# Patient Record
Sex: FEMALE | Race: WHITE | NOT HISPANIC OR LATINO | Employment: FULL TIME | URBAN - METROPOLITAN AREA
[De-identification: names, ages, dates, MRNs, and addresses within clinical notes are randomized per-mention and may not be internally consistent; named-entity substitution may affect disease eponyms.]

---

## 2017-07-08 ENCOUNTER — APPOINTMENT (OUTPATIENT)
Dept: GENERAL RADIOLOGY | Facility: HOSPITAL | Age: 62
End: 2017-07-08

## 2017-07-08 ENCOUNTER — HOSPITAL ENCOUNTER (EMERGENCY)
Facility: HOSPITAL | Age: 62
Discharge: HOME | End: 2017-07-09
Attending: EMERGENCY MEDICINE | Admitting: EMERGENCY MEDICINE

## 2017-07-08 DIAGNOSIS — S93.401A SPRAIN OF RIGHT ANKLE, UNSPECIFIED LIGAMENT, INITIAL ENCOUNTER: ICD-10-CM

## 2017-07-08 DIAGNOSIS — S82.64XA CLOSED NONDISPLACED FRACTURE OF LATERAL MALLEOLUS OF RIGHT FIBULA, INITIAL ENCOUNTER: Primary | ICD-10-CM

## 2017-07-08 PROCEDURE — 99283 EMERGENCY DEPT VISIT LOW MDM: CPT

## 2017-07-08 PROCEDURE — 73610 X-RAY EXAM OF ANKLE: CPT

## 2017-07-09 VITALS
SYSTOLIC BLOOD PRESSURE: 149 MMHG | DIASTOLIC BLOOD PRESSURE: 78 MMHG | HEIGHT: 67 IN | HEART RATE: 79 BPM | WEIGHT: 235 LBS | RESPIRATION RATE: 16 BRPM | TEMPERATURE: 97.8 F | OXYGEN SATURATION: 96 % | BODY MASS INDEX: 36.88 KG/M2

## 2017-07-09 RX ORDER — HYDROCODONE BITARTRATE AND ACETAMINOPHEN 7.5; 325 MG/1; MG/1
1 TABLET ORAL ONCE
Status: COMPLETED | OUTPATIENT
Start: 2017-07-09 | End: 2017-07-09

## 2017-07-09 RX ORDER — HYDROCODONE BITARTRATE AND ACETAMINOPHEN 5; 325 MG/1; MG/1
1 TABLET ORAL EVERY 6 HOURS PRN
Qty: 12 TABLET | Refills: 0 | Status: SHIPPED | OUTPATIENT
Start: 2017-07-09 | End: 2017-12-22

## 2017-07-09 RX ADMIN — HYDROCODONE BITARTRATE AND ACETAMINOPHEN 1 TABLET: 7.5; 325 TABLET ORAL at 02:16

## 2017-07-09 NOTE — ED PROVIDER NOTES
Pt presents to the ED c/o right ankle pain that began tonight after falling down a step. She has no other complaints at this time. On exam, swelling and tenderness to the RLE, skin is intact. Knee is non tender. XR shows a fibula fx. I agree with the plan for ortho boot, d/c, and ortho f/u.     Attestation:  I supervised care provided by the midlevel provider.    We have discussed this patient's history, physical exam, and treatment plan.    I have reviewed the note and personally saw and examined the patient and agree with the plan of care.  I agree with the midlevel provider's findings and plan. I reviewed the midlevel providers note.     Documentation assistance provided by tanisha Carrasco for Dr Acuna Information recorded by the tanisha was done at my direction and has been verified and validated by me.     Didi Carrasco  07/09/17 0143       Robbi Acuna MD  07/09/17 0146

## 2017-07-09 NOTE — DISCHARGE INSTRUCTIONS
PLEASE READ AND REVIEW ALL DISCHARGE INSTRUCTIONS.     Please follow up with your primary care physician for any further evaluation/treatment and further management of your blood pressure.     Recheck in emergency department for any worsening and/or concerning symptoms.     Take all prescribed medicine as written and continue chronic medication.    Wear boot for support.  Ice, Rest, and Elevate as often as possible.  Take pain medicine as needed.      DO NOT DRIVE WHILE ON PAIN MEDICINE.

## 2017-07-09 NOTE — ED NOTES
Pt was walking down some stairs and missed the last step and fell and hurt her right ankle/felipe     Joshua Doan RN  07/08/17 5494

## 2017-07-09 NOTE — ED PROVIDER NOTES
"EMERGENCY DEPARTMENT ENCOUNTER    CHIEF COMPLAINT  Chief Complaint: R ankle pain  History given by: Pt  History limited by: Nothing  Room Number: 07/07  PMD: Evelyn Gordon MD      HPI:  Pt is a 62 y.o. female who presents with R ankle pain onset earlier today. Pt states that she fell down two steps because she missed the last step. She denies any other injury.  Denies head trauma, neck pain, or back pain.      Pt has no pertinent medical hx.    Duration: One day  Timing: gradual  Location: generalized  Radiation: none  Quality: \"pain\"  Intensity/Severity: moderate  Progression: unchanged  Associated Symptoms: none  Aggravating Factors: none  Alleviating Factors: none  Previous Episodes: none  Treatment before arrival: Pt received no treatment PTA..    MEDICAL RECORD REVIEW    Pt has no pertinent medical hx.    PAST MEDICAL HISTORY  Active Ambulatory Problems     Diagnosis Date Noted   • No Active Ambulatory Problems     Resolved Ambulatory Problems     Diagnosis Date Noted   • No Resolved Ambulatory Problems     Past Medical History:   Diagnosis Date   • Hyperlipidemia    • Hypertension        PAST SURGICAL HISTORY  No past surgical history on file.    FAMILY HISTORY  No family history on file.    SOCIAL HISTORY  Social History     Social History   • Marital status:      Spouse name: N/A   • Number of children: N/A   • Years of education: N/A     Occupational History   • Not on file.     Social History Main Topics   • Smoking status: Never Smoker   • Smokeless tobacco: Not on file   • Alcohol use No   • Drug use: No   • Sexual activity: Defer     Other Topics Concern   • Not on file     Social History Narrative       ALLERGIES  Benicar [olmesartan]; Fiorinal [butalbital-aspirin-caffeine]; and Propoxyphene-acetaminophen    REVIEW OF SYSTEMS  Review of Systems   Constitutional: Negative for fever.   HENT: Negative for sore throat.    Eyes: Negative.    Respiratory: Negative for cough and shortness of breath.  "   Cardiovascular: Negative for chest pain.   Gastrointestinal: Negative for abdominal pain, diarrhea and vomiting.   Genitourinary: Negative for dysuria.   Musculoskeletal: Negative for neck pain.        Pt complains of R ankle pain.   Skin: Negative for rash.   Allergic/Immunologic: Negative.    Neurological: Negative for weakness, numbness and headaches.   Hematological: Negative.    Psychiatric/Behavioral: Negative.    All other systems reviewed and are negative.      PHYSICAL EXAM  ED Triage Vitals   Temp Heart Rate Resp BP SpO2   07/08/17 2318 07/08/17 2318 07/08/17 2318 -- 07/08/17 2318   97.8 °F (36.6 °C) 82 18  98 %      Temp src Heart Rate Source Patient Position BP Location FiO2 (%)   07/08/17 2318 07/08/17 2318 -- -- --   Tympanic Monitor          Physical Exam   Constitutional: She is oriented to person, place, and time and well-developed, well-nourished, and in no distress. No distress.   HENT:   Head: Normocephalic and atraumatic.   Eyes: EOM are normal.   Neck: Normal range of motion.   Pulmonary/Chest: Effort normal. No respiratory distress.   Musculoskeletal:   Pt has swelling at her medial and lateral malleolus. She has tenderness at her distal tibia and fibula. There is no proximal tibia tenderness. He has a normal foot exam.   Neurological: She is alert and oriented to person, place, and time.   Skin: Skin is warm and dry. No pallor.   Psychiatric: Mood, memory, affect and judgment normal.   Nursing note and vitals reviewed.      LAB RESULTS  No results found for this or any previous visit (from the past 24 hour(s)).    I ordered the above labs and reviewed the results    RADIOLOGY  XR Ankle 3+ View Right   Final Result   1. Oblique fracture of the distal fibula.       This report was finalized on 7/8/2017 11:50 PM by Giancarlo Gandhi MD.              I ordered the above noted radiological studies and reviewed the images on the PACS system.      PROCEDURES      COURSE & MEDICAL DECISION  "MAKING  Pertinent Imaging studies that were ordered and reviewed are noted above.  Results were reviewed/discussed with the patient and they were also made aware of online assess.  Pt also made aware that some labs, such as cultures, will not be resulted during ER visit and follow up with PMD is necessary.       PROGRESS AND CONSULTS    Progress Notes:    0150 Reviewed pt's history and workup with Dr. Acuna.  After a bedside evaluation; Dr. Acuna agrees with the plan of care    0209 The patient's history, physical exam, and image findings were discussed with the physician, who also performed a face to face history and physical exam.  I discussed all results and noted any abnormalities with patient.  Discussed absoute need to recheck abnormalities with their family physician.  I answered any of the patient's questions.  Discussed plan for discharge, as there is no emergent indication for admission.  Pt is agreeable and understands need for follow up and repeat testing.  Pt is aware that discharge does not mean that nothing is wrong but it indicates no emergency is present and they must continue care with their family physician.  Pt is discharged with instructions to follow up with primary care doctor to have their blood pressure rechecked.       MEDICATIONS GIVEN IN ER  Medications   HYDROcodone-acetaminophen (NORCO) 7.5-325 MG per tablet 1 tablet (not administered)       Pulse 82  Temp 97.8 °F (36.6 °C) (Tympanic)   Resp 18  Ht 67\" (170.2 cm)  Wt 235 lb (107 kg)  SpO2 98%  BMI 36.81 kg/m2      DIAGNOSIS  Final diagnoses:   Closed nondisplaced fracture of lateral malleolus of right fibula, initial encounter   Sprain of right ankle, unspecified ligament, initial encounter       FOLLOW UP   Evelyn Gordon MD  [address] [facility] [address] [phone]          Akin Peterson MD  [address] [facility] [address]  [facility] [id number] [phone]            RX     Medication List      New Prescriptions       "    HYDROcodone-acetaminophen 5-325 MG per tablet   Commonly known as:  NORCO   Take 1 tablet by mouth Every 6 (Six) Hours As Needed for Moderate Pain   (4-6).          Ayan report [id number] reviewed.  Risks, benefits, alternatives discussed with patient.  Pt consents to treatment and agrees to follow up with PMD tomorrow for further care and any other prescriptions.     I personally scribed for Eloise Chaidez PA-C on 7/8/2017 at 2:11 AM.  Electronically signed by Dillon Weaver on 7/8/2017 at time 2:11 AM       Dillon Weaver  07/09/17 0211       Eloise Chaidez PA-C  07/09/17 0218

## 2017-12-05 ENCOUNTER — TRANSCRIBE ORDERS (OUTPATIENT)
Dept: ADMINISTRATIVE | Facility: HOSPITAL | Age: 62
End: 2017-12-05

## 2017-12-05 ENCOUNTER — LAB (OUTPATIENT)
Dept: LAB | Facility: HOSPITAL | Age: 62
End: 2017-12-05
Attending: OPHTHALMOLOGY

## 2017-12-05 DIAGNOSIS — H02.531 UPPER EYELID RETRACTION OF RIGHT EYE: Primary | ICD-10-CM

## 2017-12-05 DIAGNOSIS — H02.531 UPPER EYELID RETRACTION OF RIGHT EYE: ICD-10-CM

## 2017-12-05 LAB
T-UPTAKE NFR SERPL: 1.17 TBI (ref 0.8–1.3)
T4 SERPL-MCNC: 8.56 MCG/DL (ref 4.5–11.7)
TSH SERPL DL<=0.05 MIU/L-ACNC: 2.24 MIU/ML (ref 0.27–4.2)

## 2017-12-05 PROCEDURE — 36415 COLL VENOUS BLD VENIPUNCTURE: CPT

## 2017-12-05 PROCEDURE — 84479 ASSAY OF THYROID (T3 OR T4): CPT

## 2017-12-05 PROCEDURE — 84443 ASSAY THYROID STIM HORMONE: CPT

## 2017-12-05 PROCEDURE — 84436 ASSAY OF TOTAL THYROXINE: CPT

## 2017-12-22 ENCOUNTER — OFFICE VISIT (OUTPATIENT)
Dept: INTERNAL MEDICINE | Facility: CLINIC | Age: 62
End: 2017-12-22

## 2017-12-22 VITALS
WEIGHT: 240 LBS | OXYGEN SATURATION: 99 % | BODY MASS INDEX: 37.67 KG/M2 | TEMPERATURE: 98.2 F | SYSTOLIC BLOOD PRESSURE: 132 MMHG | DIASTOLIC BLOOD PRESSURE: 86 MMHG | HEIGHT: 67 IN | HEART RATE: 95 BPM

## 2017-12-22 DIAGNOSIS — I10 BENIGN ESSENTIAL HYPERTENSION: ICD-10-CM

## 2017-12-22 DIAGNOSIS — E78.2 MIXED HYPERLIPIDEMIA: ICD-10-CM

## 2017-12-22 DIAGNOSIS — H02.401 PTOSIS OF RIGHT EYELID: Primary | ICD-10-CM

## 2017-12-22 DIAGNOSIS — R73.01 IMPAIRED FASTING GLUCOSE: ICD-10-CM

## 2017-12-22 PROBLEM — E66.9 ADIPOSITY: Status: ACTIVE | Noted: 2017-12-22

## 2017-12-22 PROBLEM — E78.5 HYPERLIPIDEMIA: Status: ACTIVE | Noted: 2017-12-22

## 2017-12-22 LAB
ALBUMIN SERPL-MCNC: 4 G/DL (ref 3.5–5.2)
ALBUMIN/GLOB SERPL: 1.1 G/DL
ALP SERPL-CCNC: 156 U/L (ref 39–117)
ALT SERPL-CCNC: 33 U/L (ref 1–33)
APPEARANCE UR: CLEAR
AST SERPL-CCNC: 35 U/L (ref 1–32)
BASOPHILS # BLD AUTO: 0.01 10*3/MM3 (ref 0–0.2)
BASOPHILS NFR BLD AUTO: 0.2 % (ref 0–1.5)
BILIRUB SERPL-MCNC: 0.3 MG/DL (ref 0.1–1.2)
BILIRUB UR QL STRIP: NEGATIVE
BUN SERPL-MCNC: 19 MG/DL (ref 8–23)
BUN/CREAT SERPL: 21.3 (ref 7–25)
CALCIUM SERPL-MCNC: 9.1 MG/DL (ref 8.6–10.5)
CHLORIDE SERPL-SCNC: 104 MMOL/L (ref 98–107)
CHOLEST SERPL-MCNC: 207 MG/DL (ref 0–200)
CHOLEST/HDLC SERPL: 5.31 {RATIO}
CO2 SERPL-SCNC: 28.4 MMOL/L (ref 22–29)
COLOR UR: YELLOW
CREAT SERPL-MCNC: 0.89 MG/DL (ref 0.57–1)
EOSINOPHIL # BLD AUTO: 0.15 10*3/MM3 (ref 0–0.7)
EOSINOPHIL NFR BLD AUTO: 3 % (ref 0.3–6.2)
ERYTHROCYTE [DISTWIDTH] IN BLOOD BY AUTOMATED COUNT: 14.5 % (ref 11.7–13)
GLOBULIN SER CALC-MCNC: 3.7 GM/DL
GLUCOSE SERPL-MCNC: 96 MG/DL (ref 65–99)
GLUCOSE UR QL: NEGATIVE
HBA1C MFR BLD: 5.8 % (ref 4.8–5.6)
HCT VFR BLD AUTO: 47 % (ref 35.6–45.5)
HDLC SERPL-MCNC: 39 MG/DL (ref 40–60)
HGB BLD-MCNC: 14.6 G/DL (ref 11.9–15.5)
HGB UR QL STRIP: NEGATIVE
IMM GRANULOCYTES # BLD: 0 10*3/MM3 (ref 0–0.03)
IMM GRANULOCYTES NFR BLD: 0 % (ref 0–0.5)
KETONES UR QL STRIP: NEGATIVE
LDLC SERPL CALC-MCNC: 146 MG/DL (ref 0–100)
LEUKOCYTE ESTERASE UR QL STRIP: NEGATIVE
LYMPHOCYTES # BLD AUTO: 1.41 10*3/MM3 (ref 0.9–4.8)
LYMPHOCYTES NFR BLD AUTO: 28.1 % (ref 19.6–45.3)
MCH RBC QN AUTO: 28.6 PG (ref 26.9–32)
MCHC RBC AUTO-ENTMCNC: 31.1 G/DL (ref 32.4–36.3)
MCV RBC AUTO: 92 FL (ref 80.5–98.2)
MONOCYTES # BLD AUTO: 0.94 10*3/MM3 (ref 0.2–1.2)
MONOCYTES NFR BLD AUTO: 18.7 % (ref 5–12)
NEUTROPHILS # BLD AUTO: 2.51 10*3/MM3 (ref 1.9–8.1)
NEUTROPHILS NFR BLD AUTO: 50 % (ref 42.7–76)
NITRITE UR QL STRIP: NEGATIVE
PH UR STRIP: 6 [PH] (ref 5–8)
PLATELET # BLD AUTO: 151 10*3/MM3 (ref 140–500)
POTASSIUM SERPL-SCNC: 4.7 MMOL/L (ref 3.5–5.2)
PROT SERPL-MCNC: 7.7 G/DL (ref 6–8.5)
PROT UR QL STRIP: NEGATIVE
RBC # BLD AUTO: 5.11 10*6/MM3 (ref 3.9–5.2)
SODIUM SERPL-SCNC: 144 MMOL/L (ref 136–145)
SP GR UR: 1.01 (ref 1–1.03)
TRIGL SERPL-MCNC: 112 MG/DL (ref 0–150)
UROBILINOGEN UR STRIP-MCNC: NORMAL MG/DL
VLDLC SERPL CALC-MCNC: 22.4 MG/DL (ref 5–40)
WBC # BLD AUTO: 5.02 10*3/MM3 (ref 4.5–10.7)

## 2017-12-22 PROCEDURE — 99214 OFFICE O/P EST MOD 30 MIN: CPT | Performed by: FAMILY MEDICINE

## 2017-12-22 RX ORDER — LISINOPRIL 40 MG/1
40 TABLET ORAL DAILY
Qty: 30 TABLET | Refills: 5 | Status: SHIPPED | OUTPATIENT
Start: 2017-12-22 | End: 2018-07-02 | Stop reason: SDUPTHER

## 2017-12-22 RX ORDER — FOLIC ACID 1 MG/1
800 TABLET ORAL
COMMUNITY
End: 2018-07-02 | Stop reason: SDUPTHER

## 2017-12-22 RX ORDER — IBUPROFEN 200 MG
200 TABLET ORAL EVERY 6 HOURS PRN
COMMUNITY

## 2017-12-22 RX ORDER — UBIDECARENONE 75 MG
1000 CAPSULE ORAL
COMMUNITY

## 2017-12-22 NOTE — PROGRESS NOTES
Subjective   Lacey Ramirez is a 62 y.o. female.     Chief Complaint   Patient presents with   • Hypertension   • Hyperlipidemia         History of Present Illness   Patient seen here with the history of hypertension and has had drooping right eyelid since rubbing the eyelid in August.  She has been evaluated by ophthalmology Dr. Elizabeth.  She's had negative thyroid tests.  She has not had an MRI of the brain but has had no other symptoms.  Discussed seeing neurology for another opinion.  This appears to be a local drooping of the eyelid but not completely drooping.  She's had no diplopia.    Otherwise treatment of hypertension history of hyperlipidemia is reviewed.      The following portions of the patient's history were reviewed and updated as appropriate: allergies, current medications, past social history and problem list.    Review of Systems   Constitutional: Negative.    HENT: Negative.    Eyes: Negative.    Respiratory: Negative.    Cardiovascular: Negative.    Gastrointestinal: Negative.    Endocrine: Negative.    Genitourinary: Negative.    Musculoskeletal: Negative.    Skin: Negative.    Allergic/Immunologic: Negative.    Neurological: Negative.    Hematological: Negative.    Psychiatric/Behavioral: Negative.        Objective   Vitals:    12/22/17 0930   BP: 132/86   Pulse: 95   Temp: 98.2 °F (36.8 °C)   SpO2: 99%     Physical Exam   Constitutional: She is oriented to person, place, and time. She appears well-developed and well-nourished.   HENT:   Head: Normocephalic and atraumatic.   Right Ear: Tympanic membrane and external ear normal.   Left Ear: Tympanic membrane and external ear normal.   Nose: Nose normal.   Mouth/Throat: Oropharynx is clear and moist.   Eyes: Conjunctivae and EOM are normal. Pupils are equal, round, and reactive to light.       Neck: Normal range of motion. Neck supple. No JVD present. No thyromegaly present.   Cardiovascular: Normal rate, regular rhythm, normal heart sounds and  intact distal pulses.    Pulmonary/Chest: Effort normal and breath sounds normal.   Abdominal: Soft. Bowel sounds are normal.   Musculoskeletal: Normal range of motion.   Lymphadenopathy:     She has no cervical adenopathy.   Neurological: She is alert and oriented to person, place, and time. No cranial nerve deficit. Coordination normal.   Skin: Skin is warm and dry. No rash noted.   Psychiatric: She has a normal mood and affect. Her behavior is normal. Judgment and thought content normal.   Vitals reviewed.      Assessment/Plan   Problem List Items Addressed This Visit        Cardiovascular and Mediastinum    Benign essential hypertension    Relevant Medications    lisinopril (PRINIVIL,ZESTRIL) 40 MG tablet    Other Relevant Orders    Comprehensive Metabolic Panel    CBC & Differential    Lipid Panel With / Chol / HDL Ratio    Hemoglobin A1c    Urinalysis With / Microscopic If Indicated - Urine, Clean Catch    Hyperlipidemia    Relevant Orders    Comprehensive Metabolic Panel    CBC & Differential    Lipid Panel With / Chol / HDL Ratio    Hemoglobin A1c    Urinalysis With / Microscopic If Indicated - Urine, Clean Catch       Endocrine    Impaired fasting glucose    Relevant Orders    Comprehensive Metabolic Panel    CBC & Differential    Lipid Panel With / Chol / HDL Ratio    Hemoglobin A1c    Urinalysis With / Microscopic If Indicated - Urine, Clean Catch      Other Visit Diagnoses     Ptosis of right eyelid    -  Primary    Relevant Orders    Ambulatory Referral to Neurology    Comprehensive Metabolic Panel    CBC & Differential    Lipid Panel With / Chol / HDL Ratio    Hemoglobin A1c    Urinalysis With / Microscopic If Indicated - Urine, Clean Catch      Plan: Screening labs referral to neurology.  Meds refilled recheck in 6 months sooner if needed.

## 2018-05-14 DIAGNOSIS — E78.2 MIXED HYPERLIPIDEMIA: Primary | ICD-10-CM

## 2018-05-14 DIAGNOSIS — I10 ESSENTIAL HYPERTENSION: ICD-10-CM

## 2018-05-14 DIAGNOSIS — R73.02 GLUCOSE INTOLERANCE (IMPAIRED GLUCOSE TOLERANCE): ICD-10-CM

## 2018-07-01 ENCOUNTER — RESULTS ENCOUNTER (OUTPATIENT)
Dept: INTERNAL MEDICINE | Facility: CLINIC | Age: 63
End: 2018-07-01

## 2018-07-01 DIAGNOSIS — R73.02 GLUCOSE INTOLERANCE (IMPAIRED GLUCOSE TOLERANCE): ICD-10-CM

## 2018-07-01 DIAGNOSIS — I10 ESSENTIAL HYPERTENSION: ICD-10-CM

## 2018-07-01 DIAGNOSIS — E78.2 MIXED HYPERLIPIDEMIA: ICD-10-CM

## 2018-07-02 ENCOUNTER — OFFICE VISIT (OUTPATIENT)
Dept: INTERNAL MEDICINE | Facility: CLINIC | Age: 63
End: 2018-07-02

## 2018-07-02 VITALS
DIASTOLIC BLOOD PRESSURE: 88 MMHG | SYSTOLIC BLOOD PRESSURE: 142 MMHG | HEART RATE: 89 BPM | WEIGHT: 248.8 LBS | BODY MASS INDEX: 39.05 KG/M2 | RESPIRATION RATE: 16 BRPM | TEMPERATURE: 99.1 F | HEIGHT: 67 IN | OXYGEN SATURATION: 97 %

## 2018-07-02 DIAGNOSIS — I10 BENIGN ESSENTIAL HYPERTENSION: ICD-10-CM

## 2018-07-02 DIAGNOSIS — R73.01 IMPAIRED FASTING GLUCOSE: ICD-10-CM

## 2018-07-02 DIAGNOSIS — E78.2 MIXED HYPERLIPIDEMIA: Primary | ICD-10-CM

## 2018-07-02 LAB
ALBUMIN SERPL-MCNC: 4 G/DL (ref 3.5–5.2)
ALBUMIN/GLOB SERPL: 1.2 G/DL
ALP SERPL-CCNC: 133 U/L (ref 39–117)
ALT SERPL-CCNC: 23 U/L (ref 1–33)
AST SERPL-CCNC: 19 U/L (ref 1–32)
BASOPHILS # BLD AUTO: 0.01 10*3/MM3 (ref 0–0.2)
BASOPHILS NFR BLD AUTO: 0.1 % (ref 0–1.5)
BILIRUB SERPL-MCNC: 0.4 MG/DL (ref 0.1–1.2)
BUN SERPL-MCNC: 20 MG/DL (ref 8–23)
BUN/CREAT SERPL: 22 (ref 7–25)
CALCIUM SERPL-MCNC: 9.5 MG/DL (ref 8.6–10.5)
CHLORIDE SERPL-SCNC: 105 MMOL/L (ref 98–107)
CHOLEST SERPL-MCNC: 209 MG/DL (ref 0–200)
CHOLEST/HDLC SERPL: 4.75 {RATIO}
CO2 SERPL-SCNC: 24.2 MMOL/L (ref 22–29)
CREAT SERPL-MCNC: 0.91 MG/DL (ref 0.57–1)
EOSINOPHIL # BLD AUTO: 0.2 10*3/MM3 (ref 0–0.7)
EOSINOPHIL NFR BLD AUTO: 2.9 % (ref 0.3–6.2)
ERYTHROCYTE [DISTWIDTH] IN BLOOD BY AUTOMATED COUNT: 13.9 % (ref 11.7–13)
GLOBULIN SER CALC-MCNC: 3.3 GM/DL
GLUCOSE SERPL-MCNC: 116 MG/DL (ref 65–99)
HBA1C MFR BLD: 5.9 % (ref 4.8–5.6)
HCT VFR BLD AUTO: 45.8 % (ref 35.6–45.5)
HDLC SERPL-MCNC: 44 MG/DL (ref 40–60)
HGB BLD-MCNC: 14.4 G/DL (ref 11.9–15.5)
IMM GRANULOCYTES # BLD: 0.02 10*3/MM3 (ref 0–0.03)
IMM GRANULOCYTES NFR BLD: 0.3 % (ref 0–0.5)
LDLC SERPL CALC-MCNC: 140 MG/DL (ref 0–100)
LYMPHOCYTES # BLD AUTO: 1.68 10*3/MM3 (ref 0.9–4.8)
LYMPHOCYTES NFR BLD AUTO: 24.1 % (ref 19.6–45.3)
MCH RBC QN AUTO: 28.9 PG (ref 26.9–32)
MCHC RBC AUTO-ENTMCNC: 31.4 G/DL (ref 32.4–36.3)
MCV RBC AUTO: 92 FL (ref 80.5–98.2)
MONOCYTES # BLD AUTO: 0.55 10*3/MM3 (ref 0.2–1.2)
MONOCYTES NFR BLD AUTO: 7.9 % (ref 5–12)
NEUTROPHILS # BLD AUTO: 4.51 10*3/MM3 (ref 1.9–8.1)
NEUTROPHILS NFR BLD AUTO: 64.7 % (ref 42.7–76)
PLATELET # BLD AUTO: 181 10*3/MM3 (ref 140–500)
POTASSIUM SERPL-SCNC: 4.3 MMOL/L (ref 3.5–5.2)
PROT SERPL-MCNC: 7.3 G/DL (ref 6–8.5)
RBC # BLD AUTO: 4.98 10*6/MM3 (ref 3.9–5.2)
SODIUM SERPL-SCNC: 141 MMOL/L (ref 136–145)
TRIGL SERPL-MCNC: 125 MG/DL (ref 0–150)
TSH SERPL DL<=0.005 MIU/L-ACNC: 1.47 MIU/ML (ref 0.27–4.2)
VLDLC SERPL CALC-MCNC: 25 MG/DL (ref 5–40)
WBC # BLD AUTO: 6.97 10*3/MM3 (ref 4.5–10.7)

## 2018-07-02 PROCEDURE — 99213 OFFICE O/P EST LOW 20 MIN: CPT | Performed by: FAMILY MEDICINE

## 2018-07-02 RX ORDER — LISINOPRIL 40 MG/1
40 TABLET ORAL DAILY
Qty: 30 TABLET | Refills: 5 | Status: SHIPPED | OUTPATIENT
Start: 2018-07-02 | End: 2019-02-10 | Stop reason: SDUPTHER

## 2018-07-02 RX ORDER — MULTIVIT WITH MINERALS/LUTEIN
1000 TABLET ORAL DAILY
Qty: 30 CAPSULE | Refills: 6 | Status: SHIPPED | OUTPATIENT
Start: 2018-07-02

## 2018-07-02 RX ORDER — UREA 10 %
800 LOTION (ML) TOPICAL DAILY
Qty: 30 TABLET | Refills: 6 | Status: SHIPPED | OUTPATIENT
Start: 2018-07-02

## 2018-07-02 NOTE — PROGRESS NOTES
Subjective   Lacey Ramirez is a 63 y.o. female.     Chief Complaint   Patient presents with   • Hypertension   • Hyperlipidemia   Elevated glucose      History of Present Illness   Patient symptomatically is doing well.  She is still working.  She is tolerating her medications without difficulty.    History of back near for physical.  Labs be done today and preceding next visit.  Reviewed treatment of hypertension hyperlipidemia also consideration elevated glucose.  She is encouraged to get another gynecologist get mammograms.      The following portions of the patient's history were reviewed and updated as appropriate: allergies, current medications, past social history and problem list.    Review of Systems   Constitutional: Negative.    HENT: Negative.    Eyes: Negative.    Respiratory: Negative.    Cardiovascular: Negative.    Gastrointestinal: Negative.    All other systems reviewed and are negative.      Objective   Vitals:    07/02/18 0827   BP: 142/88   Pulse: 89   Resp: 16   Temp: 99.1 °F (37.3 °C)   SpO2: 97%     Physical Exam   Constitutional: She is oriented to person, place, and time. She appears well-developed.   HENT:   Head: Normocephalic.   Right Ear: External ear normal.   Left Ear: External ear normal.   Mouth/Throat: Oropharynx is clear and moist.   Eyes: Pupils are equal, round, and reactive to light.   Neck: Normal range of motion. Neck supple.   Cardiovascular: Normal rate, regular rhythm and normal heart sounds.    Pulmonary/Chest: Effort normal and breath sounds normal.   Abdominal: Soft. Bowel sounds are normal.   Musculoskeletal: Normal range of motion.   Neurological: She is alert and oriented to person, place, and time.   Skin: Skin is warm and dry.   Psychiatric: She has a normal mood and affect.   Vitals reviewed.      Assessment/Plan   Problem List Items Addressed This Visit        Cardiovascular and Mediastinum    Benign essential hypertension    Relevant Orders    CBC &  Differential    Comprehensive Metabolic Panel    Hemoglobin A1c    Lipid Panel With / Chol / HDL Ratio    TSH    T4, Free    T3, Free    Urinalysis With Culture If Indicated - Urine, Clean Catch    CBC & Differential    Comprehensive Metabolic Panel    Hemoglobin A1c    Lipid Panel With / Chol / HDL Ratio    TSH    Urinalysis With Microscopic If Indicated (No Culture) - Urine, Clean Catch    Hyperlipidemia - Primary    Relevant Orders    CBC & Differential    Comprehensive Metabolic Panel    Hemoglobin A1c    Lipid Panel With / Chol / HDL Ratio    TSH    T4, Free    T3, Free    Urinalysis With Culture If Indicated - Urine, Clean Catch    CBC & Differential    Comprehensive Metabolic Panel    Hemoglobin A1c    Lipid Panel With / Chol / HDL Ratio    TSH    Urinalysis With Microscopic If Indicated (No Culture) - Urine, Clean Catch       Endocrine    Impaired fasting glucose    Relevant Orders    CBC & Differential    Comprehensive Metabolic Panel    Hemoglobin A1c    Lipid Panel With / Chol / HDL Ratio    TSH    T4, Free    T3, Free    Urinalysis With Culture If Indicated - Urine, Clean Catch    CBC & Differential    Comprehensive Metabolic Panel    Hemoglobin A1c    Lipid Panel With / Chol / HDL Ratio    TSH    Urinalysis With Microscopic If Indicated (No Culture) - Urine, Clean Catch      Plan: Screening labs today and in a year.  Encouraged to get gynecologist and mammograms.  Medications continued.  Labs today

## 2019-02-11 RX ORDER — LISINOPRIL 40 MG/1
TABLET ORAL
Qty: 30 TABLET | Refills: 4 | Status: SHIPPED | OUTPATIENT
Start: 2019-02-11 | End: 2019-07-28 | Stop reason: SDUPTHER

## 2019-06-01 ENCOUNTER — RESULTS ENCOUNTER (OUTPATIENT)
Dept: INTERNAL MEDICINE | Facility: CLINIC | Age: 64
End: 2019-06-01

## 2019-06-01 DIAGNOSIS — I10 BENIGN ESSENTIAL HYPERTENSION: ICD-10-CM

## 2019-06-01 DIAGNOSIS — R73.01 IMPAIRED FASTING GLUCOSE: ICD-10-CM

## 2019-06-01 DIAGNOSIS — E78.2 MIXED HYPERLIPIDEMIA: ICD-10-CM

## 2019-07-29 RX ORDER — LISINOPRIL 40 MG/1
TABLET ORAL
Qty: 30 TABLET | Refills: 2 | Status: SHIPPED | OUTPATIENT
Start: 2019-07-29

## 2019-09-05 ENCOUNTER — TELEPHONE (OUTPATIENT)
Dept: INTERNAL MEDICINE | Facility: CLINIC | Age: 64
End: 2019-09-05

## 2019-09-05 NOTE — TELEPHONE ENCOUNTER
Pt. Didn't mind waiting for Dr. Tavera and understands that he is ill but she wants us to continue to refill meds when it has been over a year and I explained that she would need to be seen by one of our NP's to continue to get refills after this 30 day refill. She did not agree and said that she was going to find another physician.

## 2025-01-01 DIAGNOSIS — K21.9 GASTROESOPHAGEAL REFLUX DISEASE, UNSPECIFIED WHETHER ESOPHAGITIS PRESENT: Chronic | ICD-10-CM

## 2025-01-01 RX ORDER — PANTOPRAZOLE SODIUM 40 MG/1
40 TABLET, DELAYED RELEASE ORAL
Qty: 30 TABLET | Refills: 0 | Status: SHIPPED | OUTPATIENT
Start: 2025-01-01

## 2025-02-23 ENCOUNTER — HOSPITAL ENCOUNTER (INPATIENT)
Facility: HOSPITAL | Age: 70
LOS: 1 days | Discharge: HOME | End: 2025-02-27
Attending: EMERGENCY MEDICINE | Admitting: INTERNAL MEDICINE
Payer: MEDICARE

## 2025-02-23 ENCOUNTER — APPOINTMENT (OUTPATIENT)
Dept: GENERAL RADIOLOGY | Facility: HOSPITAL | Age: 70
End: 2025-02-23
Payer: MEDICARE

## 2025-02-23 DIAGNOSIS — M79.89 LEG SWELLING: ICD-10-CM

## 2025-02-23 DIAGNOSIS — K74.60 CIRRHOSIS OF LIVER WITH ASCITES, UNSPECIFIED HEPATIC CIRRHOSIS TYPE: ICD-10-CM

## 2025-02-23 DIAGNOSIS — R18.8 OTHER ASCITES: ICD-10-CM

## 2025-02-23 DIAGNOSIS — R18.8 CIRRHOSIS OF LIVER WITH ASCITES, UNSPECIFIED HEPATIC CIRRHOSIS TYPE: ICD-10-CM

## 2025-02-23 DIAGNOSIS — R07.9 CHEST PAIN, UNSPECIFIED TYPE: Primary | ICD-10-CM

## 2025-02-23 LAB
ALBUMIN SERPL-MCNC: 2.2 G/DL (ref 3.5–5.2)
ALBUMIN/GLOB SERPL: 0.5 G/DL
ALP SERPL-CCNC: 269 U/L (ref 39–117)
ALT SERPL W P-5'-P-CCNC: 47 U/L (ref 1–33)
ANION GAP SERPL CALCULATED.3IONS-SCNC: 6 MMOL/L (ref 5–15)
APTT PPP: 35.9 SECONDS (ref 22.7–35.4)
AST SERPL-CCNC: 83 U/L (ref 1–32)
BASOPHILS # BLD AUTO: 0.05 10*3/MM3 (ref 0–0.2)
BASOPHILS NFR BLD AUTO: 0.3 % (ref 0–1.5)
BILIRUB SERPL-MCNC: 7 MG/DL (ref 0–1.2)
BUN SERPL-MCNC: 15 MG/DL (ref 8–23)
BUN/CREAT SERPL: 13.8 (ref 7–25)
CALCIUM SPEC-SCNC: 8 MG/DL (ref 8.6–10.5)
CHLORIDE SERPL-SCNC: 100 MMOL/L (ref 98–107)
CO2 SERPL-SCNC: 29 MMOL/L (ref 22–29)
CREAT SERPL-MCNC: 1.09 MG/DL (ref 0.57–1)
DEPRECATED RDW RBC AUTO: 51.3 FL (ref 37–54)
EGFRCR SERPLBLD CKD-EPI 2021: 55.4 ML/MIN/1.73
EOSINOPHIL # BLD AUTO: 0.04 10*3/MM3 (ref 0–0.4)
EOSINOPHIL NFR BLD AUTO: 0.3 % (ref 0.3–6.2)
ERYTHROCYTE [DISTWIDTH] IN BLOOD BY AUTOMATED COUNT: 14.5 % (ref 12.3–15.4)
GEN 5 1HR TROPONIN T REFLEX: 8 NG/L
GLOBULIN UR ELPH-MCNC: 4.4 GM/DL
GLUCOSE SERPL-MCNC: 104 MG/DL (ref 65–99)
HCT VFR BLD AUTO: 37.3 % (ref 34–46.6)
HGB BLD-MCNC: 13.2 G/DL (ref 12–15.9)
HOLD SPECIMEN: NORMAL
HOLD SPECIMEN: NORMAL
IMM GRANULOCYTES # BLD AUTO: 0.12 10*3/MM3 (ref 0–0.05)
IMM GRANULOCYTES NFR BLD AUTO: 0.8 % (ref 0–0.5)
INR PPP: 1.68 (ref 0.9–1.1)
LYMPHOCYTES # BLD AUTO: 1.31 10*3/MM3 (ref 0.7–3.1)
LYMPHOCYTES NFR BLD AUTO: 8.4 % (ref 19.6–45.3)
MCH RBC QN AUTO: 34.6 PG (ref 26.6–33)
MCHC RBC AUTO-ENTMCNC: 35.4 G/DL (ref 31.5–35.7)
MCV RBC AUTO: 97.6 FL (ref 79–97)
MONOCYTES # BLD AUTO: 1.29 10*3/MM3 (ref 0.1–0.9)
MONOCYTES NFR BLD AUTO: 8.3 % (ref 5–12)
NEUTROPHILS NFR BLD AUTO: 12.73 10*3/MM3 (ref 1.7–7)
NEUTROPHILS NFR BLD AUTO: 81.9 % (ref 42.7–76)
NRBC BLD AUTO-RTO: 0 /100 WBC (ref 0–0.2)
PLATELET # BLD AUTO: 165 10*3/MM3 (ref 140–450)
PMV BLD AUTO: 10.1 FL (ref 6–12)
POTASSIUM SERPL-SCNC: 4.1 MMOL/L (ref 3.5–5.2)
PROCALCITONIN SERPL-MCNC: 0.48 NG/ML (ref 0–0.25)
PROT SERPL-MCNC: 6.6 G/DL (ref 6–8.5)
PROTHROMBIN TIME: 19.9 SECONDS (ref 11.7–14.2)
RBC # BLD AUTO: 3.82 10*6/MM3 (ref 3.77–5.28)
SODIUM SERPL-SCNC: 135 MMOL/L (ref 136–145)
TROPONIN T NUMERIC DELTA: 0 NG/L
TROPONIN T SERPL HS-MCNC: 8 NG/L
WBC NRBC COR # BLD AUTO: 15.54 10*3/MM3 (ref 3.4–10.8)
WHOLE BLOOD HOLD COAG: NORMAL
WHOLE BLOOD HOLD SPECIMEN: NORMAL

## 2025-02-23 PROCEDURE — 93010 ELECTROCARDIOGRAM REPORT: CPT | Performed by: INTERNAL MEDICINE

## 2025-02-23 PROCEDURE — 85730 THROMBOPLASTIN TIME PARTIAL: CPT | Performed by: EMERGENCY MEDICINE

## 2025-02-23 PROCEDURE — 99285 EMERGENCY DEPT VISIT HI MDM: CPT

## 2025-02-23 PROCEDURE — 93005 ELECTROCARDIOGRAM TRACING: CPT | Performed by: EMERGENCY MEDICINE

## 2025-02-23 PROCEDURE — 85025 COMPLETE CBC W/AUTO DIFF WBC: CPT | Performed by: EMERGENCY MEDICINE

## 2025-02-23 PROCEDURE — 84484 ASSAY OF TROPONIN QUANT: CPT | Performed by: EMERGENCY MEDICINE

## 2025-02-23 PROCEDURE — 80053 COMPREHEN METABOLIC PANEL: CPT | Performed by: EMERGENCY MEDICINE

## 2025-02-23 PROCEDURE — 84484 ASSAY OF TROPONIN QUANT: CPT

## 2025-02-23 PROCEDURE — 71045 X-RAY EXAM CHEST 1 VIEW: CPT

## 2025-02-23 PROCEDURE — 93005 ELECTROCARDIOGRAM TRACING: CPT

## 2025-02-23 PROCEDURE — 0202U NFCT DS 22 TRGT SARS-COV-2: CPT | Performed by: EMERGENCY MEDICINE

## 2025-02-23 PROCEDURE — 84145 PROCALCITONIN (PCT): CPT | Performed by: EMERGENCY MEDICINE

## 2025-02-23 PROCEDURE — 85610 PROTHROMBIN TIME: CPT | Performed by: EMERGENCY MEDICINE

## 2025-02-23 PROCEDURE — 36415 COLL VENOUS BLD VENIPUNCTURE: CPT | Performed by: EMERGENCY MEDICINE

## 2025-02-23 RX ORDER — SODIUM CHLORIDE 0.9 % (FLUSH) 0.9 %
10 SYRINGE (ML) INJECTION AS NEEDED
Status: DISCONTINUED | OUTPATIENT
Start: 2025-02-23 | End: 2025-02-27 | Stop reason: HOSPADM

## 2025-02-23 RX ORDER — ASPIRIN 325 MG
325 TABLET ORAL ONCE
Status: DISCONTINUED | OUTPATIENT
Start: 2025-02-23 | End: 2025-02-27 | Stop reason: HOSPADM

## 2025-02-24 ENCOUNTER — APPOINTMENT (OUTPATIENT)
Dept: CT IMAGING | Facility: HOSPITAL | Age: 70
End: 2025-02-24
Payer: MEDICARE

## 2025-02-24 ENCOUNTER — APPOINTMENT (OUTPATIENT)
Dept: CARDIOLOGY | Facility: HOSPITAL | Age: 70
End: 2025-02-24
Payer: MEDICARE

## 2025-02-24 PROBLEM — K74.60 CIRRHOSIS: Status: ACTIVE | Noted: 2025-02-24

## 2025-02-24 PROBLEM — D64.9 ANEMIA: Status: ACTIVE | Noted: 2025-02-24

## 2025-02-24 PROBLEM — D72.829 LEUKOCYTOSIS: Status: ACTIVE | Noted: 2025-02-24

## 2025-02-24 PROBLEM — D69.6 THROMBOCYTOPENIA: Status: ACTIVE | Noted: 2025-02-24

## 2025-02-24 PROBLEM — E87.1 HYPONATREMIA: Status: ACTIVE | Noted: 2025-02-24

## 2025-02-24 PROBLEM — R93.5 ABNORMAL CT OF THE ABDOMEN: Status: ACTIVE | Noted: 2025-02-24

## 2025-02-24 PROBLEM — E87.6 HYPOKALEMIA: Status: ACTIVE | Noted: 2025-02-24

## 2025-02-24 PROBLEM — R73.03 PREDIABETES: Status: ACTIVE | Noted: 2025-02-24

## 2025-02-24 PROBLEM — R74.01 TRANSAMINITIS: Status: ACTIVE | Noted: 2025-02-24

## 2025-02-24 PROBLEM — R07.9 CHEST PAIN: Status: ACTIVE | Noted: 2025-02-24

## 2025-02-24 LAB
ALBUMIN SERPL-MCNC: 1.8 G/DL (ref 3.5–5.2)
ALBUMIN/GLOB SERPL: 0.5 G/DL
ALP SERPL-CCNC: 225 U/L (ref 39–117)
ALT SERPL W P-5'-P-CCNC: 38 U/L (ref 1–33)
ANION GAP SERPL CALCULATED.3IONS-SCNC: 5.5 MMOL/L (ref 5–15)
AST SERPL-CCNC: 70 U/L (ref 1–32)
B PARAPERT DNA SPEC QL NAA+PROBE: NOT DETECTED
B PERT DNA SPEC QL NAA+PROBE: NOT DETECTED
BH CV LOWER VASCULAR LEFT COMMON FEMORAL AUGMENT: NORMAL
BH CV LOWER VASCULAR LEFT COMMON FEMORAL COMPETENT: NORMAL
BH CV LOWER VASCULAR LEFT COMMON FEMORAL COMPRESS: NORMAL
BH CV LOWER VASCULAR LEFT COMMON FEMORAL PHASIC: NORMAL
BH CV LOWER VASCULAR LEFT COMMON FEMORAL SPONT: NORMAL
BH CV LOWER VASCULAR LEFT DISTAL FEMORAL COMPRESS: NORMAL
BH CV LOWER VASCULAR LEFT GASTRONEMIUS COMPRESS: NORMAL
BH CV LOWER VASCULAR LEFT GREATER SAPH AK COMPRESS: NORMAL
BH CV LOWER VASCULAR LEFT GREATER SAPH BK COMPRESS: NORMAL
BH CV LOWER VASCULAR LEFT LESSER SAPH COMPRESS: NORMAL
BH CV LOWER VASCULAR LEFT MID FEMORAL AUGMENT: NORMAL
BH CV LOWER VASCULAR LEFT MID FEMORAL COMPETENT: NORMAL
BH CV LOWER VASCULAR LEFT MID FEMORAL COMPRESS: NORMAL
BH CV LOWER VASCULAR LEFT MID FEMORAL PHASIC: NORMAL
BH CV LOWER VASCULAR LEFT MID FEMORAL SPONT: NORMAL
BH CV LOWER VASCULAR LEFT PERONEAL COMPRESS: NORMAL
BH CV LOWER VASCULAR LEFT POPLITEAL AUGMENT: NORMAL
BH CV LOWER VASCULAR LEFT POPLITEAL COMPETENT: NORMAL
BH CV LOWER VASCULAR LEFT POPLITEAL COMPRESS: NORMAL
BH CV LOWER VASCULAR LEFT POPLITEAL PHASIC: NORMAL
BH CV LOWER VASCULAR LEFT POPLITEAL SPONT: NORMAL
BH CV LOWER VASCULAR LEFT POSTERIOR TIBIAL COMPRESS: NORMAL
BH CV LOWER VASCULAR LEFT PROFUNDA FEMORAL COMPRESS: NORMAL
BH CV LOWER VASCULAR LEFT PROXIMAL FEMORAL COMPRESS: NORMAL
BH CV LOWER VASCULAR LEFT SAPHENOFEMORAL JUNCTION COMPRESS: NORMAL
BH CV LOWER VASCULAR RIGHT COMMON FEMORAL AUGMENT: NORMAL
BH CV LOWER VASCULAR RIGHT COMMON FEMORAL COMPETENT: NORMAL
BH CV LOWER VASCULAR RIGHT COMMON FEMORAL COMPRESS: NORMAL
BH CV LOWER VASCULAR RIGHT COMMON FEMORAL PHASIC: NORMAL
BH CV LOWER VASCULAR RIGHT COMMON FEMORAL SPONT: NORMAL
BH CV LOWER VASCULAR RIGHT DISTAL FEMORAL COMPRESS: NORMAL
BH CV LOWER VASCULAR RIGHT GASTRONEMIUS COMPRESS: NORMAL
BH CV LOWER VASCULAR RIGHT GREATER SAPH AK COMPRESS: NORMAL
BH CV LOWER VASCULAR RIGHT GREATER SAPH BK COMPRESS: NORMAL
BH CV LOWER VASCULAR RIGHT LESSER SAPH COMPRESS: NORMAL
BH CV LOWER VASCULAR RIGHT MID FEMORAL AUGMENT: NORMAL
BH CV LOWER VASCULAR RIGHT MID FEMORAL COMPETENT: NORMAL
BH CV LOWER VASCULAR RIGHT MID FEMORAL COMPRESS: NORMAL
BH CV LOWER VASCULAR RIGHT MID FEMORAL PHASIC: NORMAL
BH CV LOWER VASCULAR RIGHT MID FEMORAL SPONT: NORMAL
BH CV LOWER VASCULAR RIGHT PERONEAL COMPRESS: NORMAL
BH CV LOWER VASCULAR RIGHT POPLITEAL AUGMENT: NORMAL
BH CV LOWER VASCULAR RIGHT POPLITEAL COMPETENT: NORMAL
BH CV LOWER VASCULAR RIGHT POPLITEAL COMPRESS: NORMAL
BH CV LOWER VASCULAR RIGHT POPLITEAL PHASIC: NORMAL
BH CV LOWER VASCULAR RIGHT POPLITEAL SPONT: NORMAL
BH CV LOWER VASCULAR RIGHT POSTERIOR TIBIAL COMPRESS: NORMAL
BH CV LOWER VASCULAR RIGHT PROFUNDA FEMORAL COMPRESS: NORMAL
BH CV LOWER VASCULAR RIGHT PROXIMAL FEMORAL COMPRESS: NORMAL
BH CV LOWER VASCULAR RIGHT SAPHENOFEMORAL JUNCTION COMPRESS: NORMAL
BILIRUB SERPL-MCNC: 6.3 MG/DL (ref 0–1.2)
BUN SERPL-MCNC: 17 MG/DL (ref 8–23)
BUN/CREAT SERPL: 17.3 (ref 7–25)
C PNEUM DNA NPH QL NAA+NON-PROBE: NOT DETECTED
CALCIUM SPEC-SCNC: 7.4 MG/DL (ref 8.6–10.5)
CHLORIDE SERPL-SCNC: 99 MMOL/L (ref 98–107)
CO2 SERPL-SCNC: 25.5 MMOL/L (ref 22–29)
CREAT SERPL-MCNC: 0.98 MG/DL (ref 0.57–1)
DEPRECATED RDW RBC AUTO: 47.5 FL (ref 37–54)
EGFRCR SERPLBLD CKD-EPI 2021: 63 ML/MIN/1.73
ERYTHROCYTE [DISTWIDTH] IN BLOOD BY AUTOMATED COUNT: 14 % (ref 12.3–15.4)
FLUAV SUBTYP SPEC NAA+PROBE: NOT DETECTED
FLUBV RNA ISLT QL NAA+PROBE: NOT DETECTED
GLOBULIN UR ELPH-MCNC: 4 GM/DL
GLUCOSE SERPL-MCNC: 168 MG/DL (ref 65–99)
HADV DNA SPEC NAA+PROBE: NOT DETECTED
HCOV 229E RNA SPEC QL NAA+PROBE: NOT DETECTED
HCOV HKU1 RNA SPEC QL NAA+PROBE: NOT DETECTED
HCOV NL63 RNA SPEC QL NAA+PROBE: NOT DETECTED
HCOV OC43 RNA SPEC QL NAA+PROBE: NOT DETECTED
HCT VFR BLD AUTO: 31.1 % (ref 34–46.6)
HCT VFR BLD AUTO: 31.4 % (ref 34–46.6)
HGB BLD-MCNC: 11.1 G/DL (ref 12–15.9)
HGB BLD-MCNC: 11.6 G/DL (ref 12–15.9)
HMPV RNA NPH QL NAA+NON-PROBE: NOT DETECTED
HPIV1 RNA ISLT QL NAA+PROBE: NOT DETECTED
HPIV2 RNA SPEC QL NAA+PROBE: NOT DETECTED
HPIV3 RNA NPH QL NAA+PROBE: NOT DETECTED
HPIV4 P GENE NPH QL NAA+PROBE: NOT DETECTED
M PNEUMO IGG SER IA-ACNC: NOT DETECTED
MCH RBC QN AUTO: 34.6 PG (ref 26.6–33)
MCHC RBC AUTO-ENTMCNC: 36.9 G/DL (ref 31.5–35.7)
MCV RBC AUTO: 93.7 FL (ref 79–97)
OSMOLALITY UR: 609 MOSM/KG
PLATELET # BLD AUTO: 135 10*3/MM3 (ref 140–450)
PMV BLD AUTO: 10.2 FL (ref 6–12)
POTASSIUM SERPL-SCNC: 3.1 MMOL/L (ref 3.5–5.2)
POTASSIUM SERPL-SCNC: 3.4 MMOL/L (ref 3.5–5.2)
PROT SERPL-MCNC: 5.8 G/DL (ref 6–8.5)
QT INTERVAL: 365 MS
QTC INTERVAL: 461 MS
RBC # BLD AUTO: 3.35 10*6/MM3 (ref 3.77–5.28)
RHINOVIRUS RNA SPEC NAA+PROBE: NOT DETECTED
RSV RNA NPH QL NAA+NON-PROBE: NOT DETECTED
SARS-COV-2 RNA NPH QL NAA+NON-PROBE: NOT DETECTED
SODIUM SERPL-SCNC: 130 MMOL/L (ref 136–145)
SODIUM UR-SCNC: <20 MMOL/L
TSH SERPL DL<=0.05 MIU/L-ACNC: 3.29 UIU/ML (ref 0.27–4.2)
URATE SERPL-MCNC: 6.5 MG/DL (ref 2.4–5.7)
WBC NRBC COR # BLD AUTO: 11.81 10*3/MM3 (ref 3.4–10.8)

## 2025-02-24 PROCEDURE — 85014 HEMATOCRIT: CPT | Performed by: NURSE PRACTITIONER

## 2025-02-24 PROCEDURE — 84550 ASSAY OF BLOOD/URIC ACID: CPT | Performed by: STUDENT IN AN ORGANIZED HEALTH CARE EDUCATION/TRAINING PROGRAM

## 2025-02-24 PROCEDURE — 84443 ASSAY THYROID STIM HORMONE: CPT | Performed by: STUDENT IN AN ORGANIZED HEALTH CARE EDUCATION/TRAINING PROGRAM

## 2025-02-24 PROCEDURE — 85027 COMPLETE CBC AUTOMATED: CPT | Performed by: NURSE PRACTITIONER

## 2025-02-24 PROCEDURE — 85018 HEMOGLOBIN: CPT | Performed by: NURSE PRACTITIONER

## 2025-02-24 PROCEDURE — 93970 EXTREMITY STUDY: CPT

## 2025-02-24 PROCEDURE — 71275 CT ANGIOGRAPHY CHEST: CPT

## 2025-02-24 PROCEDURE — 25510000001 IOPAMIDOL PER 1 ML: Performed by: EMERGENCY MEDICINE

## 2025-02-24 PROCEDURE — G0378 HOSPITAL OBSERVATION PER HR: HCPCS

## 2025-02-24 PROCEDURE — 84132 ASSAY OF SERUM POTASSIUM: CPT | Performed by: NURSE PRACTITIONER

## 2025-02-24 PROCEDURE — 74177 CT ABD & PELVIS W/CONTRAST: CPT

## 2025-02-24 PROCEDURE — 36415 COLL VENOUS BLD VENIPUNCTURE: CPT | Performed by: NURSE PRACTITIONER

## 2025-02-24 PROCEDURE — 99204 OFFICE O/P NEW MOD 45 MIN: CPT | Performed by: INTERNAL MEDICINE

## 2025-02-24 PROCEDURE — 25010000002 CEFEPIME PER 500 MG: Performed by: EMERGENCY MEDICINE

## 2025-02-24 PROCEDURE — 93970 EXTREMITY STUDY: CPT | Performed by: SURGERY

## 2025-02-24 PROCEDURE — 83935 ASSAY OF URINE OSMOLALITY: CPT | Performed by: STUDENT IN AN ORGANIZED HEALTH CARE EDUCATION/TRAINING PROGRAM

## 2025-02-24 PROCEDURE — 84300 ASSAY OF URINE SODIUM: CPT | Performed by: STUDENT IN AN ORGANIZED HEALTH CARE EDUCATION/TRAINING PROGRAM

## 2025-02-24 PROCEDURE — 80053 COMPREHEN METABOLIC PANEL: CPT | Performed by: NURSE PRACTITIONER

## 2025-02-24 PROCEDURE — 25010000002 PIPERACILLIN SOD-TAZOBACTAM PER 1 G: Performed by: NURSE PRACTITIONER

## 2025-02-24 PROCEDURE — 87040 BLOOD CULTURE FOR BACTERIA: CPT | Performed by: STUDENT IN AN ORGANIZED HEALTH CARE EDUCATION/TRAINING PROGRAM

## 2025-02-24 RX ORDER — FUROSEMIDE 40 MG/1
80 TABLET ORAL DAILY
COMMUNITY
Start: 2025-01-03 | End: 2025-04-03

## 2025-02-24 RX ORDER — ONDANSETRON 4 MG/1
4 TABLET, ORALLY DISINTEGRATING ORAL EVERY 6 HOURS PRN
Status: DISCONTINUED | OUTPATIENT
Start: 2025-02-24 | End: 2025-02-27 | Stop reason: HOSPADM

## 2025-02-24 RX ORDER — LISINOPRIL 20 MG/1
40 TABLET ORAL DAILY
Status: DISCONTINUED | OUTPATIENT
Start: 2025-02-24 | End: 2025-02-27 | Stop reason: HOSPADM

## 2025-02-24 RX ORDER — FUROSEMIDE 40 MG/1
80 TABLET ORAL DAILY
Status: DISCONTINUED | OUTPATIENT
Start: 2025-02-24 | End: 2025-02-27 | Stop reason: HOSPADM

## 2025-02-24 RX ORDER — POTASSIUM CHLORIDE 1500 MG/1
40 TABLET, EXTENDED RELEASE ORAL EVERY 4 HOURS
Status: COMPLETED | OUTPATIENT
Start: 2025-02-24 | End: 2025-02-24

## 2025-02-24 RX ORDER — ONDANSETRON 2 MG/ML
4 INJECTION INTRAMUSCULAR; INTRAVENOUS EVERY 6 HOURS PRN
Status: DISCONTINUED | OUTPATIENT
Start: 2025-02-24 | End: 2025-02-27 | Stop reason: HOSPADM

## 2025-02-24 RX ORDER — POLYETHYLENE GLYCOL 3350 17 G/17G
17 POWDER, FOR SOLUTION ORAL DAILY PRN
Status: DISCONTINUED | OUTPATIENT
Start: 2025-02-24 | End: 2025-02-27 | Stop reason: HOSPADM

## 2025-02-24 RX ORDER — BISACODYL 5 MG/1
5 TABLET, DELAYED RELEASE ORAL DAILY PRN
Status: DISCONTINUED | OUTPATIENT
Start: 2025-02-24 | End: 2025-02-27 | Stop reason: HOSPADM

## 2025-02-24 RX ORDER — ALUMINA, MAGNESIA, AND SIMETHICONE 2400; 2400; 240 MG/30ML; MG/30ML; MG/30ML
15 SUSPENSION ORAL EVERY 6 HOURS PRN
Status: DISCONTINUED | OUTPATIENT
Start: 2025-02-24 | End: 2025-02-27 | Stop reason: HOSPADM

## 2025-02-24 RX ORDER — POTASSIUM CHLORIDE 1500 MG/1
40 TABLET, EXTENDED RELEASE ORAL EVERY 4 HOURS
Status: DISCONTINUED | OUTPATIENT
Start: 2025-02-24 | End: 2025-02-24

## 2025-02-24 RX ORDER — ALBUMIN (HUMAN) 12.5 G/50ML
12.5 SOLUTION INTRAVENOUS ONCE
OUTPATIENT
Start: 2025-02-24 | End: 2025-02-24

## 2025-02-24 RX ORDER — AMOXICILLIN 250 MG
2 CAPSULE ORAL 2 TIMES DAILY PRN
Status: DISCONTINUED | OUTPATIENT
Start: 2025-02-24 | End: 2025-02-27 | Stop reason: HOSPADM

## 2025-02-24 RX ORDER — MULTIVITAMIN WITH IRON
1000 TABLET ORAL DAILY
Status: DISCONTINUED | OUTPATIENT
Start: 2025-02-24 | End: 2025-02-27 | Stop reason: HOSPADM

## 2025-02-24 RX ORDER — BISACODYL 10 MG
10 SUPPOSITORY, RECTAL RECTAL DAILY PRN
Status: DISCONTINUED | OUTPATIENT
Start: 2025-02-24 | End: 2025-02-27 | Stop reason: HOSPADM

## 2025-02-24 RX ORDER — NITROGLYCERIN 0.4 MG/1
0.4 TABLET SUBLINGUAL
Status: DISCONTINUED | OUTPATIENT
Start: 2025-02-24 | End: 2025-02-27 | Stop reason: HOSPADM

## 2025-02-24 RX ORDER — SODIUM CHLORIDE 0.9 % (FLUSH) 0.9 %
10 SYRINGE (ML) INJECTION AS NEEDED
Status: DISCONTINUED | OUTPATIENT
Start: 2025-02-24 | End: 2025-02-27 | Stop reason: HOSPADM

## 2025-02-24 RX ORDER — IOPAMIDOL 755 MG/ML
100 INJECTION, SOLUTION INTRAVASCULAR
Status: COMPLETED | OUTPATIENT
Start: 2025-02-24 | End: 2025-02-24

## 2025-02-24 RX ORDER — FOLIC ACID 0.4 MG
800 TABLET ORAL DAILY
Status: DISCONTINUED | OUTPATIENT
Start: 2025-02-24 | End: 2025-02-27 | Stop reason: HOSPADM

## 2025-02-24 RX ADMIN — POTASSIUM CHLORIDE 40 MEQ: 1500 TABLET, EXTENDED RELEASE ORAL at 18:43

## 2025-02-24 RX ADMIN — PIPERACILLIN AND TAZOBACTAM 3.38 G: 3; .375 INJECTION, POWDER, FOR SOLUTION INTRAVENOUS at 23:15

## 2025-02-24 RX ADMIN — PIPERACILLIN AND TAZOBACTAM 3.38 G: 3; .375 INJECTION, POWDER, FOR SOLUTION INTRAVENOUS at 12:31

## 2025-02-24 RX ADMIN — POTASSIUM CHLORIDE 40 MEQ: 1500 TABLET, EXTENDED RELEASE ORAL at 14:40

## 2025-02-24 RX ADMIN — POTASSIUM CHLORIDE 40 MEQ: 1500 TABLET, EXTENDED RELEASE ORAL at 23:15

## 2025-02-24 RX ADMIN — LISINOPRIL 40 MG: 20 TABLET ORAL at 14:40

## 2025-02-24 RX ADMIN — CEFEPIME 2000 MG: 2 INJECTION, POWDER, FOR SOLUTION INTRAVENOUS at 01:25

## 2025-02-24 RX ADMIN — FUROSEMIDE 80 MG: 40 TABLET ORAL at 14:41

## 2025-02-24 RX ADMIN — Medication 1000 MCG: at 14:40

## 2025-02-24 RX ADMIN — IOPAMIDOL 95 ML: 755 INJECTION, SOLUTION INTRAVENOUS at 00:16

## 2025-02-24 NOTE — ED PROVIDER NOTES
EMERGENCY DEPARTMENT ENCOUNTER    Room Number:  09/09  PCP: Johanny Hall MD  Patient Care Team:  Johanny Hall MD as PCP - General (Internal Medicine)   Independent Historians: Patient    HPI:  Chief Complaint: Chest pain, cough, nausea    A complete HPI/ROS/PMH/PSH/SH/FH are unobtainable due to: None    Chronic or social conditions impacting patient care (Social Determinants of Health): None  (Financial Resource Strain / Food Insecurity / Transportation Needs / Physical Activity / Stress / Social Connections / Intimate Partner Violence / Housing Stability)    Context: Lacey Ramirez is a 69 y.o. female who presents to the ED c/o acute chest pain which radiates to right shoulder blade for the past week..  Patient went to urgent care and was directed to the ED.  Patient has history of recently diagnosed cirrhosis.  Patient reports she has had cough congestion and diarrhea.  Also reports some nausea.  Has a little bit of upper abdominal pain no lower abdominal pain no dysuria no fever chills.  Patient also notes that her right leg is more swollen than her left leg.  States has been present for over a week.    Review of prior external notes (non-ED) -and- Review of prior external test results outside of this encounter: I reviewed patient's GI note from 2/11/2025    Prescription drug monitoring program review:         PAST MEDICAL HISTORY  Active Ambulatory Problems     Diagnosis Date Noted    Benign essential hypertension 12/22/2017    Hyperlipidemia 12/22/2017    Impaired fasting glucose 12/22/2017    Adiposity 12/22/2017     Resolved Ambulatory Problems     Diagnosis Date Noted    No Resolved Ambulatory Problems     Past Medical History:   Diagnosis Date    Hypertension          PAST SURGICAL HISTORY  Past Surgical History:   Procedure Laterality Date    APPENDECTOMY      CHOLECYSTECTOMY      HYSTERECTOMY  1983    TONSILLECTOMY           FAMILY HISTORY  Family History   Problem Relation Age of Onset     Emphysema Mother     Heart disease Maternal Uncle     Alcohol abuse Maternal Uncle     Hypertension Maternal Grandmother     Arthritis Maternal Grandmother     Heart disease Maternal Grandfather          SOCIAL HISTORY  Social History     Socioeconomic History    Marital status:    Tobacco Use    Smoking status: Never    Smokeless tobacco: Never   Substance and Sexual Activity    Alcohol use: No    Drug use: No    Sexual activity: Defer         ALLERGIES  Propoxyphene, Benicar [olmesartan], Fiorinal [butalbital-aspirin-caffeine], and Propoxyphene-acetaminophen        REVIEW OF SYSTEMS  Review of Systems  Included in HPI  All systems reviewed and negative except for those discussed in HPI.      PHYSICAL EXAM    I have reviewed the triage vital signs and nursing notes.    ED Triage Vitals   Temp Heart Rate Resp BP SpO2   02/23/25 2018 02/23/25 2015 02/23/25 2015 02/23/25 2015 02/23/25 2015   98.9 °F (37.2 °C) 98 18 132/67 99 %      Temp src Heart Rate Source Patient Position BP Location FiO2 (%)   02/23/25 2018 02/23/25 2015 02/23/25 2015 02/23/25 2015 --   Oral Monitor Sitting Left arm        Physical Exam  GENERAL: alert, no acute distress  SKIN: Warm, dry  HENT: Normocephalic, atraumatic  EYES: scleral icterus is present  CV: regular rhythm, regular rate, 2+ pitting edema to left lower extremity, 3+ pitting edema to right lower extremity, distal feet are well-perfused palpable pulses  RESPIRATORY: normal effort, lungs clear  ABDOMEN: soft, nontender, nondistended  MUSCULOSKELETAL: no deformity  NEURO: alert, moves all extremities, follows commands                                                               LAB RESULTS  Recent Results (from the past 24 hours)   ECG 12 Lead ED Triage Standing Order; Chest Pain    Collection Time: 02/23/25  8:13 PM   Result Value Ref Range    QT Interval 365 ms    QTC Interval 461 ms   Comprehensive Metabolic Panel    Collection Time: 02/23/25  8:25 PM    Specimen: Arm, Left;  Blood   Result Value Ref Range    Glucose 104 (H) 65 - 99 mg/dL    BUN 15 8 - 23 mg/dL    Creatinine 1.09 (H) 0.57 - 1.00 mg/dL    Sodium 135 (L) 136 - 145 mmol/L    Potassium 4.1 3.5 - 5.2 mmol/L    Chloride 100 98 - 107 mmol/L    CO2 29.0 22.0 - 29.0 mmol/L    Calcium 8.0 (L) 8.6 - 10.5 mg/dL    Total Protein 6.6 6.0 - 8.5 g/dL    Albumin 2.2 (L) 3.5 - 5.2 g/dL    ALT (SGPT) 47 (H) 1 - 33 U/L    AST (SGOT) 83 (H) 1 - 32 U/L    Alkaline Phosphatase 269 (H) 39 - 117 U/L    Total Bilirubin 7.0 (H) 0.0 - 1.2 mg/dL    Globulin 4.4 gm/dL    A/G Ratio 0.5 g/dL    BUN/Creatinine Ratio 13.8 7.0 - 25.0    Anion Gap 6.0 5.0 - 15.0 mmol/L    eGFR 55.4 (L) >60.0 mL/min/1.73   High Sensitivity Troponin T    Collection Time: 02/23/25  8:25 PM    Specimen: Arm, Left; Blood   Result Value Ref Range    HS Troponin T 8 <14 ng/L   Green Top (Gel)    Collection Time: 02/23/25  8:25 PM   Result Value Ref Range    Extra Tube Hold for add-ons.    Lavender Top    Collection Time: 02/23/25  8:25 PM   Result Value Ref Range    Extra Tube hold for add-on    Gold Top - SST    Collection Time: 02/23/25  8:25 PM   Result Value Ref Range    Extra Tube Hold for add-ons.    Light Blue Top    Collection Time: 02/23/25  8:25 PM   Result Value Ref Range    Extra Tube Hold for add-ons.    CBC Auto Differential    Collection Time: 02/23/25  8:25 PM    Specimen: Arm, Left; Blood   Result Value Ref Range    WBC 15.54 (H) 3.40 - 10.80 10*3/mm3    RBC 3.82 3.77 - 5.28 10*6/mm3    Hemoglobin 13.2 12.0 - 15.9 g/dL    Hematocrit 37.3 34.0 - 46.6 %    MCV 97.6 (H) 79.0 - 97.0 fL    MCH 34.6 (H) 26.6 - 33.0 pg    MCHC 35.4 31.5 - 35.7 g/dL    RDW 14.5 12.3 - 15.4 %    RDW-SD 51.3 37.0 - 54.0 fl    MPV 10.1 6.0 - 12.0 fL    Platelets 165 140 - 450 10*3/mm3    Neutrophil % 81.9 (H) 42.7 - 76.0 %    Lymphocyte % 8.4 (L) 19.6 - 45.3 %    Monocyte % 8.3 5.0 - 12.0 %    Eosinophil % 0.3 0.3 - 6.2 %    Basophil % 0.3 0.0 - 1.5 %    Immature Grans % 0.8 (H) 0.0 - 0.5 %     Neutrophils, Absolute 12.73 (H) 1.70 - 7.00 10*3/mm3    Lymphocytes, Absolute 1.31 0.70 - 3.10 10*3/mm3    Monocytes, Absolute 1.29 (H) 0.10 - 0.90 10*3/mm3    Eosinophils, Absolute 0.04 0.00 - 0.40 10*3/mm3    Basophils, Absolute 0.05 0.00 - 0.20 10*3/mm3    Immature Grans, Absolute 0.12 (H) 0.00 - 0.05 10*3/mm3    nRBC 0.0 0.0 - 0.2 /100 WBC   Protime-INR    Collection Time: 02/23/25  8:25 PM    Specimen: Arm, Left; Blood   Result Value Ref Range    Protime 19.9 (H) 11.7 - 14.2 Seconds    INR 1.68 (H) 0.90 - 1.10   aPTT    Collection Time: 02/23/25  8:25 PM    Specimen: Arm, Left; Blood   Result Value Ref Range    PTT 35.9 (H) 22.7 - 35.4 seconds   High Sensitivity Troponin T 1Hr    Collection Time: 02/23/25  9:33 PM    Specimen: Arm, Left; Blood   Result Value Ref Range    HS Troponin T 8 <14 ng/L    Troponin T Numeric Delta 0 Abnormal if >/=3 ng/L   Procalcitonin    Collection Time: 02/23/25  9:33 PM    Specimen: Arm, Left; Blood   Result Value Ref Range    Procalcitonin 0.48 (H) 0.00 - 0.25 ng/mL   Respiratory Panel PCR w/COVID-19(SARS-CoV-2) [facility] In-House, NP Swab in [facility], 2 HR TAT - Swab, Nasopharynx    Collection Time: 02/23/25 11:30 PM    Specimen: Nasopharynx; Swab   Result Value Ref Range    ADENOVIRUS, PCR Not Detected Not Detected    Coronavirus 229E Not Detected Not Detected    Coronavirus HKU1 Not Detected Not Detected    Coronavirus NL63 Not Detected Not Detected    Coronavirus OC43 Not Detected Not Detected    COVID19 Not Detected Not Detected - Ref. Range    Human Metapneumovirus Not Detected Not Detected    Human Rhinovirus/Enterovirus Not Detected Not Detected    Influenza A PCR Not Detected Not Detected    Influenza B PCR Not Detected Not Detected    Parainfluenza Virus 1 Not Detected Not Detected    Parainfluenza Virus 2 Not Detected Not Detected    Parainfluenza Virus 3 Not Detected Not Detected    Parainfluenza Virus 4 Not Detected Not Detected    RSV, PCR Not  Detected Not Detected    Bordetella pertussis pcr Not Detected Not Detected    Bordetella parapertussis PCR Not Detected Not Detected    Chlamydophila pneumoniae PCR Not Detected Not Detected    Mycoplasma pneumo by PCR Not Detected Not Detected       I ordered the above labs and independently reviewed the results.        RADIOLOGY  CT Abdomen Pelvis With Contrast, CT Angiogram Chest    Result Date: 2/24/2025  CTA CHEST, CT ABDOMEN AND PELVIS  HISTORY: chest pain, radiates to right shoulder blade, +soa  COMPARISON: None  TECHNIQUE: CT angiography was performed of the chest with axial images as well as coronal and sagittal reformatted MIP images provided following the administration of IV contrast.  3-D surface rendered reformats were obtained of the pulmonary arteries and aorta.  CT of the abdomen and pelvis obtained following administration of IV contrast. Coronal and sagittal reconstructions were obtained.   Radiation dose reduction techniques were utilized, including automated exposure control, and exposure modulation based on body size.  FINDINGS:  Chest CTA: There is a small left pleural effusion with left posterior medial basilar compressive atelectasis. Both lungs are otherwise normally aerated. The thoracic aorta is normal in caliber, and there is no evidence of dissection.  There is no suspicious mediastinal adenopathy or other mass.  Bolus timing is good and there is no evidence of pulmonary embolism.  Abdomen: There is cirrhotic liver morphology, but no discrete hepatic mass is seen and there is no evidence of biliary obstruction. The spleen is normal in size and the pancreas is unremarkable. The SMV, splenic vein and main portal vein are normally patent.  Both adrenal glands are normal.  Both kidneys are normal.  The aorta is normal in caliber.   There is mild to moderate ascites, but there is no loculated fluid or air collection. There are a few mildly prominent para-aortic and aortocaval lymph nodes,  but there is no suspicious abdominal or retroperitoneal mass.  There is prominent right colonic wall thickening, particularly in the lower right colon and cecum. There is lesser wall thickening in the proximal transverse colon.   Pelvis: There is pelvic ascites, but there is no loculated fluid collection. There is no pelvic adenopathy or other soft tissue mass. There is no CT evidence of hernia or bowel obstruction.  There is no acute bony abnormality.       1.  Pulmonary arteries are well-opacified, and there is no evidence of pulmonary embolism.  2.  There is a small left pleural effusion, and left posteromedial basilar compressive atelectasis.  3.  CT abdomen/pelvis demonstrates right colonic wall thickening most prominent in the lower right colon and cecum suggesting acute colitis.  4.  There is also cirrhotic liver morphology and there is mild to moderate ascites. No evidence of renal or bowel or biliary obstruction.      This report was finalized on 2/24/2025 12:38 AM by Dr. Jake Hernandez M.D on Workstation: [id number]      XR Chest 1 View    Result Date: 2/23/2025  CXR ONE VIEW  HISTORY: Chest Pain Triage Protocol  COMPARISON: None  TECHNIQUE: single portable AP       The heart size is within normal limits.  The lungs are normally aerated. There is no pleural effusion or pneumothorax.    This report was finalized on 2/23/2025 8:47 PM by Dr. Jake Hernandez M.D on Workstation: [id number]       I ordered the above noted radiological studies. Reviewed by me and discussed with radiologist.  See dictation for official radiology interpretation.      PROCEDURES    Procedures      MEDICATIONS GIVEN IN ER    Medications   sodium chloride 0.9 % flush 10 mL (has no administration in time range)   aspirin tablet 325 mg (has no administration in time range)   sodium chloride 0.9 % flush 10 mL (has no administration in time range)   nitroglycerin (NITROSTAT) SL tablet 0.4 mg (has no administration in time range)    sennosides-docusate (PERICOLACE) 8.6-50 MG per tablet 2 tablet (has no administration in time range)     And   polyethylene glycol (MIRALAX) packet 17 g (has no administration in time range)     And   bisacodyl (DULCOLAX) EC tablet 5 mg (has no administration in time range)     And   bisacodyl (DULCOLAX) suppository 10 mg (has no administration in time range)   ondansetron ODT (ZOFRAN-ODT) disintegrating tablet 4 mg (has no administration in time range)     Or   ondansetron (ZOFRAN) injection 4 mg (has no administration in time range)   aluminum-magnesium hydroxide-simethicone (MAALOX MAX) 400-400-40 MG/5ML suspension 15 mL (has no administration in time range)   iopamidol (ISOVUE-370) 76 % injection 100 mL (95 mL Intravenous Given by Other 2/24/25 0016)   cefepime 2000 mg IVPB in 100 mL NS (MBP) (0 mg Intravenous Stopped 2/24/25 0221)         ORDERS PLACED DURING THIS VISIT:  Orders Placed This Encounter   Procedures    Respiratory Panel PCR w/COVID-19(SARS-CoV-2) [facility] In-House, NP Swab in UTM/VTM, 2 HR TAT - Swab, Nasopharynx    XR Chest 1 View    CT Abdomen Pelvis With Contrast    CT Angiogram Chest    [place] Draw    Comprehensive Metabolic Panel    High Sensitivity Troponin T    CBC Auto Differential    High Sensitivity Troponin T 1Hr    Protime-INR    aPTT    Procalcitonin    Comprehensive Metabolic Panel    CBC (No Diff)    NPO Diet NPO Type: Strict NPO    Undress & Gown    Continuous Pulse Oximetry    Vital Signs    Intake & Output    Oral Care    Place Sequential Compression Device    Maintain Sequential Compression Device    Maintain IV Access    Telemetry - Place Orders & Notify Provider of Results When Patient Experiences Acute Chest Pain, Dysrhythmia or Respiratory Distress    May Be Off Telemetry for Tests    Up With Assistance    Code Status and Medical Interventions: CPR (Attempt to Resuscitate); Full Support    LHA (on-call MD unless specified) Details    Oxygen Therapy- Nasal  Cannula; Titrate 1-6 LPM Per SpO2; 90 - 95%    Incentive Spirometry    ECG 12 Lead ED Triage Standing Order; Chest Pain    ECG 12 Lead ED Triage Standing Order; Chest Pain    Insert Peripheral IV    Initiate Observation Status    CBC & Differential    Green Top (Gel)    Lavender Top    Gold Top - SST    Light Blue Top         PROGRESS, DATA ANALYSIS, CONSULTS, AND MEDICAL DECISION MAKING    All labs have been independently interpreted by me.  All radiology studies have been reviewed by me and discussed with radiologist dictating the report.   EKG's independently viewed and interpreted by me.  Discussion below represents my analysis of pertinent findings related to patient's condition, differential diagnosis, treatment plan and final disposition.    Differential diagnosis includes but is not limited to:  Muscle strain, costochondritis, myositis, pleurisy, rib fracture, intercostal neuritis, herpes zoster, tumor, myocardial infarction, coronary syndrome, unstable angina, angina, aortic dissection, mitral valve prolapse, pericarditis, palpitations, pulmonary embolus, pneumonia, pneumothorax, lung cancer, GERD, esophagitis, esophageal spasm  .    Clinical Scores: HEART Score: 4              ED Course as of 02/24/25 0415   Sun Feb 23, 2025   2315 EKG          EKG time: 2013  Rhythm/Rate: Sinus rhythm rate 95  P waves and OK: Normal  QRS, axis: Narrow regular  ST and T waves: Normal    Interpreted Contemporaneously by me, independently viewed [TJ]   2321 Troponin T Numeric Delta: 0 [TJ]   2321 Creatinine(!): 1.09 [TJ]   2321 Potassium: 4.1 [TJ]   2321 ALT (SGPT)(!): 47 [TJ]   2321 AST (SGOT)(!): 83 [TJ]   2321 Total Bilirubin(!): 7.0 [TJ]   2321 Alkaline Phosphatase(!): 269 [TJ]   2321 WBC(!): 15.54 [TJ]   2321 Hemoglobin: 13.2 [TJ]   2321 Platelets: 165 [TJ]   2326 I have independently reviewed patient's chest x-ray; my interpretation is no infiltrate, no pneumothorax [TJ]   2359 Procalcitonin(!): 0.48 [TJ]      ED  Course User Index  [TJ] Jake Tipton MD             PPE: The patient wore a mask and I wore an N95 mask throughout the entire patient encounter.       AS OF 04:15 EST VITALS:    BP - 123/73  HR - 89  TEMP - 98.9 °F (37.2 °C) (Oral)  O2 SATS - 99%        DIAGNOSIS  Final diagnoses:   Other ascites   Cirrhosis of liver with ascites, unspecified hepatic cirrhosis type   Chest pain, unspecified type   Leg swelling         DISPOSITION  ED Disposition       ED Disposition   Decision to Admit    Condition   --    Comment   Level of Care: Telemetry [5]   Diagnosis: Chest pain [389343]   Admitting Physician: REGINA GARCIA [[id number]]   Is patient appropriate for Inpatient Observation Unit?: No [0]                    Note Disclaimer: At [facility], we believe that sharing information builds trust and better relationships. You are receiving this note because you recently visited [facility]. It is possible you will see health information before a provider has talked with you about it. This kind of information can be easy to misunderstand. To help you fully understand what it means for your health, we urge you to discuss this note with your provider.         Jake Tipton MD  02/24/25 7837

## 2025-02-24 NOTE — CONSULTS
[facility]  Consult Note    GI Provider:  Ollie Hurd M.D.    ASSESSMENT/PLAN:    Patient is a 69-year-old F admitted for chest pain felt in the midsternal area as well as right shoulder area and radiating to the root of neck.  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  Patient is scheduled to have an EGD and colonoscopy in the first week of April 2025.  Her MELD sodium score is 24.  There is no evidence of GI bleed, hepatic encephalopathy, fever or systemic symptoms.  Patient does have discernible ascites on the CT scan  There is also possibility of DVT of the right lower extremity, the latter needs Doppler evaluation.    Since patient is already under care of the [facility] for cirrhosis and is already being evaluated there, there is little point in restarting the evaluation again.    Suggest evaluate the chest pain as well as right lower extremity swelling for DVT on its own merits, and treat it as dictated by the assessment.    GI service will, sign off.  Please call if needed.  Above plan was discussed with the patient and her  present at the bedside.      Reason for Consultation:  I have been asked by Dr. Tipton to see Arslan Ramirez, in consultation for evaluation of cirrhosis.        History of Present Illness:  Arslan Ramirez is a 69-year-old female who has recently been diagnosed with nonalcoholic fatty liver disease with cirrhosis at [facility] in November 2024.  Patient says she has multitude of tests and still counting.  She is already scheduled to have an outpatient elective upper endoscopy and colonoscopy in April 2025.  Patient apparently came into the hospital with chest pain felt in the midsternal area right shoulder and the root of neck.  In addition she mentions recent swelling in the right leg much more than the left leg.  Patient has been treated with diuretics at [facility] by GI physicians.      Past Medical History:    Past Medical History:   Diagnosis Date    Hyperlipidemia     Hypertension        Past Surgical History:    Past Surgical History:   Procedure Laterality Date    APPENDECTOMY      CHOLECYSTECTOMY      HYSTERECTOMY  1983    TONSILLECTOMY         Social History:    Social History     Socioeconomic History    Marital status:    Tobacco Use    Smoking status: Never    Smokeless tobacco: Never   Substance and Sexual Activity    Alcohol use: No    Drug use: No    Sexual activity: Defer       Family History:  family history includes Alcohol abuse in her maternal uncle; Arthritis in her maternal grandmother; Emphysema in her mother; Heart disease in her maternal grandfather and maternal uncle; Hypertension in her maternal grandmother.    Allergies:  is allergic to propoxyphene, benicar [olmesartan], fiorinal [butalbital-aspirin-caffeine], and propoxyphene-acetaminophen.    Prior to Admission Medications:    (Not in a hospital admission)      Current Inpatient Medications:    Current Facility-Administered Medications   Medication Dose Route Frequency Provider Last Rate Last Admin    aluminum-magnesium hydroxide-simethicone (MAALOX MAX) 400-400-40 MG/5ML suspension 15 mL  15 mL Oral Q6H PRN Dayami Cisse APRN        aspirin tablet 325 mg  325 mg Oral Once Jake Tipton MD        sennosides-docusate (PERICOLACE) 8.6-50 MG per tablet 2 tablet  2 tablet Oral BID PRN Dayami Cisse APRN        And    polyethylene glycol (MIRALAX) packet 17 g  17 g Oral Daily PRN Dayami Cisse APRN        And    bisacodyl (DULCOLAX) EC tablet 5 mg  5 mg Oral Daily PRN Dayami Cisse APRN        And    bisacodyl (DULCOLAX) suppository 10 mg  10 mg Rectal Daily PRN Dayami Cisse APRN        Calcium Replacement - Follow Nurse / BPA Driven Protocol   Not Applicable PRN Thomas Najera DO        Magnesium Standard Dose Replacement - Follow Nurse / BPA  Driven Protocol   Not Applicable PRN Thomas Najera DO        nitroglycerin (NITROSTAT) SL tablet 0.4 mg  0.4 mg Sublingual Q5 Min PRN Dayami Cisse APRN        ondansetron ODT (ZOFRAN-ODT) disintegrating tablet 4 mg  4 mg Oral Q6H PRN Dayami Cisse APRN        Or    ondansetron (ZOFRAN) injection 4 mg  4 mg Intravenous Q6H PRN Dayami Cisse APRN        Phosphorus Replacement - Follow Nurse / BPA Driven Protocol   Not Applicable PRN Thomas Najera DO        piperacillin-tazobactam (ZOSYN) 3.375 g IVPB in 100 mL NS MBP (CD)  3.375 g Intravenous Once Chirag Webb APRN   3.375 g at 02/24/25 1231    piperacillin-tazobactam (ZOSYN) 3.375 g IVPB in 100 mL NS MBP (CD)  3.375 g Intravenous Q8H Chirag Webb APRN        Potassium Replacement - Follow Nurse / BPA Driven Protocol   Not Applicable PRN Thomas Najera DO        sodium chloride 0.9 % flush 10 mL  10 mL Intravenous PRN Jake Tipton MD        sodium chloride 0.9 % flush 10 mL  10 mL Intravenous PRN Dayami Cisse APRN         Current Outpatient Medications   Medication Sig Dispense Refill    Cholecalciferol (VITAMIN D) 1000 units tablet Take 2 tablets by mouth Daily. 60 tablet 6    folic acid (FOLVITE) 800 MCG tablet Take 1 tablet by mouth Daily. 30 tablet 6    furosemide (LASIX) 40 MG tablet Take 2 tablets by mouth Daily.      lisinopril (PRINIVIL,ZESTRIL) 40 MG tablet TAKE ONE TABLET BY MOUTH DAILY 30 tablet 2    vitamin B-12 (CYANOCOBALAMIN) 100 MCG tablet Take 10 tablets by mouth.      vitamin E 1000 UNIT capsule Take 1 capsule by mouth Daily. (Patient taking differently: Take 400 Units by mouth Daily.) 30 capsule 6    ibuprofen (ADVIL,MOTRIN) 200 MG tablet Take 1 tablet by mouth Every 6 (Six) Hours As Needed for Mild Pain.      loratadine-pseudoephedrine (CLARITIN-D 24 HOUR)  MG per 24 hr tablet Take 1 tablet by mouth Daily. 30 tablet 5     Anti-infectives:[unfilled]    Review of Systems:  CONSTITUTIONAL:  (No) weight loss, (no) fever, (-) chills (none) fatigue   EYES: (-) new visual loss /changes   ENTM: (-) earache,  (-) sorethroat, (-) dysphagia (-) odynophagia (-) oral lesions  PULMONARY: (-) SOB, (-) cough, (-) hemoptysis   CARDIOVASCULAR: (-) chest pain , (-) orthopnea, (-) palpitation (-) leg swelling   GI: (No) abdominal pain (no none no) melena (none) hematochezia (none) hematemesis (none none none) nausea (none) vomiting   : (-) dysuria (-) hematuria (-) frequency (-) urgency (-) incontinence   HEMATOLOGY: (-) easy bruisibility (-) bleeding nose (-) blood transfusions  SKIN: (-) rash (-) non-healing ulcers (-) jaundice  NEUROLOGICAL: (-) paralysis (-) loss of consciousness (-) falls/fainting  PSYCHIATRIC: (-) hallucination (-) mood disturbances (-) confusion      Vitals 24 Hours ([Min - Max] (Last Recorded Value)):     [unfilled]  Wt Readings from Last 3 Encounters:   02/23/25 111 kg (245 lb)   07/02/18 113 kg (248 lb 12.8 oz)   12/22/17 109 kg (240 lb)       PHYSICAL EXAM:  GENERAL APPEARANCE: Alert, cooperative, in no acute distress  Eyes: EOMI.  Pupils equal bilat.  No scleral icterus  Neck: supple, trachea midline, no anterior/posterior LAD  Lungs/Pulmonary: Excellent breath sounds, somewhat poor at lung bases  Heart/Chest: None nontender chest wall  GI: Soft and obese without any tenderness or palpable organomegaly  Extremities: Extremities normal, atraumatic, no cyanosis or edema   Skin: No rash.  No jaundice.  Neuro/Psych:  A&O, normal mood, able to move all 4 ext, No asterixis       LABS:  Results from last 7 days   Lab Units 02/24/25 0621 02/23/25 2025   WBC 10*3/mm3 11.81* 15.54*   HEMOGLOBIN g/dL 11.6* 13.2   HEMATOCRIT % 31.4* 37.3   MCV fL 93.7 97.6*   PLATELETS 10*3/mm3 135* 165   PROTIME Seconds  --  19.9*   INR   --  1.68*   AST (SGOT) U/L 70* 83*   ALT (SGPT) U/L 38* 47*   ALK PHOS U/L 225* 269*   BILIRUBIN mg/dL 6.3* 7.0*   POTASSIUM mmol/L 3.1* 4.1   BUN mg/dL 17 15   CREATININE  mg/dL 0.98 1.09*       IMAGING:  [unfilled]

## 2025-02-24 NOTE — H&P
Patient Name:  Lacey Ramirez  YOB: 1955  MRN:  [id number]  Admit Date:  2/23/2025  Patient Care Team:  Johanny Hall MD as PCP - General (Internal Medicine)      Subjective   History Present Illness     Chief Complaint   Patient presents with    Chest Pain    Shortness of Breath       Ms. Ramirez is a 69 y.o. non-smoker with a history of HTN, HLD, non-alcoholic cirrhosis who presents to [facility] ER with complaints of acute chest pain radiating to right shoulder blade for the past week for acute chest pain.    Follows with GI--Dr. Chaudhry at [place]--since 1/2025 after elevated bilirubin & now would prefer to transfer to [facility] group.    Started having right shoulder pain radiating across front of chest--without injury--& shortness breath with exertion for about one week following tiredness for a couple weeks & sought urgent care evaluation & then advised ER evaluation given abnormal EKG.    Recommendation pending hospital course.  Details below.    History of Present Illness    Review of Systems   Constitutional:  Negative for chills and fever.   HENT:  Negative for congestion and postnasal drip.    Respiratory:  Positive for cough (dry) and shortness of breath.    Cardiovascular:  Positive for chest pain and leg swelling.   Gastrointestinal:  Negative for abdominal pain, constipation, diarrhea, nausea and vomiting.   Endocrine: Negative for polydipsia, polyphagia and polyuria.   Genitourinary:  Negative for difficulty urinating and dysuria.   Musculoskeletal:  Positive for gait problem (due to lethargy). Negative for myalgias.   Skin:  Negative for rash and wound.   Neurological:  Negative for syncope and light-headedness.   Psychiatric/Behavioral:  Negative for confusion and hallucinations.         Personal History     Past Medical History:   Diagnosis Date    Hyperlipidemia     Hypertension      Past Surgical History:   Procedure Laterality Date    APPENDECTOMY      CHOLECYSTECTOMY       HYSTERECTOMY  1983    TONSILLECTOMY       Family History   Problem Relation Age of Onset    Emphysema Mother     Heart disease Maternal Uncle     Alcohol abuse Maternal Uncle     Hypertension Maternal Grandmother     Arthritis Maternal Grandmother     Heart disease Maternal Grandfather      Social History     Tobacco Use    Smoking status: Never    Smokeless tobacco: Never   Vaping Use    Vaping status: Never Used   Substance Use Topics    Alcohol use: No    Drug use: No     No current facility-administered medications on file prior to encounter.     Current Outpatient Medications on File Prior to Encounter   Medication Sig Dispense Refill    Cholecalciferol (VITAMIN D) 1000 units tablet Take 2 tablets by mouth Daily. 60 tablet 6    folic acid (FOLVITE) 800 MCG tablet Take 1 tablet by mouth Daily. 30 tablet 6    furosemide (LASIX) 40 MG tablet Take 2 tablets by mouth Daily.      lisinopril (PRINIVIL,ZESTRIL) 40 MG tablet TAKE ONE TABLET BY MOUTH DAILY 30 tablet 2    vitamin B-12 (CYANOCOBALAMIN) 100 MCG tablet Take 10 tablets by mouth.      vitamin E 1000 UNIT capsule Take 1 capsule by mouth Daily. (Patient taking differently: Take 400 Units by mouth Daily.) 30 capsule 6    ibuprofen (ADVIL,MOTRIN) 200 MG tablet Take 1 tablet by mouth Every 6 (Six) Hours As Needed for Mild Pain.      loratadine-pseudoephedrine (CLARITIN-D 24 HOUR)  MG per 24 hr tablet Take 1 tablet by mouth Daily. 30 tablet 5    [DISCONTINUED] aspirin 81 MG EC tablet Take 81 mg by mouth daily.       Allergies   Allergen Reactions    Propoxyphene Nausea And Vomiting    Benicar [Olmesartan] Swelling    Fiorinal [Butalbital-Aspirin-Caffeine]     Propoxyphene-Acetaminophen        Objective    Objective     Vital Signs  Temp:  [97.9 °F (36.6 °C)-98.9 °F (37.2 °C)] 97.9 °F (36.6 °C)  Heart Rate:  [89-98] 94  Resp:  [18] 18  BP: (119-134)/(48-73) 121/59  SpO2:  [97 %-99 %] 97 %  on   ;   Device (Oxygen Therapy): room air  Body mass index is 38.95  kg/m².    Physical Exam  Constitutional:       General: She is not in acute distress.     Appearance: She is obese. She is not toxic-appearing.   HENT:      Head: Normocephalic and atraumatic.   Eyes:      Extraocular Movements: Extraocular movements intact.      Conjunctiva/sclera: Conjunctivae normal.   Cardiovascular:      Rate and Rhythm: Normal rate and regular rhythm.   Pulmonary:      Effort: Pulmonary effort is normal.      Comments: Diminished on expiration anteriorly  Abdominal:      General: Bowel sounds are normal. There is no distension.      Palpations: Abdomen is soft.      Tenderness: There is no abdominal tenderness. There is no guarding.   Musculoskeletal:      Cervical back: Normal range of motion and neck supple.      Right lower leg: Edema present.      Left lower leg: Edema present.   Skin:     General: Skin is warm and dry.   Neurological:      General: No focal deficit present.      Mental Status: She is alert and oriented to person, place, and time.   Psychiatric:         Behavior: Behavior normal.         Thought Content: Thought content normal.         Results Review:  I reviewed the patient's new clinical results.  I reviewed the patient's new imaging results and agree with the interpretation.  I reviewed the patient's other test results and agree with the interpretation  I personally viewed and interpreted the patient's EKG/Telemetry data  Discussed with ED provider.    Lab Results (last 24 hours)       Procedure Component Value Units Date/Time    CBC & Differential [[id number]]  (Abnormal) Collected: 02/23/25 2025    Specimen: Blood from Arm, Left Updated: 02/23/25 2036    Narrative:      The following orders were created for panel order CBC & Differential.  Procedure                               Abnormality         Status                     ---------                               -----------         ------                     CBC Auto Differential[[id number]]        Abnormal             Final result                 Please view results for these tests on the individual orders.    Comprehensive Metabolic Panel [[id number]]  (Abnormal) Collected: 02/23/25 2025    Specimen: Blood from Arm, Left Updated: 02/23/25 2100     Glucose 104 mg/dL      BUN 15 mg/dL      Creatinine 1.09 mg/dL      Sodium 135 mmol/L      Potassium 4.1 mmol/L      Chloride 100 mmol/L      CO2 29.0 mmol/L      Calcium 8.0 mg/dL      Total Protein 6.6 g/dL      Albumin 2.2 g/dL      ALT (SGPT) 47 U/L      AST (SGOT) 83 U/L      Alkaline Phosphatase 269 U/L      Total Bilirubin 7.0 mg/dL      Globulin 4.4 gm/dL      A/G Ratio 0.5 g/dL      BUN/Creatinine Ratio 13.8     Anion Gap 6.0 mmol/L      eGFR 55.4 mL/min/1.73     Narrative:      GFR Categories in Chronic Kidney Disease (CKD)      GFR Category          GFR (mL/min/1.73)    Interpretation  G1                     90 or greater         Normal or high (1)  G2                      60-89                Mild decrease (1)  G3a                   45-59                Mild to moderate decrease  G3b                   30-44                Moderate to severe decrease  G4                    15-29                Severe decrease  G5                    14 or less           Kidney failure          (1)In the absence of evidence of kidney disease, neither GFR category G1 or G2 fulfill the criteria for CKD.    eGFR calculation 2021 CKD-EPI creatinine equation, which does not include race as a factor    High Sensitivity Troponin T [[id number]]  (Normal) Collected: 02/23/25 2025    Specimen: Blood from Arm, Left Updated: 02/23/25 2057     HS Troponin T 8 ng/L     Narrative:      High Sensitive Troponin T Reference Range:  <14.0 ng/L- Negative Female for AMI  <22.0 ng/L- Negative Male for AMI  >=14 - Abnormal Female indicating possible myocardial injury.  >=22 - Abnormal Male indicating possible myocardial injury.   Clinicians would have to utilize clinical acumen, EKG, Troponin, and serial changes to  determine if it is an Acute Myocardial Infarction or myocardial injury due to an underlying chronic condition.         CBC Auto Differential [[id number]]  (Abnormal) Collected: 02/23/25 2025    Specimen: Blood from Arm, Left Updated: 02/23/25 2036     WBC 15.54 10*3/mm3      RBC 3.82 10*6/mm3      Hemoglobin 13.2 g/dL      Hematocrit 37.3 %      MCV 97.6 fL      MCH 34.6 pg      MCHC 35.4 g/dL      RDW 14.5 %      RDW-SD 51.3 fl      MPV 10.1 fL      Platelets 165 10*3/mm3      Neutrophil % 81.9 %      Lymphocyte % 8.4 %      Monocyte % 8.3 %      Eosinophil % 0.3 %      Basophil % 0.3 %      Immature Grans % 0.8 %      Neutrophils, Absolute 12.73 10*3/mm3      Lymphocytes, Absolute 1.31 10*3/mm3      Monocytes, Absolute 1.29 10*3/mm3      Eosinophils, Absolute 0.04 10*3/mm3      Basophils, Absolute 0.05 10*3/mm3      Immature Grans, Absolute 0.12 10*3/mm3      nRBC 0.0 /100 WBC     Protime-INR [[id number]]  (Abnormal) Collected: 02/23/25 2025    Specimen: Blood from Arm, Left Updated: 02/23/25 2327     Protime 19.9 Seconds      INR 1.68    aPTT [[id number]]  (Abnormal) Collected: 02/23/25 2025    Specimen: Blood from Arm, Left Updated: 02/23/25 2327     PTT 35.9 seconds     High Sensitivity Troponin T 1Hr [[id number]]  (Normal) Collected: 02/23/25 2133    Specimen: Blood from Arm, Left Updated: 02/23/25 2159     HS Troponin T 8 ng/L      Troponin T Numeric Delta 0 ng/L     Narrative:      High Sensitive Troponin T Reference Range:  <14.0 ng/L- Negative Female for AMI  <22.0 ng/L- Negative Male for AMI  >=14 - Abnormal Female indicating possible myocardial injury.  >=22 - Abnormal Male indicating possible myocardial injury.   Clinicians would have to utilize clinical acumen, EKG, Troponin, and serial changes to determine if it is an Acute Myocardial Infarction or myocardial injury due to an underlying chronic condition.         Procalcitonin [[id number]]  (Abnormal) Collected: 02/23/25 2133    Specimen: Blood from Arm,  "Left Updated: 02/23/25 2349     Procalcitonin 0.48 ng/mL     Narrative:      As a Marker for Sepsis (Non-Neonates):    1. <0.5 ng/mL represents a low risk of severe sepsis and/or septic shock.  2. >2 ng/mL represents a high risk of severe sepsis and/or septic shock.    As a Marker for Lower Respiratory Tract Infections that require antibiotic therapy:    PCT on Admission    Antibiotic Therapy       6-12 Hrs later    >0.5                Strongly Recommended  >0.25 - <0.5        Recommended   0.1 - 0.25          Discouraged              Remeasure/reassess PCT  <0.1                Strongly Discouraged     Remeasure/reassess PCT    As 28 day mortality risk marker: \"Change in Procalcitonin Result\" (>80% or <=80%) if Day 0 (or Day 1) and Day 4 values are available. Refer to http://www.[employer]-pct-calculator.com    Change in PCT <=80%  A decrease of PCT levels below or equal to 80% defines a positive change in PCT test result representing a higher risk for 28-day all-cause mortality of patients diagnosed with severe sepsis for septic shock.    Change in PCT >80%  A decrease of PCT levels of more than 80% defines a negative change in PCT result representing a lower risk for 28-day all-cause mortality of patients diagnosed with severe sepsis or septic shock.       Respiratory Panel PCR w/COVID-19(SARS-CoV-2) [facility] In-House, NP Swab in UTM/VTM, 2 HR TAT - Swab, Nasopharynx [[id number]]  (Normal) Collected: 02/23/25 2330    Specimen: Swab from Nasopharynx Updated: 02/24/25 0044     ADENOVIRUS, PCR Not Detected     Coronavirus 229E Not Detected     Coronavirus HKU1 Not Detected     Coronavirus NL63 Not Detected     Coronavirus OC43 Not Detected     COVID19 Not Detected     Human Metapneumovirus Not Detected     Human Rhinovirus/Enterovirus Not Detected     Influenza A PCR Not Detected     Influenza B PCR Not Detected     Parainfluenza Virus 1 Not Detected     Parainfluenza Virus 2 Not Detected     Parainfluenza " Virus 3 Not Detected     Parainfluenza Virus 4 Not Detected     RSV, PCR Not Detected     Bordetella pertussis pcr Not Detected     Bordetella parapertussis PCR Not Detected     Chlamydophila pneumoniae PCR Not Detected     Mycoplasma pneumo by PCR Not Detected    Narrative:      In the setting of a positive respiratory panel with a viral infection PLUS a negative procalcitonin without other underlying concern for bacterial infection, consider observing off antibiotics or discontinuation of antibiotics and continue supportive care. If the respiratory panel is positive for atypical bacterial infection (Bordetella pertussis, Chlamydophila pneumoniae, or Mycoplasma pneumoniae), consider antibiotic de-escalation to target atypical bacterial infection.    Comprehensive Metabolic Panel [[id number]]  (Abnormal) Collected: 02/24/25 0621    Specimen: Blood Updated: 02/24/25 0700     Glucose 168 mg/dL      BUN 17 mg/dL      Creatinine 0.98 mg/dL      Sodium 130 mmol/L      Potassium 3.1 mmol/L      Chloride 99 mmol/L      CO2 25.5 mmol/L      Calcium 7.4 mg/dL      Total Protein 5.8 g/dL      Albumin 1.8 g/dL      ALT (SGPT) 38 U/L      AST (SGOT) 70 U/L      Alkaline Phosphatase 225 U/L      Total Bilirubin 6.3 mg/dL      Globulin 4.0 gm/dL      A/G Ratio 0.5 g/dL      BUN/Creatinine Ratio 17.3     Anion Gap 5.5 mmol/L      eGFR 63.0 mL/min/1.73     Narrative:      GFR Categories in Chronic Kidney Disease (CKD)      GFR Category          GFR (mL/min/1.73)    Interpretation  G1                     90 or greater         Normal or high (1)  G2                      60-89                Mild decrease (1)  G3a                   45-59                Mild to moderate decrease  G3b                   30-44                Moderate to severe decrease  G4                    15-29                Severe decrease  G5                    14 or less           Kidney failure          (1)In the absence of evidence of kidney disease, neither GFR  category G1 or G2 fulfill the criteria for CKD.    eGFR calculation 2021 CKD-EPI creatinine equation, which does not include race as a factor    CBC (No Diff) [[id number]]  (Abnormal) Collected: 02/24/25 0621    Specimen: Blood Updated: 02/24/25 0700     WBC 11.81 10*3/mm3      RBC 3.35 10*6/mm3      Hemoglobin 11.6 g/dL      Hematocrit 31.4 %      MCV 93.7 fL      MCH 34.6 pg      MCHC 36.9 g/dL      RDW 14.0 %      RDW-SD 47.5 fl      MPV 10.2 fL      Platelets 135 10*3/mm3     Uric Acid [[id number]]  (Abnormal) Collected: 02/24/25 0621    Specimen: Blood Updated: 02/24/25 0802     Uric Acid 6.5 mg/dL     TSH Rfx On Abnormal To Free T4 [[id number]]  (Normal) Collected: 02/24/25 0621    Specimen: Blood Updated: 02/24/25 0806     TSH 3.290 uIU/mL     Blood Culture - Blood, Hand, Right [[id number]] Collected: 02/24/25 0919    Specimen: Blood from Hand, Right Updated: 02/24/25 0926    Blood Culture - Blood, Hand, Left [[id number]] Collected: 02/24/25 0919    Specimen: Blood from Hand, Left Updated: 02/24/25 0926    Sodium, Urine, Random - Urine, Clean Catch [[id number]] Collected: 02/24/25 1045    Specimen: Urine, Clean Catch Updated: 02/24/25 1120     Sodium, Urine <20 mmol/L     Narrative:      Reference intervals for random urine have not been established.  Clinical usage is dependent upon physician's interpretation in combination with other laboratory tests.       Osmolality, Urine - Urine, Clean Catch [[id number]] Collected: 02/24/25 1045    Specimen: Urine, Clean Catch Updated: 02/24/25 1141     Osmolality, Urine 609 mOsm/kg     Narrative:      Osmo Normal Reference Ranges:    Random:  mOsm/kg H2O, depending on fluid intake.  Random: >850 mOsm/kg H20, after 12 hour fluid restriction.    24 Hour: 300-900 mOsm/kg H2O.            Imaging Results (Last 24 Hours)       Procedure Component Value Units Date/Time    CT Angiogram Chest [[id number]] Collected: 02/24/25 0028     Updated: 02/24/25 0041    Narrative:      CTA  CHEST, CT ABDOMEN AND PELVIS     HISTORY: chest pain, radiates to right shoulder blade, +soa     COMPARISON: None     TECHNIQUE: CT angiography was performed of the chest with axial images  as well as coronal and sagittal reformatted MIP images provided  following the administration of IV contrast.  3-D surface rendered  reformats were obtained of the pulmonary arteries and aorta.  CT of the  abdomen and pelvis obtained following administration of IV contrast.  Coronal and sagittal reconstructions were obtained.   Radiation dose  reduction techniques were utilized, including automated exposure  control, and exposure modulation based on body size.     FINDINGS:     Chest CTA: There is a small left pleural effusion with left posterior  medial basilar compressive atelectasis. Both lungs are otherwise  normally aerated. The thoracic aorta is normal in caliber, and there is  no evidence of dissection.  There is no suspicious mediastinal  adenopathy or other mass.  Bolus timing is good and there is no evidence  of pulmonary embolism.     Abdomen: There is cirrhotic liver morphology, but no discrete hepatic  mass is seen and there is no evidence of biliary obstruction. The spleen  is normal in size and the pancreas is unremarkable. The SMV, splenic  vein and main portal vein are normally patent.      Both adrenal glands are normal.  Both kidneys are normal.  The aorta is  normal in caliber.       There is mild to moderate ascites, but there is no loculated fluid or  air collection. There are a few mildly prominent para-aortic and  aortocaval lymph nodes, but there is no suspicious abdominal or  retroperitoneal mass.      There is prominent right colonic wall thickening, particularly in the  lower right colon and cecum. There is lesser wall thickening in the  proximal transverse colon.       Pelvis: There is pelvic ascites, but there is no loculated fluid  collection. There is no pelvic adenopathy or other soft tissue mass.    There is no CT evidence of hernia or bowel obstruction.     There is no acute bony abnormality.       Impression:         1.  Pulmonary arteries are well-opacified, and there is no evidence of  pulmonary embolism.     2.  There is a small left pleural effusion, and left posteromedial  basilar compressive atelectasis.     3.  CT abdomen/pelvis demonstrates right colonic wall thickening most  prominent in the lower right colon and cecum suggesting acute colitis.     4.  There is also cirrhotic liver morphology and there is mild to  moderate ascites. No evidence of renal or bowel or biliary obstruction.                 This report was finalized on 2/24/2025 12:38 AM by Dr. Jake Hernandez M.D on Workstation: [id number]       CT Abdomen Pelvis With Contrast [[id number]] Collected: 02/24/25 0028     Updated: 02/24/25 0041    Narrative:      CTA CHEST, CT ABDOMEN AND PELVIS     HISTORY: chest pain, radiates to right shoulder blade, +soa     COMPARISON: None     TECHNIQUE: CT angiography was performed of the chest with axial images  as well as coronal and sagittal reformatted MIP images provided  following the administration of IV contrast.  3-D surface rendered  reformats were obtained of the pulmonary arteries and aorta.  CT of the  abdomen and pelvis obtained following administration of IV contrast.  Coronal and sagittal reconstructions were obtained.   Radiation dose  reduction techniques were utilized, including automated exposure  control, and exposure modulation based on body size.     FINDINGS:     Chest CTA: There is a small left pleural effusion with left posterior  medial basilar compressive atelectasis. Both lungs are otherwise  normally aerated. The thoracic aorta is normal in caliber, and there is  no evidence of dissection.  There is no suspicious mediastinal  adenopathy or other mass.  Bolus timing is good and there is no evidence  of pulmonary embolism.     Abdomen: There is cirrhotic liver  morphology, but no discrete hepatic  mass is seen and there is no evidence of biliary obstruction. The spleen  is normal in size and the pancreas is unremarkable. The SMV, splenic  vein and main portal vein are normally patent.      Both adrenal glands are normal.  Both kidneys are normal.  The aorta is  normal in caliber.       There is mild to moderate ascites, but there is no loculated fluid or  air collection. There are a few mildly prominent para-aortic and  aortocaval lymph nodes, but there is no suspicious abdominal or  retroperitoneal mass.      There is prominent right colonic wall thickening, particularly in the  lower right colon and cecum. There is lesser wall thickening in the  proximal transverse colon.       Pelvis: There is pelvic ascites, but there is no loculated fluid  collection. There is no pelvic adenopathy or other soft tissue mass.   There is no CT evidence of hernia or bowel obstruction.     There is no acute bony abnormality.       Impression:         1.  Pulmonary arteries are well-opacified, and there is no evidence of  pulmonary embolism.     2.  There is a small left pleural effusion, and left posteromedial  basilar compressive atelectasis.     3.  CT abdomen/pelvis demonstrates right colonic wall thickening most  prominent in the lower right colon and cecum suggesting acute colitis.     4.  There is also cirrhotic liver morphology and there is mild to  moderate ascites. No evidence of renal or bowel or biliary obstruction.                 This report was finalized on 2/24/2025 12:38 AM by Dr. Jake Hernandez M.D on Workstation: [id number]       XR Chest 1 View [[id number]] Collected: 02/23/25 2047     Updated: 02/23/25 2050    Narrative:      CXR ONE VIEW      HISTORY: Chest Pain Triage Protocol     COMPARISON: None     TECHNIQUE: single portable AP       Impression:         The heart size is within normal limits.     The lungs are normally aerated. There is no pleural effusion  or  pneumothorax.           This report was finalized on 2/23/2025 8:47 PM by Dr. Jake Hernandez M.D on Workstation: [id number]                   ECG 12 Lead ED Triage Standing Order; Chest Pain   Preliminary Result   HEART RATE=95  bpm   RR Artncxai=998  ms   NM Ovwqfube=457  ms   P Horizontal Axis=0  deg   P Front Axis=43  deg   QRSD Interval=98  ms   QT Iofdmwek=823  ms   QKwJ=903  ms   QRS Axis=26  deg   T Wave Axis=12  deg   - NORMAL ECG -   Sinus rhythm   Date and Time of Study:2025-02-23 20:13:51           Assessment/Plan     Active Hospital Problems    Diagnosis  POA    **Chest pain [R07.9]  Yes    Prediabetes [R73.03]  Yes    Abnormal CT of the abdomen [R93.5]  Yes    Leukocytosis [D72.829]  Yes    Hyponatremia [E87.1]  Yes    Hypokalemia [E87.6]  Yes    Transaminitis [R74.01]  Yes    Cirrhosis [K74.60]  Yes    Anemia [D64.9]  Yes    Thrombocytopenia [D69.6]  Yes    Benign essential hypertension [I10]  Yes    Hyperlipidemia [E78.5]  Yes      Resolved Hospital Problems   No resolved problems to display.       Ms. Ramirez is a 69 y.o. non-smoker with a history of HTN, HLD, cirrhosis who presents to [facility] ER with complaints of acute chest pain radiating to right shoulder blade for the past week for acute chest pain.      Chest pain  -EKG and cardiac enzymes unremarkable  -Patient denies chest pain at this time      Abnormal CT of the abdomen  -Presence of ascites without distention or pain; however, noted acute colitis  -Plan paracentesis with cultures rule out SBP for now  -Continue broad-spectrum empiric antibiotic for acute colitis  -Continue furosemide 80 mg p.o. daily      Leukocytosis  -RVP negative  -Status post IV cefepime x 1  -BC x 2 pending results  -See plan above      Hyponatremia  -Corrected sodium 131      Hypokalemia  -Replete in progress given appropriate renal function      Transaminitis    Cirrhosis    Anemia    Thrombocytopenia  -LFTs improved with trend  -GI evaluated and signed off  deferring cirrhosis management to establish hepatologist at [facility]  -No overt signs of active bleeding; monitor H&H and platelet     Benign essential hypertension  -BP appropriate; plan to continue home meds same      Hyperlipidemia  -Complicated by cirrhosis  -Low-fat healthy heart / diet discussed      Prediabetes  -A1c 5.7 1/2025    I discussed the patient's findings and my recommendations with patient and nursing staff & Dr. Najera.      VTE Prophylaxis - SCDs.  Code Status - Full code.       HUBERT Urbano  [facility]  02/24/25  15:12 EST

## 2025-02-24 NOTE — SIGNIFICANT NOTE
02/24/25 0941   OTHER   Discipline physical therapist   Therapy Assessment/Plan (PT)   Criteria for Skilled Interventions Met (PT) other (see comments)  ([facility] 24 - should be mobilizing with [facility] staff/indep - will follow peripherally for a day or 2 to determine needs.)   Recommendation   PT - Next Appointment 02/25/25

## 2025-02-24 NOTE — PROGRESS NOTES
[facility] Clinical Pharmacy Services: Piperacillin-Tazobactam Consult    Pt Name: Lacey Ramirez   : 1955    Indication: Intra-Abdominal Infection    Relevant clinical data and objective history reviewed:    Past Medical History:   Diagnosis Date    Hyperlipidemia     Hypertension      Creatinine   Date Value Ref Range Status   2025 0.98 0.57 - 1.00 mg/dL Final   2025 1.09 (H) 0.57 - 1.00 mg/dL Final   2023 0.77 0.55 - 1.02 mg/dL Final   2022 0.9 0.55 - 1.02 mg/dL Final   2021 0.8 0.7 - 1.5 mg/dL Final     BUN   Date Value Ref Range Status   2025 17 8 - 23 mg/dL Final   2023 15 10 - 20 mg/dL Final     Estimated Creatinine Clearance: 70 mL/min (by C-G formula based on SCr of 0.98 mg/dL).    Lab Results   Component Value Date    WBC 11.81 (H) 2025     Temp Readings from Last 3 Encounters:   25 98.9 °F (37.2 °C) (Oral)   18 99.1 °F (37.3 °C) (Tympanic)   17 98.2 °F (36.8 °C) (Tympanic)      Assessment/Plan  Estimated CrCl >20 mL/min at this time; BMI 39 kg/m2  Will start piperacillin-tazobactam 3.375 g IV every 8 hours     Formal consult will be discontinue but pharmacy will follow daily while on piperacillin-tazobactam and adjust as needed. Thank you for this consult.    Zena Kurtz, PharmD, BCPS, [facility]  Clinical Pharmacy Specialist, Emergency Medicine   Phone: [phone]

## 2025-02-25 ENCOUNTER — APPOINTMENT (OUTPATIENT)
Dept: ULTRASOUND IMAGING | Facility: HOSPITAL | Age: 70
End: 2025-02-25
Payer: MEDICARE

## 2025-02-25 LAB
ALBUMIN SERPL-MCNC: 1.8 G/DL (ref 3.5–5.2)
ALBUMIN/GLOB SERPL: 0.5 G/DL
ALP SERPL-CCNC: 211 U/L (ref 39–117)
ALT SERPL W P-5'-P-CCNC: 36 U/L (ref 1–33)
ANION GAP SERPL CALCULATED.3IONS-SCNC: 5 MMOL/L (ref 5–15)
AST SERPL-CCNC: 66 U/L (ref 1–32)
BASOPHILS # BLD AUTO: 0.03 10*3/MM3 (ref 0–0.2)
BASOPHILS NFR BLD AUTO: 0.3 % (ref 0–1.5)
BILIRUB SERPL-MCNC: 4.5 MG/DL (ref 0–1.2)
BILIRUB UR QL STRIP: NEGATIVE
BUN SERPL-MCNC: 16 MG/DL (ref 8–23)
BUN/CREAT SERPL: 16.3 (ref 7–25)
CALCIUM SPEC-SCNC: 7.1 MG/DL (ref 8.6–10.5)
CHLORIDE SERPL-SCNC: 103 MMOL/L (ref 98–107)
CLARITY UR: CLEAR
CO2 SERPL-SCNC: 26 MMOL/L (ref 22–29)
COLOR UR: ABNORMAL
CREAT SERPL-MCNC: 0.98 MG/DL (ref 0.57–1)
DEPRECATED RDW RBC AUTO: 50.5 FL (ref 37–54)
EGFRCR SERPLBLD CKD-EPI 2021: 63 ML/MIN/1.73
EOSINOPHIL # BLD AUTO: 0.19 10*3/MM3 (ref 0–0.4)
EOSINOPHIL NFR BLD AUTO: 2 % (ref 0.3–6.2)
ERYTHROCYTE [DISTWIDTH] IN BLOOD BY AUTOMATED COUNT: 14.4 % (ref 12.3–15.4)
GLOBULIN UR ELPH-MCNC: 3.6 GM/DL
GLUCOSE BLDC GLUCOMTR-MCNC: 104 MG/DL (ref 70–130)
GLUCOSE SERPL-MCNC: 89 MG/DL (ref 65–99)
GLUCOSE UR STRIP-MCNC: NEGATIVE MG/DL
HBA1C MFR BLD: <4.3 % (ref 4.8–5.6)
HCT VFR BLD AUTO: 30 % (ref 34–46.6)
HCT VFR BLD AUTO: 30.2 % (ref 34–46.6)
HGB BLD-MCNC: 10.3 G/DL (ref 12–15.9)
HGB BLD-MCNC: 10.6 G/DL (ref 12–15.9)
HGB UR QL STRIP.AUTO: NEGATIVE
IMM GRANULOCYTES # BLD AUTO: 0.07 10*3/MM3 (ref 0–0.05)
IMM GRANULOCYTES NFR BLD AUTO: 0.7 % (ref 0–0.5)
KETONES UR QL STRIP: NEGATIVE
LEUKOCYTE ESTERASE UR QL STRIP.AUTO: NEGATIVE
LYMPHOCYTES # BLD AUTO: 1.49 10*3/MM3 (ref 0.7–3.1)
LYMPHOCYTES NFR BLD AUTO: 15.5 % (ref 19.6–45.3)
MCH RBC QN AUTO: 33 PG (ref 26.6–33)
MCHC RBC AUTO-ENTMCNC: 34.3 G/DL (ref 31.5–35.7)
MCV RBC AUTO: 96.2 FL (ref 79–97)
MONOCYTES # BLD AUTO: 1.18 10*3/MM3 (ref 0.1–0.9)
MONOCYTES NFR BLD AUTO: 12.2 % (ref 5–12)
NEUTROPHILS NFR BLD AUTO: 6.68 10*3/MM3 (ref 1.7–7)
NEUTROPHILS NFR BLD AUTO: 69.3 % (ref 42.7–76)
NITRITE UR QL STRIP: NEGATIVE
PH UR STRIP.AUTO: 7 [PH] (ref 5–8)
PLATELET # BLD AUTO: 131 10*3/MM3 (ref 140–450)
PMV BLD AUTO: 10.3 FL (ref 6–12)
POTASSIUM SERPL-SCNC: 4.1 MMOL/L (ref 3.5–5.2)
PROT SERPL-MCNC: 5.4 G/DL (ref 6–8.5)
PROT UR QL STRIP: NEGATIVE
RBC # BLD AUTO: 3.12 10*6/MM3 (ref 3.77–5.28)
SODIUM SERPL-SCNC: 134 MMOL/L (ref 136–145)
SP GR UR STRIP: 1.01 (ref 1–1.03)
UROBILINOGEN UR QL STRIP: ABNORMAL
WBC NRBC COR # BLD AUTO: 9.64 10*3/MM3 (ref 3.4–10.8)

## 2025-02-25 PROCEDURE — 76942 ECHO GUIDE FOR BIOPSY: CPT

## 2025-02-25 PROCEDURE — 80053 COMPREHEN METABOLIC PANEL: CPT | Performed by: NURSE PRACTITIONER

## 2025-02-25 PROCEDURE — 82948 REAGENT STRIP/BLOOD GLUCOSE: CPT

## 2025-02-25 PROCEDURE — G0378 HOSPITAL OBSERVATION PER HR: HCPCS

## 2025-02-25 PROCEDURE — 85025 COMPLETE CBC W/AUTO DIFF WBC: CPT | Performed by: NURSE PRACTITIONER

## 2025-02-25 PROCEDURE — 81003 URINALYSIS AUTO W/O SCOPE: CPT | Performed by: NURSE PRACTITIONER

## 2025-02-25 PROCEDURE — 25010000002 PIPERACILLIN SOD-TAZOBACTAM PER 1 G: Performed by: NURSE PRACTITIONER

## 2025-02-25 PROCEDURE — 63710000001 ONDANSETRON ODT 4 MG TABLET DISPERSIBLE: Performed by: NURSE PRACTITIONER

## 2025-02-25 PROCEDURE — 83036 HEMOGLOBIN GLYCOSYLATED A1C: CPT | Performed by: NURSE PRACTITIONER

## 2025-02-25 RX ADMIN — LISINOPRIL 40 MG: 20 TABLET ORAL at 08:10

## 2025-02-25 RX ADMIN — PIPERACILLIN AND TAZOBACTAM 3.38 G: 3; .375 INJECTION, POWDER, FOR SOLUTION INTRAVENOUS at 21:42

## 2025-02-25 RX ADMIN — FUROSEMIDE 80 MG: 40 TABLET ORAL at 08:10

## 2025-02-25 RX ADMIN — PIPERACILLIN AND TAZOBACTAM 3.38 G: 3; .375 INJECTION, POWDER, FOR SOLUTION INTRAVENOUS at 06:00

## 2025-02-25 RX ADMIN — PIPERACILLIN AND TAZOBACTAM 3.38 G: 3; .375 INJECTION, POWDER, FOR SOLUTION INTRAVENOUS at 14:38

## 2025-02-25 RX ADMIN — Medication 1000 MCG: at 08:10

## 2025-02-25 RX ADMIN — Medication 800 MCG: at 08:10

## 2025-02-25 RX ADMIN — ONDANSETRON 4 MG: 4 TABLET, ORALLY DISINTEGRATING ORAL at 11:02

## 2025-02-25 NOTE — PLAN OF CARE
Goal Outcome Evaluation:  Plan of Care Reviewed With: patient, spouse        Progress: no change  Outcome Evaluation: Patient alert and oriented. Denies pain and nausea. Went for paracentesis  today but they were unable to do procedure due to lack of fluid to drain. UA collected, pending stool sample. Continues on IV antibiotics

## 2025-02-25 NOTE — SIGNIFICANT NOTE
02/25/25 5929   OTHER   Discipline physical therapist   Therapy Assessment/Plan (PT)   Criteria for Skilled Interventions Met (PT) no problems identified which require skilled intervention     68 yo admitted with chest pain . Per adult pt profile, pt with no mobility issues PTA. Per adult PCS, pt is up adlib/iindep and with [facility] score of 24. No indication for acute PT. Continue to facilitate HLM per [facility] (250' or more)

## 2025-02-25 NOTE — SIGNIFICANT NOTE
02/25/25 1312   OTHER   Discipline occupational therapist   Rehab Time/Intention   Session Not Performed other (see comments)  (Per pt reporting, patient is up ad jack in her room, is able to take herself to the bathroom and complete ADLs without assistance. Patient with no skilled acute care OT needs identified at this time. OT will sign off. Please reconsult if status changes.)

## 2025-02-25 NOTE — PROGRESS NOTES
Name: Lacey Ramirez ADMIT: 2025   : 1955  PCP: Johanny Hall MD    MRN: [id number] LOS: 0 days   AGE/SEX: 69 y.o. female  ROOM: [facility]     Subjective   Subjective   Patient seen and examined this morning.  Hospital day 2.  She is awake and resting in bed, she is feeling better today compared to prior.  No chest pain at this time, no acute events overnight.       Objective   Objective   Vital Signs  Temp:  [97.9 °F (36.6 °C)] 97.9 °F (36.6 °C)  Heart Rate:  [87-91] 87  Resp:  [16-18] 16  BP: ()/(61-67) 96/61  SpO2:  [98 %-99 %] 99 %  on   ;   Device (Oxygen Therapy): room air  Body mass index is 38.95 kg/m².  Physical Exam  Vitals and nursing note reviewed.   Constitutional:       General: She is not in acute distress.     Appearance: She is overweight.   Eyes:      General: No scleral icterus.     Conjunctiva/sclera: Conjunctivae normal.   Cardiovascular:      Rate and Rhythm: Normal rate.      Pulses: Normal pulses.      Heart sounds: Normal heart sounds.   Pulmonary:      Effort: Pulmonary effort is normal. No respiratory distress.      Breath sounds: Normal breath sounds. No wheezing.   Abdominal:      Palpations: Abdomen is soft.      Tenderness: There is no abdominal tenderness.   Musculoskeletal:      Right lower leg: No edema.      Left lower leg: No edema.   Skin:     General: Skin is warm and dry.   Neurological:      Mental Status: She is alert.       Results Review     I reviewed the patient's new clinical results.  Results from last 7 days   Lab Units 25  0305 25  2355 25  1800 25  0625   WBC 10*3/mm3 9.64  --   --  11.81* 15.54*   HEMOGLOBIN g/dL 10.3* 10.6* 11.1* 11.6* 13.2   PLATELETS 10*3/mm3 131*  --   --  135* 165     Results from last 7 days   Lab Units 25  0305 25  2116 25  0621 25   SODIUM mmol/L 134*  --  130* 135*   POTASSIUM mmol/L 4.1 3.4* 3.1* 4.1   CHLORIDE mmol/L 103  --  99 100   CO2 mmol/L  26.0  --  25.5 29.0   BUN mg/dL 16  --  17 15   CREATININE mg/dL 0.98  --  0.98 1.09*   GLUCOSE mg/dL 89  --  168* 104*   EGFR mL/min/1.73 63.0  --  63.0 55.4*     Results from last 7 days   Lab Units 02/25/25  0305 02/24/25  0621 02/23/25 2025   ALBUMIN g/dL 1.8* 1.8* 2.2*   BILIRUBIN mg/dL 4.5* 6.3* 7.0*   ALK PHOS U/L 211* 225* 269*   AST (SGOT) U/L 66* 70* 83*   ALT (SGPT) U/L 36* 38* 47*     Results from last 7 days   Lab Units 02/25/25  0305 02/24/25 0621 02/23/25 2025   CALCIUM mg/dL 7.1* 7.4* 8.0*   ALBUMIN g/dL 1.8* 1.8* 2.2*     Results from last 7 days   Lab Units 02/23/25 2133   PROCALCITONIN ng/mL 0.48*     Hemoglobin A1C   Date/Time Value Ref Range Status   02/25/2025 0305 <4.30 (L) 4.80 - 5.60 % Final       CT Abdomen Pelvis With Contrast    Result Date: 2/24/2025   1.  Pulmonary arteries are well-opacified, and there is no evidence of pulmonary embolism.  2.  There is a small left pleural effusion, and left posteromedial basilar compressive atelectasis.  3.  CT abdomen/pelvis demonstrates right colonic wall thickening most prominent in the lower right colon and cecum suggesting acute colitis.  4.  There is also cirrhotic liver morphology and there is mild to moderate ascites. No evidence of renal or bowel or biliary obstruction.      This report was finalized on 2/24/2025 12:38 AM by Dr. Jake Hernandez M.D on Workstation: [id number]      CT Angiogram Chest    Result Date: 2/24/2025   1.  Pulmonary arteries are well-opacified, and there is no evidence of pulmonary embolism.  2.  There is a small left pleural effusion, and left posteromedial basilar compressive atelectasis.  3.  CT abdomen/pelvis demonstrates right colonic wall thickening most prominent in the lower right colon and cecum suggesting acute colitis.  4.  There is also cirrhotic liver morphology and there is mild to moderate ascites. No evidence of renal or bowel or biliary obstruction.      This report was finalized on 2/24/2025  12:38 AM by Dr. Jake Hernandez M.D on Workstation: [id number]      XR Chest 1 View    Result Date: 2/23/2025   The heart size is within normal limits.  The lungs are normally aerated. There is no pleural effusion or pneumothorax.    This report was finalized on 2/23/2025 8:47 PM by Dr. Jake Hernandez M.D on Workstation: [id number]       I have personally reviewed all medications:  Scheduled Medications  aspirin, 325 mg, Oral, Once  folic acid, 800 mcg, Oral, Daily  furosemide, 80 mg, Oral, Daily  lisinopril, 40 mg, Oral, Daily  piperacillin-tazobactam, 3.375 g, Intravenous, Q8H  vitamin B-12, 1,000 mcg, Oral, Daily    Infusions   Diet  Diet: Gastrointestinal; Fiber-Restricted; Texture: Soft to Chew (NDD 3); Soft to Chew: Whole Meat; Fluid Consistency: Thin (IDDSI 0)    I have personally reviewed:  [x]  Laboratory   [x]  Microbiology   [x]  Radiology   [x]  EKG/Telemetry  []  Cardiology/Vascular   []  Pathology    []  Records       Assessment/Plan     Active Hospital Problems    Diagnosis  POA    **Chest pain [R07.9]  Yes    Prediabetes [R73.03]  Yes    Abnormal CT of the abdomen [R93.5]  Yes    Leukocytosis [D72.829]  Yes    Hyponatremia [E87.1]  Yes    Hypokalemia [E87.6]  Yes    Transaminitis [R74.01]  Yes    Cirrhosis [K74.60]  Yes    Anemia [D64.9]  Yes    Thrombocytopenia [D69.6]  Yes    Benign essential hypertension [I10]  Yes    Hyperlipidemia [E78.5]  Yes      Resolved Hospital Problems   No resolved problems to display.       69 y.o. female  with history of cirrhosis, HTN, HLD, prediabetes who presents with complaints of chest pain     Chest pain  - Endorsed on arrival, not appearing to be consistent with cardiac etiology. Pain involved diffuse lower chest wall, upper abdominal region, with triggers including eating certain foods. She endorses having reflux symptoms previously, and pain she describes appears to be likely GI related in nature. Troponin negative, no evidence of ischemia. Chest pain  has resolved at present. CT angiogram negative for PE.  - Telemetry monitoring. Will add GI cocktail PRN.    Pleural effusion  Atelectasis  - Noted on imaging, monitor respiratory status, encourage IS use.    Acute colitis  Leukocytosis  - WBC elevated on arrival, coupled with CT imaging showing findings concerning for acute colitis in lower right colon. GI PCR and UA ordered but not done. Blood cultures no growth to date. She is on Zosyn, WBC has improved.  - Continue treatments as ordered.    Cirrhosis  Ascites  Transaminitis  - Cirrhosis noted on imaging, with mild-moderate ascites. GI consulted, no acute intervention warranted. She is scheduled for EGD and Colonoscopy with primary GI team at [place] in April 2025. Attempted paracentesis, but not enough fluid to warrant drainage. LFT remain elevated, but slightly improved.  - Continue medication regimen as prescribed for now. Monitor LFT values with CMP.    Hyponatremia  - Noted on initial labs, urine studies reviewed-consistent with cirrhosis. TSH and uric acid ordered and reviewed. She is on home diuretic, values have improved.  - Continue to monitor. Repeat labs in AM.     Hypokalemia  - Potassium low previously, repleted and improved on repeat testing.    Right lower extremity swelling  - LE Duplex US negative for DVT.    Prediabetes with hyperglycemia  - Prior A1c in 2024 elevated at 5.70%, repeat here <4.30%.    Hypertension  - BP soft, hold Lisinopril for now.    Anemia and thrombocytopenia  - Noted on labs, no evidence of bleeding, monitor closely. Repeat CBC in AM.       SCDs for DVT prophylaxis.  Full code.  Discussed with patient, family, and nursing staff.  Anticipate discharge home in 1-2 days. Possibly tomorrow 02/26/25.  Expected discharge date/ time has not been documented.      Thomas Najera DO  [facility]  02/25/25

## 2025-02-26 LAB
ALBUMIN SERPL-MCNC: 1.8 G/DL (ref 3.5–5.2)
ALBUMIN/GLOB SERPL: 0.5 G/DL
ALP SERPL-CCNC: 212 U/L (ref 39–117)
ALT SERPL W P-5'-P-CCNC: 39 U/L (ref 1–33)
ANION GAP SERPL CALCULATED.3IONS-SCNC: 3.5 MMOL/L (ref 5–15)
AST SERPL-CCNC: 73 U/L (ref 1–32)
BASOPHILS # BLD AUTO: 0.04 10*3/MM3 (ref 0–0.2)
BASOPHILS NFR BLD AUTO: 0.5 % (ref 0–1.5)
BILIRUB SERPL-MCNC: 3.8 MG/DL (ref 0–1.2)
BUN SERPL-MCNC: 15 MG/DL (ref 8–23)
BUN/CREAT SERPL: 16.5 (ref 7–25)
CALCIUM SPEC-SCNC: 7.3 MG/DL (ref 8.6–10.5)
CHLORIDE SERPL-SCNC: 101 MMOL/L (ref 98–107)
CO2 SERPL-SCNC: 27.5 MMOL/L (ref 22–29)
CREAT SERPL-MCNC: 0.91 MG/DL (ref 0.57–1)
DEPRECATED RDW RBC AUTO: 49.8 FL (ref 37–54)
EGFRCR SERPLBLD CKD-EPI 2021: 68.9 ML/MIN/1.73
EOSINOPHIL # BLD AUTO: 0.28 10*3/MM3 (ref 0–0.4)
EOSINOPHIL NFR BLD AUTO: 3.3 % (ref 0.3–6.2)
ERYTHROCYTE [DISTWIDTH] IN BLOOD BY AUTOMATED COUNT: 14.2 % (ref 12.3–15.4)
GLOBULIN UR ELPH-MCNC: 3.9 GM/DL
GLUCOSE SERPL-MCNC: 78 MG/DL (ref 65–99)
HCT VFR BLD AUTO: 30.5 % (ref 34–46.6)
HGB BLD-MCNC: 10.6 G/DL (ref 12–15.9)
IMM GRANULOCYTES # BLD AUTO: 0.07 10*3/MM3 (ref 0–0.05)
IMM GRANULOCYTES NFR BLD AUTO: 0.8 % (ref 0–0.5)
LYMPHOCYTES # BLD AUTO: 1.95 10*3/MM3 (ref 0.7–3.1)
LYMPHOCYTES NFR BLD AUTO: 23.3 % (ref 19.6–45.3)
MCH RBC QN AUTO: 33.5 PG (ref 26.6–33)
MCHC RBC AUTO-ENTMCNC: 34.8 G/DL (ref 31.5–35.7)
MCV RBC AUTO: 96.5 FL (ref 79–97)
MONOCYTES # BLD AUTO: 0.96 10*3/MM3 (ref 0.1–0.9)
MONOCYTES NFR BLD AUTO: 11.5 % (ref 5–12)
NEUTROPHILS NFR BLD AUTO: 5.07 10*3/MM3 (ref 1.7–7)
NEUTROPHILS NFR BLD AUTO: 60.6 % (ref 42.7–76)
PLATELET # BLD AUTO: 143 10*3/MM3 (ref 140–450)
PMV BLD AUTO: 10 FL (ref 6–12)
POTASSIUM SERPL-SCNC: 3.8 MMOL/L (ref 3.5–5.2)
PROT SERPL-MCNC: 5.7 G/DL (ref 6–8.5)
RBC # BLD AUTO: 3.16 10*6/MM3 (ref 3.77–5.28)
SODIUM SERPL-SCNC: 132 MMOL/L (ref 136–145)
WBC NRBC COR # BLD AUTO: 8.37 10*3/MM3 (ref 3.4–10.8)

## 2025-02-26 PROCEDURE — 80053 COMPREHEN METABOLIC PANEL: CPT | Performed by: NURSE PRACTITIONER

## 2025-02-26 PROCEDURE — 85025 COMPLETE CBC W/AUTO DIFF WBC: CPT | Performed by: NURSE PRACTITIONER

## 2025-02-26 PROCEDURE — 36415 COLL VENOUS BLD VENIPUNCTURE: CPT | Performed by: NURSE PRACTITIONER

## 2025-02-26 PROCEDURE — 25010000002 PIPERACILLIN SOD-TAZOBACTAM PER 1 G: Performed by: NURSE PRACTITIONER

## 2025-02-26 RX ORDER — PANTOPRAZOLE SODIUM 40 MG/1
40 TABLET, DELAYED RELEASE ORAL
Status: DISCONTINUED | OUTPATIENT
Start: 2025-02-27 | End: 2025-02-27 | Stop reason: HOSPADM

## 2025-02-26 RX ADMIN — PIPERACILLIN AND TAZOBACTAM 3.38 G: 3; .375 INJECTION, POWDER, FOR SOLUTION INTRAVENOUS at 22:20

## 2025-02-26 RX ADMIN — PIPERACILLIN AND TAZOBACTAM 3.38 G: 3; .375 INJECTION, POWDER, FOR SOLUTION INTRAVENOUS at 06:40

## 2025-02-26 RX ADMIN — Medication 800 MCG: at 08:10

## 2025-02-26 RX ADMIN — PIPERACILLIN AND TAZOBACTAM 3.38 G: 3; .375 INJECTION, POWDER, FOR SOLUTION INTRAVENOUS at 13:53

## 2025-02-26 RX ADMIN — FUROSEMIDE 80 MG: 40 TABLET ORAL at 08:10

## 2025-02-26 RX ADMIN — Medication 1000 MCG: at 08:10

## 2025-02-26 NOTE — CASE MANAGEMENT/SOCIAL WORK
Discharge Planning Assessment  [facility]     Patient Name: Lacey Ramirez  MRN: [id number]  Today's Date: 2/26/2025    Admit Date: 2/23/2025    Plan: Home, family to transport.   Discharge Needs Assessment       Row Name 02/26/25 1531       Living Environment    People in Home spouse    Current Living Arrangements home    Family Caregiver if Needed spouse;child(vicki), adult    Quality of Family Relationships helpful;involved;supportive    Able to Return to Prior Arrangements yes       Transition Planning    Patient/Family Anticipates Transition to home    Patient/Family Anticipated Services at Transition     Transportation Anticipated car, drives self;family or friend will provide       Discharge Needs Assessment    Readmission Within the Last 30 Days no previous admission in last 30 days    Equipment Currently Used at Home none    Concerns to be Addressed no discharge needs identified;denies needs/concerns at this time                   Discharge Plan       Row Name 02/26/25 1531       Plan    Plan Home, family to transport.    Patient/Family in Agreement with Plan yes    Plan Comments CCP met with pt at the bedside, introduced self and role of CCP. Face sheet information and pharmacy verified. Pt lives in a tri-level house with 2STE with her spouse. Pt still drives, IADL's and denies DME at home. Pt has a living will. Pt is enrolled in meds to beds and denies trouble affording or managing her medications. Pt denies HH or SNF history. DC plan is to return home, family to transport. Gerardo RN/CCP                  Continued Care and Services - Admitted Since 2/23/2025    No active coordination exists for this encounter.       Expected Discharge Date and Time       Expected Discharge Date Expected Discharge Time    Feb 27, 2025            Demographic Summary       Row Name 02/26/25 1532       General Information    Admission Type inpatient    Arrived From home    Required Notices Provided Important  Message from Medicare    Referral Source admission list;case finding    Reason for Consult discharge planning    Preferred Language English       Contact Information    Permission Granted to Share Info With ;family/designee                   Functional Status       Row Name 02/26/25 1531       Functional Status    Usual Activity Tolerance excellent    Current Activity Tolerance good       Assessment of Health Literacy    How often do you have someone help you read hospital materials? Never    How often do you have problems learning about your medical condition because of difficulty understanding written information? Never    How often do you have a problem understanding what is told to you about your medical condition? Never    How confident are you filling out medical forms by yourself? Extremely    Health Literacy Excellent       Functional Status, IADL    Medications independent    Meal Preparation independent    Housekeeping independent    Laundry independent    Shopping independent                               Hailey Mina RN

## 2025-02-26 NOTE — PROGRESS NOTES
Name: Lacey Ramirez ADMIT: 2025   : 1955  PCP: Johanny Hall MD    MRN: [id number] LOS: 0 days   AGE/SEX: 69 y.o. female  ROOM: [facility]     Subjective   Subjective   Patient seen and examined this morning.  Hospital day 3, sitting up in chair, resting comfortably. Doing okay today, feeling slightly better overall. No chest pain at present.       Objective   Objective   Vital Signs  Temp:  [97.3 °F (36.3 °C)-97.9 °F (36.6 °C)] 97.9 °F (36.6 °C)  Heart Rate:  [87-89] 87  Resp:  [18] 18  BP: (121-123)/(55-63) 123/63  SpO2:  [99 %] 99 %  on   ;   Device (Oxygen Therapy): room air  Body mass index is 38.95 kg/m².  Physical Exam  Vitals and nursing note reviewed.   Constitutional:       General: She is not in acute distress.     Appearance: She is overweight.   Eyes:      General: No scleral icterus.     Conjunctiva/sclera: Conjunctivae normal.   Cardiovascular:      Rate and Rhythm: Normal rate.      Pulses: Normal pulses.      Heart sounds: Normal heart sounds.   Pulmonary:      Effort: Pulmonary effort is normal. No respiratory distress.      Breath sounds: Normal breath sounds. No wheezing.   Abdominal:      Palpations: Abdomen is soft.      Tenderness: There is no abdominal tenderness. There is no guarding.   Musculoskeletal:      Right lower leg: No edema.      Left lower leg: No edema.      Comments: Trace lower extremity swelling   Skin:     General: Skin is warm and dry.   Neurological:      Mental Status: She is alert.       Results Review     I reviewed the patient's new clinical results.  Results from last 7 days   Lab Units 25  0402 25  0305 25  2355 25  1800 25  0621 25   WBC 10*3/mm3 8.37 9.64  --   --  11.81* 15.54*   HEMOGLOBIN g/dL 10.6* 10.3* 10.6* 11.1* 11.6* 13.2   PLATELETS 10*3/mm3 143 131*  --   --  135* 165     Results from last 7 days   Lab Units 25  0402 25  0305 25  2116 25  0621 25   SODIUM  mmol/L 132* 134*  --  130* 135*   POTASSIUM mmol/L 3.8 4.1 3.4* 3.1* 4.1   CHLORIDE mmol/L 101 103  --  99 100   CO2 mmol/L 27.5 26.0  --  25.5 29.0   BUN mg/dL 15 16  --  17 15   CREATININE mg/dL 0.91 0.98  --  0.98 1.09*   GLUCOSE mg/dL 78 89  --  168* 104*   EGFR mL/min/1.73 68.9 63.0  --  63.0 55.4*     Results from last 7 days   Lab Units 02/26/25  0402 02/25/25  0305 02/24/25  0621 02/23/25 2025   ALBUMIN g/dL 1.8* 1.8* 1.8* 2.2*   BILIRUBIN mg/dL 3.8* 4.5* 6.3* 7.0*   ALK PHOS U/L 212* 211* 225* 269*   AST (SGOT) U/L 73* 66* 70* 83*   ALT (SGPT) U/L 39* 36* 38* 47*     Results from last 7 days   Lab Units 02/26/25  0402 02/25/25  0305 02/24/25  0621 02/23/25 2025   CALCIUM mg/dL 7.3* 7.1* 7.4* 8.0*   ALBUMIN g/dL 1.8* 1.8* 1.8* 2.2*     Results from last 7 days   Lab Units 02/23/25  2133   PROCALCITONIN ng/mL 0.48*     Hemoglobin A1C   Date/Time Value Ref Range Status   02/25/2025 0305 <4.30 (L) 4.80 - 5.60 % Final     Glucose   Date/Time Value Ref Range Status   02/25/2025 1634 104 70 - 130 mg/dL Final       US Paracentesis    Result Date: 2/26/2025  No significant ascites seen. No paracentesis was performed.   Procedure performed by HUBERT Torres. By electronically signing this report, I, the supervising radiologist, attest that I was not present for the procedure(s) but agree with the final edited report.  This report was finalized on 2/26/2025 9:31 AM by Dr. Tej Lamb M.D on Workstation: [id number]       I have personally reviewed all medications:  Scheduled Medications  aspirin, 325 mg, Oral, Once  folic acid, 800 mcg, Oral, Daily  furosemide, 80 mg, Oral, Daily  [Held by provider] lisinopril, 40 mg, Oral, Daily  piperacillin-tazobactam, 3.375 g, Intravenous, Q8H  vitamin B-12, 1,000 mcg, Oral, Daily    Infusions   Diet  Diet: Gastrointestinal; Fiber-Restricted; Texture: Soft to Chew (NDD 3); Soft to Chew: Whole Meat; Fluid Consistency: Thin (IDDSI 0)    I have personally reviewed:  [x]   Laboratory   [x]  Microbiology   []  Radiology   [x]  EKG/Telemetry  []  Cardiology/Vascular   []  Pathology    []  Records       Assessment/Plan     Active Hospital Problems    Diagnosis  POA    **Chest pain [R07.9]  Yes    Prediabetes [R73.03]  Yes    Abnormal CT of the abdomen [R93.5]  Yes    Leukocytosis [D72.829]  Yes    Hyponatremia [E87.1]  Yes    Hypokalemia [E87.6]  Yes    Transaminitis [R74.01]  Yes    Cirrhosis [K74.60]  Yes    Anemia [D64.9]  Yes    Thrombocytopenia [D69.6]  Yes    Benign essential hypertension [I10]  Yes    Hyperlipidemia [E78.5]  Yes      Resolved Hospital Problems   No resolved problems to display.       69 y.o. female  with history of cirrhosis, HTN, HLD, prediabetes who presents with complaints of chest pain     Chest pain  - Endorsed on arrival, not appearing to be consistent with cardiac etiology. Pain involved diffuse lower chest wall, upper abdominal region, with triggers including eating certain foods. She endorses having reflux symptoms previously, and pain she describes appears to be likely GI related in nature. Troponin negative, no evidence of ischemia. Chest pain has resolved at present. CT angiogram negative for PE.  - Telemetry monitoring. Add PPI and monitor for improvement.    Pleural effusion  Atelectasis  - Noted on imaging, monitor respiratory status, encourage IS use.    Acute colitis  Leukocytosis  - WBC elevated on arrival, coupled with CT imaging showing findings concerning for acute colitis in lower right colon. GI PCR ordered but not done. Blood cultures no growth to date. She is on Zosyn, WBC has improved.  - Continue treatments as ordered.    Cirrhosis  Ascites  Transaminitis  - Cirrhosis noted on imaging, with mild-moderate ascites. GI consulted, no acute intervention warranted. She is scheduled for EGD and Colonoscopy with primary GI team at [place] in April 2025. Attempted paracentesis, but not enough fluid to warrant drainage. LFT remain elevated, not  significantly changed from prior, suspect due to cirrhosis. Abdominal exam overall benign.  - Continue medication regimen as prescribed for now. Monitor LFT values with CMP.    Hyponatremia  - Noted on, urine studies reviewed-consistent with cirrhosis. TSH and uric acid ordered and reviewed. She is on home diuretic, values slightly worse today, add 1500 cc fluid restriction.  - Continue to monitor. Repeat labs in AM.     Hypokalemia  - Potassium low previously, repleted and improved on repeat testing.    Right lower extremity swelling  - LE Duplex US negative for DVT.    Prediabetes with hyperglycemia  - Prior A1c in 2024 elevated at 5.70%, repeat here <4.30%.    Hypertension  - BP soft, holding Lisinopril for now.    Anemia and thrombocytopenia  - Noted on labs, no evidence of bleeding, monitor closely. Repeat CBC in AM.       SCDs for DVT prophylaxis.  Full code.  Discussed with patient, family, and nursing staff.  Anticipate discharge home in 1-2 days. Possibly tomorrow 02/27/25.  Expected Discharge Date: 2/27/2025; Expected Discharge Time:       Thomas Najera DO  [facility]  02/26/25

## 2025-02-26 NOTE — PLAN OF CARE
Problem: Adult Inpatient Plan of Care  Goal: Plan of Care Review  Outcome: Progressing  Goal: Absence of Hospital-Acquired Illness or Injury  Intervention: Identify and Manage Fall Risk  Recent Flowsheet Documentation  Taken 2/26/2025 1403 by Sanjuanita Solitario, RN  Safety Promotion/Fall Prevention:   nonskid shoes/slippers when out of bed   safety round/check completed  Taken 2/26/2025 1200 by Sanjuanita Solitario, RN  Safety Promotion/Fall Prevention:   safety round/check completed   fall prevention program maintained  Taken 2/26/2025 1000 by Sanjuanita Solitario, RN  Safety Promotion/Fall Prevention:   safety round/check completed   fall prevention program maintained  Taken 2/26/2025 0811 by Sanjuanita Solitario, RN  Safety Promotion/Fall Prevention:   nonskid shoes/slippers when out of bed   safety round/check completed   Goal Outcome Evaluation:

## 2025-02-27 ENCOUNTER — READMISSION MANAGEMENT (OUTPATIENT)
Dept: CALL CENTER | Facility: HOSPITAL | Age: 70
End: 2025-02-27
Payer: MEDICARE

## 2025-02-27 VITALS
TEMPERATURE: 98.1 F | BODY MASS INDEX: 38.45 KG/M2 | WEIGHT: 245 LBS | DIASTOLIC BLOOD PRESSURE: 65 MMHG | SYSTOLIC BLOOD PRESSURE: 125 MMHG | RESPIRATION RATE: 18 BRPM | HEIGHT: 67 IN | HEART RATE: 86 BPM | OXYGEN SATURATION: 98 %

## 2025-02-27 LAB
ALBUMIN SERPL-MCNC: 1.2 G/DL (ref 3.5–5.2)
ALBUMIN/GLOB SERPL: 0.3 G/DL
ALP SERPL-CCNC: 212 U/L (ref 39–117)
ALT SERPL W P-5'-P-CCNC: 37 U/L (ref 1–33)
ANION GAP SERPL CALCULATED.3IONS-SCNC: 3.6 MMOL/L (ref 5–15)
AST SERPL-CCNC: 75 U/L (ref 1–32)
BASOPHILS # BLD AUTO: 0.06 10*3/MM3 (ref 0–0.2)
BASOPHILS NFR BLD AUTO: 0.8 % (ref 0–1.5)
BILIRUB SERPL-MCNC: 3.4 MG/DL (ref 0–1.2)
BUN SERPL-MCNC: 13 MG/DL (ref 8–23)
BUN/CREAT SERPL: 14.6 (ref 7–25)
CALCIUM SPEC-SCNC: 7.5 MG/DL (ref 8.6–10.5)
CHLORIDE SERPL-SCNC: 106 MMOL/L (ref 98–107)
CO2 SERPL-SCNC: 26.4 MMOL/L (ref 22–29)
CREAT SERPL-MCNC: 0.89 MG/DL (ref 0.57–1)
DEPRECATED RDW RBC AUTO: 49.1 FL (ref 37–54)
EGFRCR SERPLBLD CKD-EPI 2021: 70.7 ML/MIN/1.73
EOSINOPHIL # BLD AUTO: 0.24 10*3/MM3 (ref 0–0.4)
EOSINOPHIL NFR BLD AUTO: 3.1 % (ref 0.3–6.2)
ERYTHROCYTE [DISTWIDTH] IN BLOOD BY AUTOMATED COUNT: 14.1 % (ref 12.3–15.4)
GLOBULIN UR ELPH-MCNC: 4.7 GM/DL
GLUCOSE SERPL-MCNC: 87 MG/DL (ref 65–99)
HCT VFR BLD AUTO: 30.5 % (ref 34–46.6)
HGB BLD-MCNC: 10.6 G/DL (ref 12–15.9)
IMM GRANULOCYTES # BLD AUTO: 0.05 10*3/MM3 (ref 0–0.05)
IMM GRANULOCYTES NFR BLD AUTO: 0.6 % (ref 0–0.5)
LYMPHOCYTES # BLD AUTO: 1.57 10*3/MM3 (ref 0.7–3.1)
LYMPHOCYTES NFR BLD AUTO: 20.2 % (ref 19.6–45.3)
MCH RBC QN AUTO: 33.3 PG (ref 26.6–33)
MCHC RBC AUTO-ENTMCNC: 34.8 G/DL (ref 31.5–35.7)
MCV RBC AUTO: 95.9 FL (ref 79–97)
MONOCYTES # BLD AUTO: 0.88 10*3/MM3 (ref 0.1–0.9)
MONOCYTES NFR BLD AUTO: 11.3 % (ref 5–12)
NEUTROPHILS NFR BLD AUTO: 4.97 10*3/MM3 (ref 1.7–7)
NEUTROPHILS NFR BLD AUTO: 64 % (ref 42.7–76)
PLATELET # BLD AUTO: 137 10*3/MM3 (ref 140–450)
PMV BLD AUTO: 10 FL (ref 6–12)
POTASSIUM SERPL-SCNC: 3.7 MMOL/L (ref 3.5–5.2)
PROT SERPL-MCNC: 5.9 G/DL (ref 6–8.5)
RBC # BLD AUTO: 3.18 10*6/MM3 (ref 3.77–5.28)
SODIUM SERPL-SCNC: 136 MMOL/L (ref 136–145)
WBC NRBC COR # BLD AUTO: 7.77 10*3/MM3 (ref 3.4–10.8)

## 2025-02-27 PROCEDURE — 85025 COMPLETE CBC W/AUTO DIFF WBC: CPT | Performed by: NURSE PRACTITIONER

## 2025-02-27 PROCEDURE — 25010000002 PIPERACILLIN SOD-TAZOBACTAM PER 1 G: Performed by: NURSE PRACTITIONER

## 2025-02-27 PROCEDURE — 36415 COLL VENOUS BLD VENIPUNCTURE: CPT | Performed by: NURSE PRACTITIONER

## 2025-02-27 PROCEDURE — 80053 COMPREHEN METABOLIC PANEL: CPT | Performed by: NURSE PRACTITIONER

## 2025-02-27 RX ORDER — PANTOPRAZOLE SODIUM 40 MG/1
40 TABLET, DELAYED RELEASE ORAL
Qty: 30 TABLET | Refills: 0 | Status: SHIPPED | OUTPATIENT
Start: 2025-02-28

## 2025-02-27 RX ORDER — LEVOFLOXACIN 750 MG/1
750 TABLET, FILM COATED ORAL DAILY
Qty: 2 TABLET | Refills: 0 | Status: SHIPPED | OUTPATIENT
Start: 2025-02-27 | End: 2025-03-01

## 2025-02-27 RX ORDER — METRONIDAZOLE 500 MG/1
500 TABLET ORAL 3 TIMES DAILY
Qty: 6 TABLET | Refills: 0 | Status: SHIPPED | OUTPATIENT
Start: 2025-02-27 | End: 2025-03-01

## 2025-02-27 RX ORDER — LANOLIN ALCOHOL/MO/W.PET/CERES
1000 CREAM (GRAM) TOPICAL DAILY
Start: 2025-02-27

## 2025-02-27 RX ORDER — MULTIVIT WITH MINERALS/LUTEIN
400 TABLET ORAL DAILY
Start: 2025-02-27

## 2025-02-27 RX ADMIN — Medication 1000 MCG: at 08:12

## 2025-02-27 RX ADMIN — PIPERACILLIN AND TAZOBACTAM 3.38 G: 3; .375 INJECTION, POWDER, FOR SOLUTION INTRAVENOUS at 05:39

## 2025-02-27 RX ADMIN — PANTOPRAZOLE SODIUM 40 MG: 40 TABLET, DELAYED RELEASE ORAL at 05:40

## 2025-02-27 RX ADMIN — FUROSEMIDE 80 MG: 40 TABLET ORAL at 08:12

## 2025-02-27 RX ADMIN — Medication 800 MCG: at 08:12

## 2025-02-27 NOTE — PLAN OF CARE
Goal Outcome Evaluation:           Progress: no change  Outcome Evaluation: pt alert and oriented. vss, no c/o pain. iv abx given. call light within reach. safety precautions maintained.

## 2025-02-27 NOTE — CASE MANAGEMENT/SOCIAL WORK
Case Management Discharge Note      Final Note: Home, no additional CCP needs.         Selected Continued Care - Admitted Since 2/23/2025       Destination    No services have been selected for the patient.                Durable Medical Equipment    No services have been selected for the patient.                Dialysis/Infusion    No services have been selected for the patient.                Home Medical Care    No services have been selected for the patient.                Therapy    No services have been selected for the patient.                Community Resources    No services have been selected for the patient.                Community & DME    No services have been selected for the patient.                         Final Discharge Disposition Code: 01 - home or self-care

## 2025-02-27 NOTE — DISCHARGE SUMMARY
Patient Name: Lacey Ramirez  : 1955  MRN: [id number]    Date of Admission: 2025  Date of Discharge:  2025  Primary Care Physician: Johanny Hall MD      Chief Complaint:   Chest Pain and Shortness of Breath      Discharge Diagnoses     Active Hospital Problems    Diagnosis  POA    **Chest pain [R07.9]  Yes    Prediabetes [R73.03]  Yes    Abnormal CT of the abdomen [R93.5]  Yes    Leukocytosis [D72.829]  Yes    Hyponatremia [E87.1]  Yes    Hypokalemia [E87.6]  Yes    Transaminitis [R74.01]  Yes    Cirrhosis [K74.60]  Yes    Anemia [D64.9]  Yes    Thrombocytopenia [D69.6]  Yes    Benign essential hypertension [I10]  Yes    Hyperlipidemia [E78.5]  Yes      Resolved Hospital Problems   No resolved problems to display.        Hospital Course     Ms. Ramirez is a 69 y.o. female who presented to [facility] initially complaining of chest discomfort.  Please see the admitting history and physical for further details.  She  was admitted to the hospital for further evaluation and treatment.     Non-cardiac chest pain  - Endorsed on arrival, not appearing to be consistent with cardiac etiology. Pain involved diffuse lower chest wall, upper abdominal region, with triggers including eating certain foods. She endorses having reflux symptoms, pain appeared to be GI in nature. Troponin negative, CT angiogram negative for PE. PPI ordered and led to improvement in symptoms, continue PPI after discharge. Recommend close follow up with PCP within 1 week after discharge for reassessment to guide ongoing management decisions.     Acute colitis  Leukocytosis  - WBC elevated on arrival, coupled with CT imaging showing findings concerning for acute colitis in lower right colon. Blood cultures no growth, GI PCR was ordered, but was unable to obtain. She was treated with Zosyn, responded well, WBC improved. Transition to PO antibiotics with Levaquin and Flagyl to be taken as prescribed to complete  antibiotic course.    Cirrhosis  Ascites  Transaminitis  - Cirrhosis noted on imaging, with mild-moderate ascites. GI consulted, no acute intervention warranted. She is scheduled for EGD and Colonoscopy with primary GI team at [place] in April 2025 and they recommended that she keep this as scheduled and follow up with her primary GI physician. Attempted paracentesis, but not enough fluid to warrant drainage. LFT remain elevated, not significantly changed from prior, suspected due to cirrhosis. Discussed lab values with patient and encouraged her to closely follow up with her PCP for repeat CMP to assess LFT values, she has appointment next Friday on 03/07/25 for repeat labs.     Hyponatremia  - Noted on, urine studies reviewed-consistent with cirrhosis. TSH and uric acid ordered and reviewed. She was placed on fluid restriction, continued on diuretic, values improved. Repeat level check with PCP within 1 week.    Hypertension  - Blood pressure appears stable and acceptable acutely at this time. Recommend that patient check blood pressure 2-3 times daily and record those values in log book and take it to next PCP visit to allow for greater insight into treatment efficacy to guide further management decisions. Recommend heart healthy/low sodium diet.     Hypokalemia  - Potassium low previously, repleted and improved on repeat testing. Repeat level check with PCP.     Right lower extremity swelling  - LE Duplex US negative for DVT.     Prediabetes with hyperglycemia  - Prior A1c in 2024 elevated at 5.70%, repeat here <4.30%.      Anemia and thrombocytopenia  - Noted on labs, no evidence of bleeding, monitor closely. Recommend repeat CBC with PCP within 1 week for reassessment.    Pleural effusion  Atelectasis  - Noted on imaging, respiratory status stable, patient on room air.    Day of Discharge     Subjective:  Hospital day 4, patient awake and resting in bed, feeling much better overall, no new acute  complaints.    Physical Exam:  Temp:  [97.9 °F (36.6 °C)-98.1 °F (36.7 °C)] 98.1 °F (36.7 °C)  Heart Rate:  [86-90] 86  Resp:  [18] 18  BP: (125)/(61-84) 125/65  Body mass index is 38.95 kg/m².  Physical Exam  Vitals and nursing note reviewed.   Constitutional:       General: She is not in acute distress.     Appearance: She is overweight.   Eyes:      General: No scleral icterus.     Conjunctiva/sclera: Conjunctivae normal.   Cardiovascular:      Rate and Rhythm: Normal rate.      Pulses: Normal pulses.      Heart sounds: Normal heart sounds.   Pulmonary:      Effort: Pulmonary effort is normal. No respiratory distress.      Breath sounds: No wheezing.   Abdominal:      Palpations: Abdomen is soft.      Tenderness: There is no abdominal tenderness. There is no guarding.   Skin:     General: Skin is warm and dry.   Neurological:      Mental Status: She is alert. Mental status is at baseline.         Consultants     Consult Orders (all) (From admission, onward)       Start     Ordered    02/24/25 1053  Inpatient Gastroenterology Consult  Once        Specialty:  Gastroenterology  Provider:  Ollie Hurd MD    02/24/25 1057    02/24/25 0050  [facility] (on-call MD unless specified) Details  Once        Specialty:  Hospitalist  Provider:  (Not yet assigned)    02/24/25 0049                  Procedures     * Surgery not found *    Imaging Results (All)       Procedure Component Value Units Date/Time    US Paracentesis [[id number]] Collected: 02/25/25 1433    Specimen: Body Fluid Updated: 02/26/25 0937    Narrative:      ULTRASOUND-GUIDED PARACENTESIS, 2/25/2025 11:11 AM     HISTORY: 69-year-old female with cirrhosis of the liver and abdominal  ascites.     DATE: August 13, 2014     FINDINGS:  Imaging of all 4 quadrants, including the hepatorenal fossa  in the supine position, shows only a trace amount of ascites.       Impression:      No significant ascites seen. No paracentesis was performed.        Procedure performed by  HUBERT Torres.  By electronically signing this report, I, the supervising radiologist,  attest that I was not present for the procedure(s) but agree with the  final edited report.     This report was finalized on 2/26/2025 9:31 AM by Dr. Tej Lamb M.D on Workstation: [id number]       CT Angiogram Chest [[id number]] Collected: 02/24/25 0028     Updated: 02/24/25 0041    Narrative:      CTA CHEST, CT ABDOMEN AND PELVIS     HISTORY: chest pain, radiates to right shoulder blade, +soa     COMPARISON: None     TECHNIQUE: CT angiography was performed of the chest with axial images  as well as coronal and sagittal reformatted MIP images provided  following the administration of IV contrast.  3-D surface rendered  reformats were obtained of the pulmonary arteries and aorta.  CT of the  abdomen and pelvis obtained following administration of IV contrast.  Coronal and sagittal reconstructions were obtained.   Radiation dose  reduction techniques were utilized, including automated exposure  control, and exposure modulation based on body size.     FINDINGS:     Chest CTA: There is a small left pleural effusion with left posterior  medial basilar compressive atelectasis. Both lungs are otherwise  normally aerated. The thoracic aorta is normal in caliber, and there is  no evidence of dissection.  There is no suspicious mediastinal  adenopathy or other mass.  Bolus timing is good and there is no evidence  of pulmonary embolism.     Abdomen: There is cirrhotic liver morphology, but no discrete hepatic  mass is seen and there is no evidence of biliary obstruction. The spleen  is normal in size and the pancreas is unremarkable. The SMV, splenic  vein and main portal vein are normally patent.      Both adrenal glands are normal.  Both kidneys are normal.  The aorta is  normal in caliber.       There is mild to moderate ascites, but there is no loculated fluid or  air collection. There are a few mildly prominent para-aortic  and  aortocaval lymph nodes, but there is no suspicious abdominal or  retroperitoneal mass.      There is prominent right colonic wall thickening, particularly in the  lower right colon and cecum. There is lesser wall thickening in the  proximal transverse colon.       Pelvis: There is pelvic ascites, but there is no loculated fluid  collection. There is no pelvic adenopathy or other soft tissue mass.   There is no CT evidence of hernia or bowel obstruction.     There is no acute bony abnormality.       Impression:         1.  Pulmonary arteries are well-opacified, and there is no evidence of  pulmonary embolism.     2.  There is a small left pleural effusion, and left posteromedial  basilar compressive atelectasis.     3.  CT abdomen/pelvis demonstrates right colonic wall thickening most  prominent in the lower right colon and cecum suggesting acute colitis.     4.  There is also cirrhotic liver morphology and there is mild to  moderate ascites. No evidence of renal or bowel or biliary obstruction.                 This report was finalized on 2/24/2025 12:38 AM by Dr. Jake Hernandez M.D on Workstation: [id number]       CT Abdomen Pelvis With Contrast [[id number]] Collected: 02/24/25 0028     Updated: 02/24/25 0041    Narrative:      CTA CHEST, CT ABDOMEN AND PELVIS     HISTORY: chest pain, radiates to right shoulder blade, +soa     COMPARISON: None     TECHNIQUE: CT angiography was performed of the chest with axial images  as well as coronal and sagittal reformatted MIP images provided  following the administration of IV contrast.  3-D surface rendered  reformats were obtained of the pulmonary arteries and aorta.  CT of the  abdomen and pelvis obtained following administration of IV contrast.  Coronal and sagittal reconstructions were obtained.   Radiation dose  reduction techniques were utilized, including automated exposure  control, and exposure modulation based on body size.     FINDINGS:     Chest CTA:  There is a small left pleural effusion with left posterior  medial basilar compressive atelectasis. Both lungs are otherwise  normally aerated. The thoracic aorta is normal in caliber, and there is  no evidence of dissection.  There is no suspicious mediastinal  adenopathy or other mass.  Bolus timing is good and there is no evidence  of pulmonary embolism.     Abdomen: There is cirrhotic liver morphology, but no discrete hepatic  mass is seen and there is no evidence of biliary obstruction. The spleen  is normal in size and the pancreas is unremarkable. The SMV, splenic  vein and main portal vein are normally patent.      Both adrenal glands are normal.  Both kidneys are normal.  The aorta is  normal in caliber.       There is mild to moderate ascites, but there is no loculated fluid or  air collection. There are a few mildly prominent para-aortic and  aortocaval lymph nodes, but there is no suspicious abdominal or  retroperitoneal mass.      There is prominent right colonic wall thickening, particularly in the  lower right colon and cecum. There is lesser wall thickening in the  proximal transverse colon.       Pelvis: There is pelvic ascites, but there is no loculated fluid  collection. There is no pelvic adenopathy or other soft tissue mass.   There is no CT evidence of hernia or bowel obstruction.     There is no acute bony abnormality.       Impression:         1.  Pulmonary arteries are well-opacified, and there is no evidence of  pulmonary embolism.     2.  There is a small left pleural effusion, and left posteromedial  basilar compressive atelectasis.     3.  CT abdomen/pelvis demonstrates right colonic wall thickening most  prominent in the lower right colon and cecum suggesting acute colitis.     4.  There is also cirrhotic liver morphology and there is mild to  moderate ascites. No evidence of renal or bowel or biliary obstruction.                 This report was finalized on 2/24/2025 12:38 AM by   Jake Hernandez M.D on Workstation: [id number]       XR Chest 1 View [[id number]] Collected: 02/23/25 2047     Updated: 02/23/25 2050    Narrative:      CXR ONE VIEW      HISTORY: Chest Pain Triage Protocol     COMPARISON: None     TECHNIQUE: single portable AP       Impression:         The heart size is within normal limits.     The lungs are normally aerated. There is no pleural effusion or  pneumothorax.           This report was finalized on 2/23/2025 8:47 PM by Dr. Jake Hernandez M.D on Workstation: [id number]             Results for orders placed during the hospital encounter of 02/23/25    Duplex Venous Lower Extremity - Bilateral CAR    Interpretation Summary    Normal bilateral lower extremity venous duplex scan.      Pertinent Labs     Results from last 7 days   Lab Units 02/27/25 0432 02/26/25 0402 02/25/25 0305 02/24/25 2355 02/24/25  1800 02/24/25  0621   WBC 10*3/mm3 7.77 8.37 9.64  --   --  11.81*   HEMOGLOBIN g/dL 10.6* 10.6* 10.3* 10.6*   < > 11.6*   PLATELETS 10*3/mm3 137* 143 131*  --   --  135*    < > = values in this interval not displayed.     Results from last 7 days   Lab Units 02/27/25 0432 02/26/25 0402 02/25/25 0305 02/24/25 2116 02/24/25  0621   SODIUM mmol/L 136 132* 134*  --  130*   POTASSIUM mmol/L 3.7 3.8 4.1 3.4* 3.1*   CHLORIDE mmol/L 106 101 103  --  99   CO2 mmol/L 26.4 27.5 26.0  --  25.5   BUN mg/dL 13 15 16  --  17   CREATININE mg/dL 0.89 0.91 0.98  --  0.98   GLUCOSE mg/dL 87 78 89  --  168*   EGFR mL/min/1.73 70.7 68.9 63.0  --  63.0     Results from last 7 days   Lab Units 02/27/25  0432 02/26/25  0402 02/25/25  0305 02/24/25  0621   ALBUMIN g/dL 1.2* 1.8* 1.8* 1.8*   BILIRUBIN mg/dL 3.4* 3.8* 4.5* 6.3*   ALK PHOS U/L 212* 212* 211* 225*   AST (SGOT) U/L 75* 73* 66* 70*   ALT (SGPT) U/L 37* 39* 36* 38*     Results from last 7 days   Lab Units 02/27/25  0432 02/26/25  0402 02/25/25  0305 02/24/25  0621   CALCIUM mg/dL 7.5* 7.3* 7.1* 7.4*   ALBUMIN g/dL 1.2*  "1.8* 1.8* 1.8*       Results from last 7 days   Lab Units 02/23/25  2133 02/23/25 2025   HSTROP T ng/L 8 8     Results from last 7 days   Lab Units 02/24/25  1045 02/24/25  0621   SODIUM UR mmol/L <20  --    OSMOLALITY UR mOsm/kg 609  --    URIC ACID mg/dL  --  6.5*         Invalid input(s): \"LDLCALC\"  Results from last 7 days   Lab Units 02/24/25  0919   BLOODCX  No growth at 3 days  No growth at 3 days     Results from last 7 days   Lab Units 02/23/25  2330   COVID19  Not Detected       Test Results Pending at Discharge     Pending Results       None              Discharge Details        Discharge Medications        New Medications        Instructions Start Date   levoFLOXacin 750 MG tablet  Commonly known as: Levaquin   750 mg, Oral, Daily      metroNIDAZOLE 500 MG tablet  Commonly known as: Flagyl   500 mg, Oral, 3 Times Daily      pantoprazole 40 MG EC tablet  Commonly known as: PROTONIX   40 mg, Oral, Every Early Morning   Start Date: February 28, 2025            Continue These Medications        Instructions Start Date   cholecalciferol 25 MCG (1000 UT) tablet  Commonly known as: VITAMIN D3   2,000 Units, Oral, Daily      folic acid 800 MCG tablet  Commonly known as: FOLVITE   800 mcg, Oral, Daily      furosemide 40 MG tablet  Commonly known as: LASIX   80 mg, Oral, Daily      lisinopril 40 MG tablet  Commonly known as: PRINIVIL,ZESTRIL   TAKE ONE TABLET BY MOUTH DAILY      loratadine-pseudoephedrine  MG per 24 hr tablet  Commonly known as: Claritin-D 24 Hour   1 tablet, Oral, Daily      vitamin B-12 100 MCG tablet  Commonly known as: CYANOCOBALAMIN   1,000 mcg      vitamin E 1000 UNIT capsule   1,000 Units, Oral, Daily             Stop These Medications      ibuprofen 200 MG tablet  Commonly known as: ADVIL,MOTRIN              Allergies   Allergen Reactions    Propoxyphene Nausea And Vomiting    Benicar [Olmesartan] Swelling    Fiorinal [Butalbital-Aspirin-Caffeine]     Propoxyphene-Acetaminophen  "       Discharge Disposition:  Home or Self Care      Discharge Diet:  Diet Order   Procedures    Diet: Cardiac, Fluid Restriction (240 mL/tray); Healthy Heart (2-3 Na+); 1500 mL/day; Texture: Soft to Chew (NDD 3); Soft to Chew: Whole Meat; Fluid Consistency: Thin (IDDSI 0)       Discharge Activity:   Activity Instructions       Gradually Increase Activity Until at Pre-Hospitalization Level              CODE STATUS:    Code Status and Medical Interventions: CPR (Attempt to Resuscitate); Full Support   Ordered at: 02/24/25 0126     Code Status (Patient has no pulse and is not breathing):    CPR (Attempt to Resuscitate)     Medical Interventions (Patient has pulse or is breathing):    Full Support       No future appointments.  Additional Instructions for the Follow-ups that You Need to Schedule       Discharge Follow-up with PCP   As directed       Currently Documented PCP:    Johanny Hall MD    PCP Phone Number:    [phone]     Follow Up Details: Within 1 week after discharge for reassessment, medication symptom review, blood pressure and blood glucose check, CMP and CBC        Discharge Follow-up with Specialty: Gastroenterology   As directed      Specialty: Gastroenterology   Follow Up Details: Please follow-up with your primary gastroenterologist within 1 to 2 weeks after discharge or as scheduled.  Please call their office to ensure follow-up appointment is made.               Follow-up Information       Johanny Hall MD .    Specialty: Internal Medicine  Why: Within 1 week after discharge for reassessment, medication symptom review, blood pressure and blood glucose check, CMP and CBC  Contact information:  [address] [facility]  [address] [place] [address]  [phone]                             Additional Instructions for the Follow-ups that You Need to Schedule       Discharge Follow-up with PCP   As directed       Currently Documented PCP:    Johanny Hall MD    PCP Phone Number:     [phone]     Follow Up Details: Within 1 week after discharge for reassessment, medication symptom review, blood pressure and blood glucose check, CMP and CBC        Discharge Follow-up with Specialty: Gastroenterology   As directed      Specialty: Gastroenterology   Follow Up Details: Please follow-up with your primary gastroenterologist within 1 to 2 weeks after discharge or as scheduled.  Please call their office to ensure follow-up appointment is made.            Time Spent on Discharge:  Greater than 30 minutes      Thomas Najera DO  [facility]  02/27/25  10:59 EST

## 2025-02-28 NOTE — OUTREACH NOTE
Prep Survey      Flowsheet Row Responses   [facility] facility patient discharged from? [facility]   Is LACE score < 7 ? No   Eligibility Readm Mgmt   Discharge diagnosis Chest pain   Does the patient have one of the following disease processes/diagnoses(primary or secondary)? Other   Does the patient have Home health ordered? No   Is there a DME ordered? No   Prep survey completed? Yes            AARON LANG - Registered Nurse

## 2025-03-01 LAB
BACTERIA SPEC AEROBE CULT: NORMAL
BACTERIA SPEC AEROBE CULT: NORMAL

## 2025-03-03 ENCOUNTER — READMISSION MANAGEMENT (OUTPATIENT)
Dept: CALL CENTER | Facility: HOSPITAL | Age: 70
End: 2025-03-03
Payer: MEDICARE

## 2025-03-03 NOTE — OUTREACH NOTE
Medical Week 1 Survey      Flowsheet Row Responses   [facility] patient discharged from? [facility]   Does the patient have one of the following disease processes/diagnoses(primary or secondary)? Other   Week 1 attempt successful? Yes   Call start time 1632   Call end time 1635   Discharge diagnosis Chest pain   Person spoke with today (if not patient) and relationship Spouse- Edward   Meds reviewed with patient/caregiver? Yes   Prescription comments Finished abx   Does the patient have a primary care provider?  Yes   Comments regarding PCP Sees pcp on Friday   Has home health visited the patient within 72 hours of discharge? N/A   Psychosocial issues? No   Did the patient receive a copy of their discharge instructions? Yes   Nursing interventions Reviewed instructions with patient   What is the patient's perception of their health status since discharge? Improving   Is the patient/caregiver able to teach back signs and symptoms related to disease process for when to call PCP? Yes   Is the patient/caregiver able to teach back signs and symptoms related to disease process for when to call 911? Yes   Is the patient/caregiver able to teach back the hierarchy of who to call/visit for symptoms/problems? PCP, Specialist, Home health nurse, Urgent Care, ED, 911 Yes   Week 1 call completed? Yes   Graduated Yes   Wrap up additional comments Spouse reports patient is doing well. No concerns or questions noted.   Call end time 1635            Lorena HAYES - Registered Nurse

## 2025-03-14 ENCOUNTER — HOSPITAL ENCOUNTER (INPATIENT)
Facility: HOSPITAL | Age: 70
LOS: 6 days | Discharge: HOME | DRG: 186 | End: 2025-03-20
Attending: EMERGENCY MEDICINE | Admitting: INTERNAL MEDICINE
Payer: MEDICARE

## 2025-03-14 ENCOUNTER — APPOINTMENT (OUTPATIENT)
Dept: GENERAL RADIOLOGY | Facility: HOSPITAL | Age: 70
DRG: 186 | End: 2025-03-14
Payer: MEDICARE

## 2025-03-14 ENCOUNTER — APPOINTMENT (OUTPATIENT)
Dept: CT IMAGING | Facility: HOSPITAL | Age: 70
DRG: 186 | End: 2025-03-14
Payer: MEDICARE

## 2025-03-14 DIAGNOSIS — R06.00 DYSPNEA, UNSPECIFIED TYPE: ICD-10-CM

## 2025-03-14 DIAGNOSIS — N17.9 ACUTE KIDNEY INJURY: ICD-10-CM

## 2025-03-14 DIAGNOSIS — R07.9 CHEST PAIN, UNSPECIFIED TYPE: ICD-10-CM

## 2025-03-14 DIAGNOSIS — R74.01 TRANSAMINITIS: ICD-10-CM

## 2025-03-14 DIAGNOSIS — J90 PLEURAL EFFUSION: Primary | ICD-10-CM

## 2025-03-14 PROBLEM — J18.9 PNEUMONIA INVOLVING RIGHT LUNG: Status: ACTIVE | Noted: 2025-03-14

## 2025-03-14 LAB
ALBUMIN SERPL-MCNC: 2.1 G/DL (ref 3.5–5.2)
ALBUMIN/GLOB SERPL: 0.5 G/DL
ALP SERPL-CCNC: 220 U/L (ref 39–117)
ALT SERPL W P-5'-P-CCNC: 34 U/L (ref 1–33)
ANION GAP SERPL CALCULATED.3IONS-SCNC: 9.1 MMOL/L (ref 5–15)
AST SERPL-CCNC: 68 U/L (ref 1–32)
BASOPHILS # BLD AUTO: 0.07 10*3/MM3 (ref 0–0.2)
BASOPHILS NFR BLD AUTO: 0.7 % (ref 0–1.5)
BILIRUB SERPL-MCNC: 4.5 MG/DL (ref 0–1.2)
BUN SERPL-MCNC: 15 MG/DL (ref 8–23)
BUN/CREAT SERPL: 10.9 (ref 7–25)
CALCIUM SPEC-SCNC: 7.8 MG/DL (ref 8.6–10.5)
CHLORIDE SERPL-SCNC: 98 MMOL/L (ref 98–107)
CO2 SERPL-SCNC: 30.9 MMOL/L (ref 22–29)
CREAT SERPL-MCNC: 1.37 MG/DL (ref 0.57–1)
D-LACTATE SERPL-SCNC: 4.2 MMOL/L (ref 0.5–2)
DEPRECATED RDW RBC AUTO: 50.3 FL (ref 37–54)
EGFRCR SERPLBLD CKD-EPI 2021: 42.1 ML/MIN/1.73
EOSINOPHIL # BLD AUTO: 0.1 10*3/MM3 (ref 0–0.4)
EOSINOPHIL NFR BLD AUTO: 1 % (ref 0.3–6.2)
ERYTHROCYTE [DISTWIDTH] IN BLOOD BY AUTOMATED COUNT: 14.5 % (ref 12.3–15.4)
GEN 5 1HR TROPONIN T REFLEX: 10 NG/L
GLOBULIN UR ELPH-MCNC: 4.3 GM/DL
GLUCOSE SERPL-MCNC: 132 MG/DL (ref 65–99)
HCT VFR BLD AUTO: 36 % (ref 34–46.6)
HGB BLD-MCNC: 13 G/DL (ref 12–15.9)
HOLD SPECIMEN: NORMAL
HOLD SPECIMEN: NORMAL
IMM GRANULOCYTES # BLD AUTO: 0.05 10*3/MM3 (ref 0–0.05)
IMM GRANULOCYTES NFR BLD AUTO: 0.5 % (ref 0–0.5)
LYMPHOCYTES # BLD AUTO: 1.1 10*3/MM3 (ref 0.7–3.1)
LYMPHOCYTES NFR BLD AUTO: 10.8 % (ref 19.6–45.3)
MCH RBC QN AUTO: 34.4 PG (ref 26.6–33)
MCHC RBC AUTO-ENTMCNC: 36.1 G/DL (ref 31.5–35.7)
MCV RBC AUTO: 95.2 FL (ref 79–97)
MONOCYTES # BLD AUTO: 1.17 10*3/MM3 (ref 0.1–0.9)
MONOCYTES NFR BLD AUTO: 11.5 % (ref 5–12)
NEUTROPHILS NFR BLD AUTO: 7.72 10*3/MM3 (ref 1.7–7)
NEUTROPHILS NFR BLD AUTO: 75.5 % (ref 42.7–76)
NRBC BLD AUTO-RTO: 0 /100 WBC (ref 0–0.2)
NT-PROBNP SERPL-MCNC: 142 PG/ML (ref 0–900)
PLATELET # BLD AUTO: 142 10*3/MM3 (ref 140–450)
PMV BLD AUTO: 10.2 FL (ref 6–12)
POTASSIUM SERPL-SCNC: 3.2 MMOL/L (ref 3.5–5.2)
PROCALCITONIN SERPL-MCNC: 0.28 NG/ML (ref 0–0.25)
PROT SERPL-MCNC: 6.4 G/DL (ref 6–8.5)
QT INTERVAL: 390 MS
QTC INTERVAL: 514 MS
RBC # BLD AUTO: 3.78 10*6/MM3 (ref 3.77–5.28)
SODIUM SERPL-SCNC: 138 MMOL/L (ref 136–145)
TROPONIN T NUMERIC DELTA: 0 NG/L
TROPONIN T SERPL HS-MCNC: 10 NG/L
WBC NRBC COR # BLD AUTO: 10.21 10*3/MM3 (ref 3.4–10.8)
WHOLE BLOOD HOLD COAG: NORMAL
WHOLE BLOOD HOLD SPECIMEN: NORMAL

## 2025-03-14 PROCEDURE — 25810000003 SODIUM CHLORIDE 0.9 % SOLUTION: Performed by: EMERGENCY MEDICINE

## 2025-03-14 PROCEDURE — 36415 COLL VENOUS BLD VENIPUNCTURE: CPT

## 2025-03-14 PROCEDURE — 93005 ELECTROCARDIOGRAM TRACING: CPT

## 2025-03-14 PROCEDURE — 25010000002 VANCOMYCIN 10 G RECONSTITUTED SOLUTION: Performed by: EMERGENCY MEDICINE

## 2025-03-14 PROCEDURE — 25510000001 IOPAMIDOL PER 1 ML: Performed by: EMERGENCY MEDICINE

## 2025-03-14 PROCEDURE — 83605 ASSAY OF LACTIC ACID: CPT | Performed by: EMERGENCY MEDICINE

## 2025-03-14 PROCEDURE — 83880 ASSAY OF NATRIURETIC PEPTIDE: CPT | Performed by: EMERGENCY MEDICINE

## 2025-03-14 PROCEDURE — 25010000002 CEFTRIAXONE PER 250 MG: Performed by: EMERGENCY MEDICINE

## 2025-03-14 PROCEDURE — 99285 EMERGENCY DEPT VISIT HI MDM: CPT

## 2025-03-14 PROCEDURE — 71275 CT ANGIOGRAPHY CHEST: CPT

## 2025-03-14 PROCEDURE — 84145 PROCALCITONIN (PCT): CPT | Performed by: EMERGENCY MEDICINE

## 2025-03-14 PROCEDURE — 71045 X-RAY EXAM CHEST 1 VIEW: CPT

## 2025-03-14 PROCEDURE — 87040 BLOOD CULTURE FOR BACTERIA: CPT | Performed by: EMERGENCY MEDICINE

## 2025-03-14 PROCEDURE — 93005 ELECTROCARDIOGRAM TRACING: CPT | Performed by: EMERGENCY MEDICINE

## 2025-03-14 PROCEDURE — 93010 ELECTROCARDIOGRAM REPORT: CPT | Performed by: STUDENT IN AN ORGANIZED HEALTH CARE EDUCATION/TRAINING PROGRAM

## 2025-03-14 PROCEDURE — 84484 ASSAY OF TROPONIN QUANT: CPT | Performed by: EMERGENCY MEDICINE

## 2025-03-14 PROCEDURE — 80053 COMPREHEN METABOLIC PANEL: CPT | Performed by: EMERGENCY MEDICINE

## 2025-03-14 PROCEDURE — 85025 COMPLETE CBC W/AUTO DIFF WBC: CPT

## 2025-03-14 RX ORDER — BISACODYL 5 MG/1
5 TABLET, DELAYED RELEASE ORAL DAILY PRN
Status: DISCONTINUED | OUTPATIENT
Start: 2025-03-14 | End: 2025-03-20 | Stop reason: HOSPADM

## 2025-03-14 RX ORDER — METRONIDAZOLE 500 MG/100ML
500 INJECTION, SOLUTION INTRAVENOUS ONCE
Status: COMPLETED | OUTPATIENT
Start: 2025-03-14 | End: 2025-03-14

## 2025-03-14 RX ORDER — IOPAMIDOL 755 MG/ML
100 INJECTION, SOLUTION INTRAVASCULAR
Status: COMPLETED | OUTPATIENT
Start: 2025-03-14 | End: 2025-03-14

## 2025-03-14 RX ORDER — NITROGLYCERIN 0.4 MG/1
0.4 TABLET SUBLINGUAL
Status: DISCONTINUED | OUTPATIENT
Start: 2025-03-14 | End: 2025-03-20 | Stop reason: HOSPADM

## 2025-03-14 RX ORDER — ONDANSETRON 2 MG/ML
4 INJECTION INTRAMUSCULAR; INTRAVENOUS EVERY 6 HOURS PRN
Status: DISCONTINUED | OUTPATIENT
Start: 2025-03-14 | End: 2025-03-20 | Stop reason: HOSPADM

## 2025-03-14 RX ORDER — BISACODYL 10 MG
10 SUPPOSITORY, RECTAL RECTAL DAILY PRN
Status: DISCONTINUED | OUTPATIENT
Start: 2025-03-14 | End: 2025-03-20 | Stop reason: HOSPADM

## 2025-03-14 RX ORDER — SODIUM CHLORIDE 0.9 % (FLUSH) 0.9 %
10 SYRINGE (ML) INJECTION AS NEEDED
Status: DISCONTINUED | OUTPATIENT
Start: 2025-03-14 | End: 2025-03-20 | Stop reason: HOSPADM

## 2025-03-14 RX ORDER — POLYETHYLENE GLYCOL 3350 17 G/17G
17 POWDER, FOR SOLUTION ORAL DAILY PRN
Status: DISCONTINUED | OUTPATIENT
Start: 2025-03-14 | End: 2025-03-20 | Stop reason: HOSPADM

## 2025-03-14 RX ORDER — ONDANSETRON 4 MG/1
4 TABLET, ORALLY DISINTEGRATING ORAL EVERY 6 HOURS PRN
Status: DISCONTINUED | OUTPATIENT
Start: 2025-03-14 | End: 2025-03-20 | Stop reason: HOSPADM

## 2025-03-14 RX ORDER — ASPIRIN 325 MG
325 TABLET ORAL ONCE
Status: DISCONTINUED | OUTPATIENT
Start: 2025-03-14 | End: 2025-03-20 | Stop reason: HOSPADM

## 2025-03-14 RX ORDER — ALUMINA, MAGNESIA, AND SIMETHICONE 2400; 2400; 240 MG/30ML; MG/30ML; MG/30ML
15 SUSPENSION ORAL EVERY 6 HOURS PRN
Status: DISCONTINUED | OUTPATIENT
Start: 2025-03-14 | End: 2025-03-20 | Stop reason: HOSPADM

## 2025-03-14 RX ORDER — GUAIFENESIN 600 MG/1
1200 TABLET, EXTENDED RELEASE ORAL EVERY 12 HOURS SCHEDULED
Status: DISCONTINUED | OUTPATIENT
Start: 2025-03-15 | End: 2025-03-20 | Stop reason: HOSPADM

## 2025-03-14 RX ORDER — AMOXICILLIN 250 MG
2 CAPSULE ORAL 2 TIMES DAILY PRN
Status: DISCONTINUED | OUTPATIENT
Start: 2025-03-14 | End: 2025-03-20 | Stop reason: HOSPADM

## 2025-03-14 RX ORDER — BENZONATATE 100 MG/1
200 CAPSULE ORAL 3 TIMES DAILY PRN
Status: DISCONTINUED | OUTPATIENT
Start: 2025-03-14 | End: 2025-03-20 | Stop reason: HOSPADM

## 2025-03-14 RX ADMIN — IOPAMIDOL 95 ML: 755 INJECTION, SOLUTION INTRAVENOUS at 19:54

## 2025-03-14 RX ADMIN — Medication 2250 MG: at 23:04

## 2025-03-14 RX ADMIN — CEFTRIAXONE 2000 MG: 2 INJECTION, POWDER, FOR SOLUTION INTRAMUSCULAR; INTRAVENOUS at 20:24

## 2025-03-14 RX ADMIN — METRONIDAZOLE 500 MG: 500 INJECTION, SOLUTION INTRAVENOUS at 21:32

## 2025-03-14 NOTE — ED PROVIDER NOTES
EMERGENCY DEPARTMENT ENCOUNTER    Room Number:  S407/1  Date of encounter:  3/17/2025  PCP: Johanny Hall MD  Historian: Patient  Relevant information and history provided by sources other than the patient will be included below and in the ED Course.  Review of pertinent past medical records may also be included in record below and ED Course.    HPI:  Chief Complaint: Chest pain and shortness of breath  A complete HPI/ROS/PMH/PSH/SH/FH are unobtainable due to: Not applicable  Context: Lacey Ramirez is a 69 y.o. female who presents to the ED c/o the symptoms started last night.  Felt like there was chest pain that was starting in the right breast and went across to the left side of the chest.  She describes it as an ache.  She states that it is worse with cough she has a mild cough.  It is not worse with exertion.  She was recently in the hospital for chest pain and she says that this seems a little bit different than that chest pain.  She has had no nausea or vomiting.  She has had some shortness of breath for the past couple of days.  She also does report some chronic shortness of breath with exertion.  She denies any new swelling to her abdomen.  She is aware that she does have cirrhosis and chronic liver failure.  Also denies any new swelling to her lower extremities.  When she was recently in the hospital did have colitis she did complete her Levaquin and Flagyl.  She has no abdominal pain at all.  She denies any fevers or chills.  Denies any headache.  Denies any focal weakness to arms or legs.  She went to the urgent care center and the urgent care center referred her here to our emergency department.    She has been compliant with her Lasix after recent discharge from the hospital she is taking 80 Lasix a day.    Previous Episodes: No this feels not the exact same as previous chest pain when she was in the hospital a few weeks ago  Current Symptoms: See above    MEDICAL HISTORY REVIEWED  Can see this  patient was in the hospital February 23, 2025 and discharged February 27, 2025 she presented with chest pain her pain was not consistent with a cardiac etiology thought it was GI in origin.  Troponins were negative CT angiogram was negative for PE.  Protein pump and ordered and she did improve with that.  She did also have a CT scan of the abdomen which showed that she was concerning for potential colitis.  She was started on Zosyn because she had mild elevation of white blood cell count.  She was transitioned to Levaquin and Flagyl as an outpatient.  She also did have cirrhosis noted on the CT scan of the abdomen pelvis with some ascites GI consulted she had a Doppler of her left lower extremity which was negative.  Also was prediabetic history of hypertension and hyponatremia.      PAST MEDICAL HISTORY  Active Ambulatory Problems     Diagnosis Date Noted    Benign essential hypertension 12/22/2017    Hyperlipidemia 12/22/2017    Impaired fasting glucose 12/22/2017    Adiposity 12/22/2017    Chest pain 02/24/2025    Prediabetes 02/24/2025    Abnormal CT of the abdomen 02/24/2025    Leukocytosis 02/24/2025    Hyponatremia 02/24/2025    Hypokalemia 02/24/2025    Transaminitis 02/24/2025    Cirrhosis 02/24/2025    Anemia 02/24/2025    Thrombocytopenia 02/24/2025     Resolved Ambulatory Problems     Diagnosis Date Noted    No Resolved Ambulatory Problems     Past Medical History:   Diagnosis Date    Hypertension          PAST SURGICAL HISTORY  Past Surgical History:   Procedure Laterality Date    APPENDECTOMY      CHOLECYSTECTOMY      HYSTERECTOMY  1983    TONSILLECTOMY           FAMILY HISTORY  Family History   Problem Relation Age of Onset    Emphysema Mother     Heart disease Maternal Uncle     Alcohol abuse Maternal Uncle     Hypertension Maternal Grandmother     Arthritis Maternal Grandmother     Heart disease Maternal Grandfather          SOCIAL HISTORY  Social History     Socioeconomic History    Marital status:     Tobacco Use    Smoking status: Never    Smokeless tobacco: Never   Vaping Use    Vaping status: Never Used   Substance and Sexual Activity    Alcohol use: No    Drug use: No    Sexual activity: Defer         ALLERGIES  Propoxyphene, Benicar [olmesartan], Cataflam [diclofenac], Fiorinal [butalbital-aspirin-caffeine], and Propoxyphene-acetaminophen        REVIEW OF SYSTEMS  Review of Systems     All systems reviewed and negative except for those discussed in HPI.       PHYSICAL EXAM    I have reviewed the triage vital signs and nursing notes.    ED Triage Vitals   Temp Heart Rate Resp BP SpO2   03/14/25 1706 03/14/25 1706 03/14/25 1706 03/14/25 1746 03/14/25 1706   97.3 °F (36.3 °C) 110 18 95/55 94 %      Temp src Heart Rate Source Patient Position BP Location FiO2 (%)   -- -- 03/14/25 1746 03/14/25 1746 --     Sitting Left arm        GENERAL: This is an elderly female that looks older than her stated age.  Appears chronically ill.  No acute cardiovascular respiratory distress.Vital signs on my initial evaluation O2 sat is 94% on room air.  Blood pressure on my exam is unremarkable.  Heart rate is 90s to low 100s.  HENT: nares patent  Head/neck/ face are symmetric without gross deformity, signs of trauma, or swelling  EYES: no scleral icterus, no conjunctival pallor.  NECK: Supple, no meningismus  CV: regular rhythm, regular rate with intact distal pulses.  RESPIRATORY: normal effort and no respiratory distress.  Creased breath sounds in the right mid lung zone and base.  ABDOMEN: soft and nontender.  Morbidly obese.  No tenderness on diffuse exam.  MUSCULOSKELETAL: no deformity.  Intact distal pulses that are equal strong and symmetric.  NEURO: alert and appropriate, moves all extremities, follows commands.  No focal motor or sensory changes.  SKIN: warm, dry    Vital signs and nursing notes reviewed.        LAB RESULTS  Recent Results (from the past 24 hours)   Body Fluid Culture - Body Fluid, Pleural  Cavity    Collection Time: 03/16/25 10:40 AM    Specimen: Pleural Cavity; Body Fluid   Result Value Ref Range    Body Fluid Culture No growth at less than 24 hours     Gram Stain No WBCs or organisms seen    pH, Body Fluid - Pleural Fluid, Pleural Cavity    Collection Time: 03/16/25 11:11 AM    Specimen: Pleural Cavity; Pleural Fluid   Result Value Ref Range    pH, Fluid 7.50    Albumin, Fluid - Pleural Fluid, Pleural Cavity    Collection Time: 03/16/25 11:11 AM    Specimen: Pleural Cavity; Pleural Fluid   Result Value Ref Range    Albumin, Fluid 0.80 g/dL   Protein, Body Fluid - Pleural Fluid, Pleural Cavity    Collection Time: 03/16/25 11:11 AM    Specimen: Pleural Cavity; Pleural Fluid   Result Value Ref Range    Protein, Total, Fluid 1.5 g/dL   Lactate Dehydrogenase, Body Fluid - Pleural Fluid, Pleural Cavity    Collection Time: 03/16/25 11:11 AM    Specimen: Pleural Cavity; Pleural Fluid   Result Value Ref Range    Lactate Dehydrogenase (LD), Fluid 78 U/L   Glucose, Body Fluid - Pleural Fluid, Pleural Cavity    Collection Time: 03/16/25 11:11 AM    Specimen: Pleural Cavity; Pleural Fluid   Result Value Ref Range    Glucose, Fluid 106 mg/dL   Triglycerides, Body Fluid - Pleural Fluid, Pleural Cavity    Collection Time: 03/16/25 11:11 AM    Specimen: Pleural Cavity; Pleural Fluid   Result Value Ref Range    Triglycerides, Fluid 53 mg/dL   Cholesterol, Body Fluid - Pleural Fluid, Pleural Cavity    Collection Time: 03/16/25 11:11 AM    Specimen: Pleural Cavity; Pleural Fluid   Result Value Ref Range    Cholesterol  24 mg/dL   Amylase, Body Fluid - Pleural Fluid, Pleural Cavity    Collection Time: 03/16/25 11:11 AM    Specimen: Pleural Cavity; Pleural Fluid   Result Value Ref Range    Amylase, Fluid 14 U/L   Body fluid cell count - Pleural Fluid, Pleural Cavity    Collection Time: 03/16/25 11:11 AM    Specimen: Pleural Cavity; Pleural Fluid   Result Value Ref Range    Color, Fluid Yellow     Appearance, Fluid Hazy  (A) Clear    RBC, Fluid 1,168 /mm3    Nucleated Cells, Fluid 1,013 /mm3    Method: UF 5000 Automated Method    Body fluid differential - Pleural Fluid, Pleural Cavity    Collection Time: 03/16/25 11:11 AM    Specimen: Pleural Cavity; Pleural Fluid   Result Value Ref Range    Neutrophils, Fluid % 12 %    Lymphocytes, Fluid % 41 %    Monocytes, Fluid % 21 %    Mononuclear, Fluid % 26 %   CBC (No Diff)    Collection Time: 03/16/25  1:48 PM    Specimen: Blood   Result Value Ref Range    WBC 7.41 3.40 - 10.80 10*3/mm3    RBC 3.19 (L) 3.77 - 5.28 10*6/mm3    Hemoglobin 10.9 (L) 12.0 - 15.9 g/dL    Hematocrit 31.3 (L) 34.0 - 46.6 %    MCV 98.1 (H) 79.0 - 97.0 fL    MCH 34.2 (H) 26.6 - 33.0 pg    MCHC 34.8 31.5 - 35.7 g/dL    RDW 14.1 12.3 - 15.4 %    RDW-SD 51.4 37.0 - 54.0 fl    MPV 10.0 6.0 - 12.0 fL    Platelets 99 (L) 140 - 450 10*3/mm3   Basic Metabolic Panel    Collection Time: 03/16/25  1:48 PM    Specimen: Blood   Result Value Ref Range    Glucose 140 (H) 65 - 99 mg/dL    BUN 17 8 - 23 mg/dL    Creatinine 0.92 0.57 - 1.00 mg/dL    Sodium 138 136 - 145 mmol/L    Potassium 3.9 3.5 - 5.2 mmol/L    Chloride 101 98 - 107 mmol/L    CO2 26.6 22.0 - 29.0 mmol/L    Calcium 8.3 (L) 8.6 - 10.5 mg/dL    BUN/Creatinine Ratio 18.5 7.0 - 25.0    Anion Gap 10.4 5.0 - 15.0 mmol/L    eGFR 68.0 >60.0 mL/min/1.73   Protime-INR    Collection Time: 03/16/25  1:48 PM    Specimen: Blood   Result Value Ref Range    Protime 23.9 (H) 11.7 - 14.2 Seconds    INR 2.12 (H) 0.90 - 1.10   Magnesium    Collection Time: 03/16/25  1:48 PM    Specimen: Blood   Result Value Ref Range    Magnesium 2.2 1.6 - 2.4 mg/dL       Ordered the above labs and independently reviewed the results.        RADIOLOGY  US Renal Bilateral  Result Date: 3/16/2025  US RENAL BILATERAL-  Clinical: Acute renal insufficiency  FINDINGS: The right kidney is 8.4 cm in length and the left kidney is 11.5 cm in length. The right kidney was less than optimally demonstrated due to the  patient's body habitus, lower pole cannot be seen. No obstructive uropathy on the right or left. The left kidney is satisfactory in appearance. No calculus. The renal cortical echotexture is within normal limits. The bladder was collapsed cannot be evaluated. The remainder is unremarkable.  This report was finalized on 3/16/2025 6:10 PM by Dr. Fernando Fields M.D on Workstation: [id number]      US Thoracentesis  Result Date: 3/16/2025  ULTRASOUND-GUIDED RIGHT THORACENTESIS  HISTORY: Large right pleural effusion. Shortness of breath.  Following sterile prep and local anesthetic, a 5 Khmer thoracentesis catheter was inserted into the right-sided pleural effusion by Dr. Hansen. 1.9 L of straw-colored pleural fluid was removed with suction technique.  The patient developed some chest pain and persistent coughing following the procedure and a follow-up upright chest x-ray shows marked reduction in size of the pleural effusion and no pneumothorax. She was observed in triage with subsequent improvement in her symptoms.  This report was finalized on 3/16/2025 11:25 AM by Dr. Luis Fernando Hansen M.D on Workstation: [id number]      XR Chest 1 View  Result Date: 3/16/2025  ONE-VIEW PORTABLE CHEST AT 11:03 A.M.  HISTORY: Chest pain and cough following large volume right thoracentesis.  The post thoracentesis chest x-ray shows no evidence of pneumothorax. There is a very small residual right pleural effusion including some fluid tracking into the minor fissure but this shows marked improvement from 2 days ago. The left lung remains clear.  This report was finalized on 3/16/2025 11:23 AM by Dr. Luis Fernando Hansen M.D on Workstation: [id number]        I ordered the above noted radiological studies. Reviewed by me and discussed with radiologist.  See dictation for official radiology interpretation.      PROCEDURES    Procedures      MEDICATIONS GIVEN IN ER    Medications   sodium chloride 0.9 % flush 10 mL (has no administration in  time range)   aspirin tablet 325 mg (0 mg Oral Hold 3/14/25 1752)   sodium chloride 0.9 % flush 10 mL (has no administration in time range)   nitroglycerin (NITROSTAT) SL tablet 0.4 mg (has no administration in time range)   melatonin tablet 5 mg (has no administration in time range)   sennosides-docusate (PERICOLACE) 8.6-50 MG per tablet 2 tablet (has no administration in time range)     And   polyethylene glycol (MIRALAX) packet 17 g (has no administration in time range)     And   bisacodyl (DULCOLAX) EC tablet 5 mg (has no administration in time range)     And   bisacodyl (DULCOLAX) suppository 10 mg (has no administration in time range)   ondansetron ODT (ZOFRAN-ODT) disintegrating tablet 4 mg (has no administration in time range)     Or   ondansetron (ZOFRAN) injection 4 mg (has no administration in time range)   aluminum-magnesium hydroxide-simethicone (MAALOX MAX) 400-400-40 MG/5ML suspension 15 mL (has no administration in time range)   guaiFENesin (MUCINEX) 12 hr tablet 1,200 mg (1,200 mg Oral Given 3/16/25 2109)   benzonatate (TESSALON) capsule 200 mg (200 mg Oral Given 3/17/25 0020)   Potassium Replacement - Follow Nurse / BPA Driven Protocol (has no administration in time range)   Magnesium Standard Dose Replacement - Follow Nurse / BPA Driven Protocol (has no administration in time range)   Phosphorus Replacement - Follow Nurse / BPA Driven Protocol (has no administration in time range)   Calcium Replacement - Follow Nurse / BPA Driven Protocol (has no administration in time range)   midodrine (PROAMATINE) tablet 5 mg (5 mg Oral Given 3/16/25 1754)   cefTRIAXone (ROCEPHIN) 2,000 mg in sodium chloride 0.9 % 100 mL MBP (2,000 mg Intravenous New Bag 3/16/25 2108)   pantoprazole (PROTONIX) EC tablet 40 mg (40 mg Oral Given 3/16/25 0839)   sennosides-docusate (PERICOLACE) 8.6-50 MG per tablet 1 tablet (1 tablet Oral Given 3/16/25 2109)   cefTRIAXone (ROCEPHIN) 2,000 mg in sodium chloride 0.9 % 100 mL MBP (0  mg Intravenous Stopped 3/14/25 2121)   iopamidol (ISOVUE-370) 76 % injection 100 mL (95 mL Intravenous Given by Other 3/14/25 1954)   metroNIDAZOLE (FLAGYL) IVPB 500 mg (0 mg Intravenous Stopped 3/14/25 2207)   vancomycin 2250 mg/500 mL 0.9% NS IVPB ([facility]) (2,250 mg Intravenous New Bag 3/14/25 2304)   potassium chloride (KLOR-CON M20) CR tablet 40 mEq (40 mEq Oral Given 3/15/25 1228)   albumin human 25 % IV SOLN 50 g (50 g Intravenous New Bag 3/17/25 0021)   lidocaine (XYLOCAINE) 1 % injection 10 mL (10 mL Subcutaneous Given 3/16/25 1040)   lidocaine (XYLOCAINE) 1 % injection 10 mL (10 mL Subcutaneous Given 3/16/25 1040)         All labs have been independently reviewed by me.  All radiology studies have been reviewed by me and I discussed with radiologist dictating the report when indicated below.  All EKG's independently viewed and interpreted by me.  Discussion below represents my analysis of pertinent findings related to patient's condition, differential diagnosis, treatment plan and final disposition.        PROGRESS, DATA ANALYSIS, CONSULTS, AND MEDICAL DECISION MAKING    DDx includes CHF, acute coronary syndrome, pulmonary embolism, pneumothorax, pneumonia, asthma/COPD,aspiration,  pulmonary hypertension, metabolic acidosis, deconditioning, anemia, other hematologic etiologies such as CO poisoning, anxiety.   This patient has a new large right pleural effusion compared to just a few weeks ago.  Informed her that she will need to get a CT scan of her chest and she will need to be admitted to the hospital.  Will tanya check some lab work.  I do not see any acute change on the EKG.  All questions answered at this time.      ED Course as of 03/17/25 0226   Fri Mar 14, 2025   1747 Dependently looked at the x-ray patient has a very large appearance of pleural effusion on the right.  This is new compared to an x-ray on February 23, 2025 that I looked at.  I do not see a pneumothorax.  I did also review the radiologist  report. [MM]   1749 My own independent or potation of the EKG that was done at 5:09 PM reveals a rate of 104 it is a sinus tachycardia mild intravelar conduction delay normal axis and appreciate any acute injury pattern.  QT looks mildly prolonged and a couple leads  Compared to the previous EKG that was done on 2/23/2025 and it looks fairly similar. [MM]   1828 Glucose(!): 132 [MM]   1828 Creatinine(!): 1.37 [MM]   1828 Potassium(!): 3.2 [MM]   1828 Albumin(!): 2.1 [MM]   1828 ALT (SGPT)(!): 34 [MM]   1828 AST (SGOT)(!): 68 [MM]   1828 Alkaline Phosphatase(!): 220 [MM]   1828 Total Bilirubin(!): 4.5  From 2 weeks ago she does have a creatinine of 1.37 today.  It was normal at 0.892 weeks ago.  She is taking 80 Lasix a day.  That could be a contributing factor.  Potassium is mildly low at 3.2.  She has chronic elevation of the liver function tests which appear fairly stable.  She is also hypoalbuminemic. [MM]   1856 Lactate(!!): 4.2 [MM]   1856 Procalcitonin(!): 0.28 [MM]   1856 She does have cirrhosis.  Cirrhosis can cause an elevation of lactic acid and procalcitonin. [MM]   2009 I did discuss the case with Dayami who is on for [facility].  Informed her of the patient's presenting symptoms and her recent admission and the results of the test.  She agrees to admit the patient to the hospital.  Dr. Smith will be the attending physician. [MM]   2010 CT scan of the chest has been completed.  Awaiting for radiologist interpretation.  Dayami will follow-up with the radiologist interpretation. [MM]   2011 My own independent or potation of the CT angiogram of the chest as I do not see any major PE.  Patient has a very large pleural effusion on the right.  There appears to be some compressive atelectasis as well.  Again waiting for the radiologist complete interpretation. [MM]   2105 Still waiting for final read from the radiologist.  Dr. Smith has seen the patient in the emergency department. [MM]   2106 I did reevaluate  the patient.  She is not wanting anything for pain.  Her pain is controlled.  Heart rate is in the 80s to 90s.  Blood pressure is normal.  Oxygen saturation on 2 L is 96%.  Has an occasional cough.  But is in no obvious distress.  Informed patient as well as significant in the room about the results of the test and the treatment plan at this time.  All questions answered [MM]   2116 The CT of the angiogram of the chest from the radiologist is best been complete is negative for pulmonary embolism there is a large right pleural effusion with complete or near complete consolidation and volume loss of the right lower lobe and patchy consolidation and volume loss in the base of the right upper lobe and perihilar right middle lobe likely a superimposed pneumonia there is trace left pleural effusion partially imaged cirrhotic liver with evidence of portal venous hypertension.  Please see complete dictated report from radiologist [MM]      ED Course User Index  [MM] Mahad Billy MD       AS OF 02:26 EDT VITALS:    BP - 135/61  HR - 101  TEMP - 97.9 °F (36.6 °C) (Oral)  02 SATS - 91%    SOCIAL DETERMINANTS OF HEALTH THAT IMPACT OR LIMIT CARE (For example..Homelessness,safe discharge, inability to obtain care, follow up, or prescriptions):      DIAGNOSIS  Final diagnoses:   Pleural effusion: Right chest and large   Chest pain, unspecified type   Dyspnea, unspecified type   Transaminitis   Acute kidney injury         DISPOSITION  I have reviewed the test results with my patient and explained the current treatment plan.  I answered all of the patient's questions.  The patient will be admitted to monitor bed at this time.  The patient is not hypotensive and is tolerating their current disease condition well enough for a monitored bed at this time.  The patient's current condition does not require intensive care treatment at this time.            DICTATED UTILIZING Paid To Party LLCON DICTATION    Note Disclaimer: At [facility], we  believe that sharing information builds trust and better relationships. You are receiving this note because you recently visited [facility]. It is possible you will see health information before a provider has talked with you about it. This kind of information can be easy to misunderstand. To help you fully understand what it means for your health, we urge you to discuss this note with your provider.       Mahad Billy MD  03/14/25 [id number]       Mahad Billy MD  03/17/25 0029

## 2025-03-15 LAB
ALBUMIN SERPL-MCNC: 1.9 G/DL (ref 3.5–5.2)
ALBUMIN/GLOB SERPL: 0.5 G/DL
ALP SERPL-CCNC: 205 U/L (ref 39–117)
ALT SERPL W P-5'-P-CCNC: 28 U/L (ref 1–33)
ANION GAP SERPL CALCULATED.3IONS-SCNC: 7.2 MMOL/L (ref 5–15)
ANION GAP SERPL CALCULATED.3IONS-SCNC: 8.6 MMOL/L (ref 5–15)
AST SERPL-CCNC: 61 U/L (ref 1–32)
BASOPHILS # BLD AUTO: 0.07 10*3/MM3 (ref 0–0.2)
BASOPHILS NFR BLD AUTO: 0.6 % (ref 0–1.5)
BILIRUB SERPL-MCNC: 3.7 MG/DL (ref 0–1.2)
BUN SERPL-MCNC: 19 MG/DL (ref 8–23)
BUN SERPL-MCNC: 20 MG/DL (ref 8–23)
BUN/CREAT SERPL: 14.6 (ref 7–25)
BUN/CREAT SERPL: 15.6 (ref 7–25)
CALCIUM SPEC-SCNC: 7.5 MG/DL (ref 8.6–10.5)
CALCIUM SPEC-SCNC: 7.7 MG/DL (ref 8.6–10.5)
CHLORIDE SERPL-SCNC: 96 MMOL/L (ref 98–107)
CHLORIDE SERPL-SCNC: 96 MMOL/L (ref 98–107)
CO2 SERPL-SCNC: 28.4 MMOL/L (ref 22–29)
CO2 SERPL-SCNC: 29.8 MMOL/L (ref 22–29)
CREAT SERPL-MCNC: 1.22 MG/DL (ref 0.57–1)
CREAT SERPL-MCNC: 1.37 MG/DL (ref 0.57–1)
D-LACTATE SERPL-SCNC: 1.5 MMOL/L (ref 0.5–2)
D-LACTATE SERPL-SCNC: 2.4 MMOL/L (ref 0.5–2)
D-LACTATE SERPL-SCNC: 2.7 MMOL/L (ref 0.5–2)
DEPRECATED RDW RBC AUTO: 47 FL (ref 37–54)
DEPRECATED RDW RBC AUTO: 51.5 FL (ref 37–54)
EGFRCR SERPLBLD CKD-EPI 2021: 42.1 ML/MIN/1.73
EGFRCR SERPLBLD CKD-EPI 2021: 48.4 ML/MIN/1.73
EOSINOPHIL # BLD AUTO: 0.19 10*3/MM3 (ref 0–0.4)
EOSINOPHIL NFR BLD AUTO: 1.8 % (ref 0.3–6.2)
ERYTHROCYTE [DISTWIDTH] IN BLOOD BY AUTOMATED COUNT: 13.7 % (ref 12.3–15.4)
ERYTHROCYTE [DISTWIDTH] IN BLOOD BY AUTOMATED COUNT: 14.4 % (ref 12.3–15.4)
GLOBULIN UR ELPH-MCNC: 3.7 GM/DL
GLUCOSE SERPL-MCNC: 103 MG/DL (ref 65–99)
GLUCOSE SERPL-MCNC: 121 MG/DL (ref 65–99)
HCT VFR BLD AUTO: 31.6 % (ref 34–46.6)
HCT VFR BLD AUTO: 34.2 % (ref 34–46.6)
HGB BLD-MCNC: 11.5 G/DL (ref 12–15.9)
HGB BLD-MCNC: 11.9 G/DL (ref 12–15.9)
IMM GRANULOCYTES # BLD AUTO: 0.06 10*3/MM3 (ref 0–0.05)
IMM GRANULOCYTES NFR BLD AUTO: 0.6 % (ref 0–0.5)
INR PPP: 1.82 (ref 0.9–1.1)
L PNEUMO1 AG UR QL IA: NEGATIVE
LDH SERPL-CCNC: 235 U/L (ref 135–214)
LYMPHOCYTES # BLD AUTO: 1.82 10*3/MM3 (ref 0.7–3.1)
LYMPHOCYTES NFR BLD AUTO: 16.8 % (ref 19.6–45.3)
MCH RBC QN AUTO: 34 PG (ref 26.6–33)
MCH RBC QN AUTO: 34.1 PG (ref 26.6–33)
MCHC RBC AUTO-ENTMCNC: 34.8 G/DL (ref 31.5–35.7)
MCHC RBC AUTO-ENTMCNC: 36.4 G/DL (ref 31.5–35.7)
MCV RBC AUTO: 93.8 FL (ref 79–97)
MCV RBC AUTO: 97.7 FL (ref 79–97)
MONOCYTES # BLD AUTO: 1 10*3/MM3 (ref 0.1–0.9)
MONOCYTES NFR BLD AUTO: 9.2 % (ref 5–12)
MRSA DNA SPEC QL NAA+PROBE: NORMAL
NEUTROPHILS NFR BLD AUTO: 7.71 10*3/MM3 (ref 1.7–7)
NEUTROPHILS NFR BLD AUTO: 71 % (ref 42.7–76)
NRBC BLD AUTO-RTO: 0.2 /100 WBC (ref 0–0.2)
OSMOLALITY UR: 371 MOSM/KG
PLATELET # BLD AUTO: 128 10*3/MM3 (ref 140–450)
PLATELET # BLD AUTO: 130 10*3/MM3 (ref 140–450)
PMV BLD AUTO: 10.6 FL (ref 6–12)
PMV BLD AUTO: 11 FL (ref 6–12)
POTASSIUM SERPL-SCNC: 3.3 MMOL/L (ref 3.5–5.2)
POTASSIUM SERPL-SCNC: 3.4 MMOL/L (ref 3.5–5.2)
POTASSIUM SERPL-SCNC: 3.8 MMOL/L (ref 3.5–5.2)
PROT SERPL-MCNC: 5.6 G/DL (ref 6–8.5)
PROT SERPL-MCNC: 5.8 G/DL (ref 6–8.5)
PROTHROMBIN TIME: 21.1 SECONDS (ref 11.7–14.2)
RBC # BLD AUTO: 3.37 10*6/MM3 (ref 3.77–5.28)
RBC # BLD AUTO: 3.5 10*6/MM3 (ref 3.77–5.28)
S PNEUM AG SPEC QL LA: NEGATIVE
SODIUM SERPL-SCNC: 133 MMOL/L (ref 136–145)
SODIUM SERPL-SCNC: 133 MMOL/L (ref 136–145)
SODIUM UR-SCNC: <20 MMOL/L
WBC NRBC COR # BLD AUTO: 10.55 10*3/MM3 (ref 3.4–10.8)
WBC NRBC COR # BLD AUTO: 10.85 10*3/MM3 (ref 3.4–10.8)

## 2025-03-15 PROCEDURE — 83605 ASSAY OF LACTIC ACID: CPT | Performed by: EMERGENCY MEDICINE

## 2025-03-15 PROCEDURE — 25010000002 CEFTRIAXONE PER 250 MG: Performed by: INTERNAL MEDICINE

## 2025-03-15 PROCEDURE — 87641 MR-STAPH DNA AMP PROBE: CPT | Performed by: NURSE PRACTITIONER

## 2025-03-15 PROCEDURE — 85025 COMPLETE CBC W/AUTO DIFF WBC: CPT

## 2025-03-15 PROCEDURE — 85610 PROTHROMBIN TIME: CPT | Performed by: INTERNAL MEDICINE

## 2025-03-15 PROCEDURE — 85027 COMPLETE CBC AUTOMATED: CPT | Performed by: NURSE PRACTITIONER

## 2025-03-15 PROCEDURE — 87899 AGENT NOS ASSAY W/OPTIC: CPT | Performed by: NURSE PRACTITIONER

## 2025-03-15 PROCEDURE — 83935 ASSAY OF URINE OSMOLALITY: CPT | Performed by: HOSPITALIST

## 2025-03-15 PROCEDURE — 80053 COMPREHEN METABOLIC PANEL: CPT | Performed by: NURSE PRACTITIONER

## 2025-03-15 PROCEDURE — P9047 ALBUMIN (HUMAN), 25%, 50ML: HCPCS | Performed by: HOSPITALIST

## 2025-03-15 PROCEDURE — 84132 ASSAY OF SERUM POTASSIUM: CPT | Performed by: INTERNAL MEDICINE

## 2025-03-15 PROCEDURE — 83615 LACTATE (LD) (LDH) ENZYME: CPT

## 2025-03-15 PROCEDURE — 25010000002 ALBUMIN HUMAN 25% PER 50 ML: Performed by: HOSPITALIST

## 2025-03-15 PROCEDURE — 84300 ASSAY OF URINE SODIUM: CPT | Performed by: HOSPITALIST

## 2025-03-15 PROCEDURE — 87449 NOS EACH ORGANISM AG IA: CPT | Performed by: NURSE PRACTITIONER

## 2025-03-15 RX ORDER — MIDODRINE HYDROCHLORIDE 5 MG/1
5 TABLET ORAL
Status: DISCONTINUED | OUTPATIENT
Start: 2025-03-15 | End: 2025-03-20 | Stop reason: HOSPADM

## 2025-03-15 RX ORDER — PANTOPRAZOLE SODIUM 40 MG/1
40 TABLET, DELAYED RELEASE ORAL
Status: DISCONTINUED | OUTPATIENT
Start: 2025-03-16 | End: 2025-03-20 | Stop reason: HOSPADM

## 2025-03-15 RX ORDER — ALBUMIN (HUMAN) 12.5 G/50ML
50 SOLUTION INTRAVENOUS EVERY 12 HOURS
Status: COMPLETED | OUTPATIENT
Start: 2025-03-15 | End: 2025-03-17

## 2025-03-15 RX ORDER — POTASSIUM CHLORIDE 1500 MG/1
40 TABLET, EXTENDED RELEASE ORAL EVERY 4 HOURS
Status: COMPLETED | OUTPATIENT
Start: 2025-03-15 | End: 2025-03-15

## 2025-03-15 RX ADMIN — ALBUMIN (HUMAN) 50 G: 0.25 INJECTION, SOLUTION INTRAVENOUS at 12:26

## 2025-03-15 RX ADMIN — GUAIFENESIN 1200 MG: 600 TABLET, MULTILAYER, EXTENDED RELEASE ORAL at 08:34

## 2025-03-15 RX ADMIN — CEFTRIAXONE 2000 MG: 2 INJECTION, POWDER, FOR SOLUTION INTRAMUSCULAR; INTRAVENOUS at 20:15

## 2025-03-15 RX ADMIN — GUAIFENESIN 1200 MG: 600 TABLET, MULTILAYER, EXTENDED RELEASE ORAL at 00:34

## 2025-03-15 RX ADMIN — BENZONATATE 200 MG: 100 CAPSULE ORAL at 19:42

## 2025-03-15 RX ADMIN — GUAIFENESIN 1200 MG: 600 TABLET, MULTILAYER, EXTENDED RELEASE ORAL at 20:15

## 2025-03-15 RX ADMIN — BENZONATATE 200 MG: 100 CAPSULE ORAL at 10:47

## 2025-03-15 RX ADMIN — POTASSIUM CHLORIDE 40 MEQ: 1500 TABLET, EXTENDED RELEASE ORAL at 08:34

## 2025-03-15 RX ADMIN — BENZONATATE 200 MG: 100 CAPSULE ORAL at 02:58

## 2025-03-15 RX ADMIN — MIDODRINE HYDROCHLORIDE 5 MG: 5 TABLET ORAL at 17:56

## 2025-03-15 RX ADMIN — MIDODRINE HYDROCHLORIDE 5 MG: 5 TABLET ORAL at 14:01

## 2025-03-15 RX ADMIN — POTASSIUM CHLORIDE 40 MEQ: 1500 TABLET, EXTENDED RELEASE ORAL at 12:28

## 2025-03-15 NOTE — CONSULTS
[facility]/ [facility]                                                        Nephrology Initial Consult Note    Patient Identification:  Name: Lacey Ramirez MRN: [id number]  Age: 69 y.o. : 1955  Sex: female  Date:3/15/2025    Requesting Physician: As per consult order.  Reason for Consultation: Acute kidney injury  Information from:patient/ family/ chart      History of Present Illness: This is a 69 y.o. year old female admitted with progressive shortness of breath and chest discomfort, patient recognized to have significant right-sided pleural effusion, plan for thoracocentesis noted.  Patient noted requiring supplemental oxygen, patient also noted to have bilateral pedal edema.  Patient carries medical diagnosis of liver cirrhosis, thrombocytopenia, obesity, hypertension.  Nephrology service was consulted after patient was noted to have elevated BUN and creatinine of 20 and 1.37.  Patient has a baseline creatinine around 0.8.  Patient says she is voiding.  Patient's albumin noted less than 2.    The following medical history and medications personally reviewed by me:    Problem List:     Pneumonia involving right lung    Benign essential hypertension    Hyperlipidemia    Transaminitis    Cirrhosis    Anemia    Thrombocytopenia    Pleural effusion      Past Medical History:  Past Medical History:   Diagnosis Date    Hyperlipidemia     Hypertension        Past Surgical History:  Past Surgical History:   Procedure Laterality Date    APPENDECTOMY      CHOLECYSTECTOMY      HYSTERECTOMY      TONSILLECTOMY          Home Meds:   Medications Prior to Admission   Medication Sig Dispense Refill Last Dose/Taking    Cholecalciferol (VITAMIN D) 1000 units tablet Take 2 tablets by mouth Daily. 60 tablet 6 3/14/2025 Morning    folic acid (FOLVITE) 800 MCG tablet Take 1 tablet by mouth Daily. 30 tablet 6 3/14/2025 Morning     furosemide (LASIX) 40 MG tablet Take 2 tablets by mouth Daily.   3/13/2025 Evening    lisinopril (PRINIVIL,ZESTRIL) 40 MG tablet TAKE ONE TABLET BY MOUTH DAILY 30 tablet 2 3/14/2025 Noon    pantoprazole (PROTONIX) 40 MG EC tablet Take 1 tablet by mouth Every Morning. 30 tablet 0 3/14/2025 Morning    vitamin B-12 (CYANOCOBALAMIN) 1000 MCG tablet Take 1 tablet by mouth Daily.   3/14/2025 Morning    vitamin E 400 UNIT capsule Take 1 capsule by mouth Daily.   3/14/2025 Morning    loratadine-pseudoephedrine (CLARITIN-D 24 HOUR)  MG per 24 hr tablet Take 1 tablet by mouth Daily. 30 tablet 5 Unknown       Current Meds:   Current Facility-Administered Medications   Medication Dose Route Frequency Provider Last Rate Last Admin    aluminum-magnesium hydroxide-simethicone (MAALOX MAX) 400-400-40 MG/5ML suspension 15 mL  15 mL Oral Q6H PRN Dayami Cisse APRN        aspirin tablet 325 mg  325 mg Oral Once Mahad Billy MD        benzonatate (TESSALON) capsule 200 mg  200 mg Oral TID PRN Dayami Cisse APRN   200 mg at 03/15/25 0258    sennosides-docusate (PERICOLACE) 8.6-50 MG per tablet 2 tablet  2 tablet Oral BID PRN Dayami Cisse APRN        And    polyethylene glycol (MIRALAX) packet 17 g  17 g Oral Daily PRN Dayami Cisse APRN        And    bisacodyl (DULCOLAX) EC tablet 5 mg  5 mg Oral Daily PRN Dayami Cisse APRN        And    bisacodyl (DULCOLAX) suppository 10 mg  10 mg Rectal Daily PRN Dayami Cisse APRN        Calcium Replacement - Follow Nurse / BPA Driven Protocol   Not Applicable PRN Refugio Rao MD        guaiFENesin (MUCINEX) 12 hr tablet 1,200 mg  1,200 mg Oral Q12H Dayami Cisse APRN   1,200 mg at 03/15/25 0834    Magnesium Standard Dose Replacement - Follow Nurse / BPA Driven Protocol   Not Applicable PRN Refugio Rao MD        melatonin tablet 5 mg  5 mg Oral Nightly PRN Dayami Cisse, HUBERT        nitroglycerin (NITROSTAT) SL  tablet 0.4 mg  0.4 mg Sublingual Q5 Min PRN Dayami Cisse APRN        ondansetron ODT (ZOFRAN-ODT) disintegrating tablet 4 mg  4 mg Oral Q6H PRN Dayami Cisse APRN        Or    ondansetron (ZOFRAN) injection 4 mg  4 mg Intravenous Q6H PRN Dayami Cisse APRN        Phosphorus Replacement - Follow Nurse / BPA Driven Protocol   Not Applicable PRN Refugio Rao MD        potassium chloride (KLOR-CON M20) CR tablet 40 mEq  40 mEq Oral Q4H Refugio Rao MD   40 mEq at 03/15/25 0834    Potassium Replacement - Follow Nurse / BPA Driven Protocol   Not Applicable PRN Refugio Rao MD        sodium chloride 0.9 % flush 10 mL  10 mL Intravenous PRN Mahad Billy MD        sodium chloride 0.9 % flush 10 mL  10 mL Intravenous PRN Dayami Cisse APRN           Allergies:  Allergies   Allergen Reactions    Propoxyphene Nausea And Vomiting    Benicar [Olmesartan] Swelling    Cataflam [Diclofenac] Unknown - High Severity    Fiorinal [Butalbital-Aspirin-Caffeine]     Propoxyphene-Acetaminophen        Social History:   Social History     Socioeconomic History    Marital status:    Tobacco Use    Smoking status: Never    Smokeless tobacco: Never   Vaping Use    Vaping status: Never Used   Substance and Sexual Activity    Alcohol use: No    Drug use: No    Sexual activity: Defer        Family History:  Family History   Problem Relation Age of Onset    Emphysema Mother     Heart disease Maternal Uncle     Alcohol abuse Maternal Uncle     Hypertension Maternal Grandmother     Arthritis Maternal Grandmother     Heart disease Maternal Grandfather         Review of Systems: as per HPI, in addition:    General:       no weakness / fatigue,                       No fevers / chills                       no weight loss  HEENT;       no dysphagia or visual changes  Neck:           normal range of motion, no swelling  Respiratory: no cough / congestion                      No shortness of air  "                      No wheezing  CV:              No chest pain                       No palpitations  Abdomen/GI: no nausea / vomiting                      No diarrhea / constipation                      No abdominal pain  :             no dysuria / urinary frequency                       No urgency, normal output  Endocrine:   no polyuria / polydipsia,                      No heat or cold intolerance  Skin:           no rashes or skin lesions   Vascular:   No edema  Musculoskeletal: no joint pain or deformities      Physical Exam:  Vitals:   Temp (24hrs), Av.7 °F (36.5 °C), Min:97.3 °F (36.3 °C), Max:98.1 °F (36.7 °C)    /60 (BP Location: Right arm, Patient Position: Lying)   Pulse 95   Temp 98.1 °F (36.7 °C) (Oral)   Resp 18   Ht 168.9 cm (66.5\")   Wt 114 kg (251 lb 6.4 oz)   LMP  (LMP Unknown)   SpO2 96%   BMI 39.97 kg/m²   Intake/Output:     Intake/Output Summary (Last 24 hours) at 3/15/2025 1027  Last data filed at 3/15/2025 0815  Gross per 24 hour   Intake 320 ml   Output --   Net 320 ml        Wt Readings from Last 1 Encounters:   25 2353 114 kg (251 lb 6.4 oz)   25 1708 111 kg (244 lb 11.4 oz)       Exam:    General Appearance:  Awake, alert, no acute distress  Good -appearing   Head and Face:  Normocephalic, atraumatic, mucous membranes moist, oropharynx clear   Eyes:  No icterus, pupils equal round and reactive to light, extraocular movements intact    ENMT: Moist mucosa, tongue symmetric    Neck: Supple  no jugular venous distention  no thyromegaly   Pulmonary:  Respiratory effort: Normal  Auscultation of lungs: Clear bilaterally  No wheezes  No rhonchi  Good air movement, good expansion   Chest wall:  No tenderness or deformity   Cardiovascular:  Auscultation of the heart: Normal rhythm, no murmurs  Positive edema of bilateral lower extremities   Abdomen:  Abdomen: soft, nontender, normal bowel sounds all four quadrants, no masses   Liver and spleen: no " hepatosplenomegaly   Musculoskeletal: Digits and nails: normal  Normal range of motion  No joint swelling or gross deformities    Skin: Skin inspection: color normal, no visible rashes or lesions  Skin palpation: texture, turgor normal, no palpable lesions   Lymphatic:  no cervical lymphadenopathy    Psychiatric: Judgement and insight: normal  Orientation to person place and time: normal  Mood and affect: normal       DATA:  Radiology and Labs:  The following labs independently reviewed by me, additional AM labs ordered  Old records independently reviewed showing   The following radiologic studies independently viewed by me, findings   Interval notes, chart personally reviewed by me.  I have reviewed and summarized old records as detailed above  Plan of care discussed with   New problems include   Dialysis patient access:     Risk/ complexity of medical care/ medical decision making:   Chronic illness with severe exacerbation or progression      Labs:   Recent Results (from the past 24 hours)   ECG 12 Lead ED Triage Standing Order; Chest Pain    Collection Time: 03/14/25  5:09 PM   Result Value Ref Range    QT Interval 390 ms    QTC Interval 514 ms   Comprehensive Metabolic Panel    Collection Time: 03/14/25  5:17 PM    Specimen: Arm, Left; Blood   Result Value Ref Range    Glucose 132 (H) 65 - 99 mg/dL    BUN 15 8 - 23 mg/dL    Creatinine 1.37 (H) 0.57 - 1.00 mg/dL    Sodium 138 136 - 145 mmol/L    Potassium 3.2 (L) 3.5 - 5.2 mmol/L    Chloride 98 98 - 107 mmol/L    CO2 30.9 (H) 22.0 - 29.0 mmol/L    Calcium 7.8 (L) 8.6 - 10.5 mg/dL    Total Protein 6.4 6.0 - 8.5 g/dL    Albumin 2.1 (L) 3.5 - 5.2 g/dL    ALT (SGPT) 34 (H) 1 - 33 U/L    AST (SGOT) 68 (H) 1 - 32 U/L    Alkaline Phosphatase 220 (H) 39 - 117 U/L    Total Bilirubin 4.5 (H) 0.0 - 1.2 mg/dL    Globulin 4.3 gm/dL    A/G Ratio 0.5 g/dL    BUN/Creatinine Ratio 10.9 7.0 - 25.0    Anion Gap 9.1 5.0 - 15.0 mmol/L    eGFR 42.1 (L) >60.0 mL/min/1.73   High  Sensitivity Troponin T    Collection Time: 03/14/25  5:17 PM    Specimen: Arm, Left; Blood   Result Value Ref Range    HS Troponin T 10 <14 ng/L   Green Top (Gel)    Collection Time: 03/14/25  5:17 PM   Result Value Ref Range    Extra Tube Hold for add-ons.    Lavender Top    Collection Time: 03/14/25  5:17 PM   Result Value Ref Range    Extra Tube hold for add-on    Gold Top - SST    Collection Time: 03/14/25  5:17 PM   Result Value Ref Range    Extra Tube Hold for add-ons.    Light Blue Top    Collection Time: 03/14/25  5:17 PM   Result Value Ref Range    Extra Tube Hold for add-ons.    CBC Auto Differential    Collection Time: 03/14/25  5:17 PM    Specimen: Arm, Left; Blood   Result Value Ref Range    WBC 10.21 3.40 - 10.80 10*3/mm3    RBC 3.78 3.77 - 5.28 10*6/mm3    Hemoglobin 13.0 12.0 - 15.9 g/dL    Hematocrit 36.0 34.0 - 46.6 %    MCV 95.2 79.0 - 97.0 fL    MCH 34.4 (H) 26.6 - 33.0 pg    MCHC 36.1 (H) 31.5 - 35.7 g/dL    RDW 14.5 12.3 - 15.4 %    RDW-SD 50.3 37.0 - 54.0 fl    MPV 10.2 6.0 - 12.0 fL    Platelets 142 140 - 450 10*3/mm3    Neutrophil % 75.5 42.7 - 76.0 %    Lymphocyte % 10.8 (L) 19.6 - 45.3 %    Monocyte % 11.5 5.0 - 12.0 %    Eosinophil % 1.0 0.3 - 6.2 %    Basophil % 0.7 0.0 - 1.5 %    Immature Grans % 0.5 0.0 - 0.5 %    Neutrophils, Absolute 7.72 (H) 1.70 - 7.00 10*3/mm3    Lymphocytes, Absolute 1.10 0.70 - 3.10 10*3/mm3    Monocytes, Absolute 1.17 (H) 0.10 - 0.90 10*3/mm3    Eosinophils, Absolute 0.10 0.00 - 0.40 10*3/mm3    Basophils, Absolute 0.07 0.00 - 0.20 10*3/mm3    Immature Grans, Absolute 0.05 0.00 - 0.05 10*3/mm3    nRBC 0.0 0.0 - 0.2 /100 WBC   BNP    Collection Time: 03/14/25  5:17 PM    Specimen: Arm, Left; Blood   Result Value Ref Range    proBNP 142.0 0.0 - 900.0 pg/mL   Procalcitonin    Collection Time: 03/14/25  5:17 PM    Specimen: Arm, Left; Blood   Result Value Ref Range    Procalcitonin 0.28 (H) 0.00 - 0.25 ng/mL   Lactic Acid, Plasma    Collection Time: 03/14/25  6:24  PM    Specimen: Arm, Right; Blood   Result Value Ref Range    Lactate 4.2 (C) 0.5 - 2.0 mmol/L   High Sensitivity Troponin T 1Hr    Collection Time: 03/14/25  6:24 PM    Specimen: Blood   Result Value Ref Range    HS Troponin T 10 <14 ng/L    Troponin T Numeric Delta 0 Abnormal if >/=3 ng/L   STAT Lactic Acid, Reflex    Collection Time: 03/15/25 12:23 AM    Specimen: Blood   Result Value Ref Range    Lactate 2.7 (C) 0.5 - 2.0 mmol/L   MRSA Screen, PCR (Inpatient) - Swab, Nares    Collection Time: 03/15/25 12:31 AM    Specimen: Nares; Swab   Result Value Ref Range    MRSA PCR No MRSA Detected No MRSA Detected   Legionella Antigen, Urine - Urine, Urine, Clean Catch    Collection Time: 03/15/25 12:58 AM    Specimen: Urine, Clean Catch   Result Value Ref Range    LEGIONELLA ANTIGEN, URINE Negative Negative   S. Pneumo Ag Urine or CSF - Urine, Urine, Clean Catch    Collection Time: 03/15/25 12:58 AM    Specimen: Urine, Clean Catch   Result Value Ref Range    Strep Pneumo Ag Negative Negative   Comprehensive Metabolic Panel    Collection Time: 03/15/25  3:20 AM    Specimen: Blood   Result Value Ref Range    Glucose 121 (H) 65 - 99 mg/dL    BUN 19 8 - 23 mg/dL    Creatinine 1.22 (H) 0.57 - 1.00 mg/dL    Sodium 133 (L) 136 - 145 mmol/L    Potassium 3.4 (L) 3.5 - 5.2 mmol/L    Chloride 96 (L) 98 - 107 mmol/L    CO2 28.4 22.0 - 29.0 mmol/L    Calcium 7.5 (L) 8.6 - 10.5 mg/dL    Total Protein 5.6 (L) 6.0 - 8.5 g/dL    Albumin 1.9 (L) 3.5 - 5.2 g/dL    ALT (SGPT) 28 1 - 33 U/L    AST (SGOT) 61 (H) 1 - 32 U/L    Alkaline Phosphatase 205 (H) 39 - 117 U/L    Total Bilirubin 3.7 (H) 0.0 - 1.2 mg/dL    Globulin 3.7 gm/dL    A/G Ratio 0.5 g/dL    BUN/Creatinine Ratio 15.6 7.0 - 25.0    Anion Gap 8.6 5.0 - 15.0 mmol/L    eGFR 48.4 (L) >60.0 mL/min/1.73   CBC (No Diff)    Collection Time: 03/15/25  3:20 AM    Specimen: Blood   Result Value Ref Range    WBC 10.55 3.40 - 10.80 10*3/mm3    RBC 3.37 (L) 3.77 - 5.28 10*6/mm3    Hemoglobin  11.5 (L) 12.0 - 15.9 g/dL    Hematocrit 31.6 (L) 34.0 - 46.6 %    MCV 93.8 79.0 - 97.0 fL    MCH 34.1 (H) 26.6 - 33.0 pg    MCHC 36.4 (H) 31.5 - 35.7 g/dL    RDW 13.7 12.3 - 15.4 %    RDW-SD 47.0 37.0 - 54.0 fl    MPV 10.6 6.0 - 12.0 fL    Platelets 128 (L) 140 - 450 10*3/mm3   STAT Lactic Acid, Reflex    Collection Time: 03/15/25  3:20 AM    Specimen: Blood   Result Value Ref Range    Lactate 2.4 (C) 0.5 - 2.0 mmol/L   Lactate Dehydrogenase    Collection Time: 03/15/25  3:20 AM    Specimen: Blood   Result Value Ref Range     (H) 135 - 214 U/L   CBC Auto Differential    Collection Time: 03/15/25  3:20 AM    Specimen: Blood   Result Value Ref Range    WBC 10.85 (H) 3.40 - 10.80 10*3/mm3    RBC 3.50 (L) 3.77 - 5.28 10*6/mm3    Hemoglobin 11.9 (L) 12.0 - 15.9 g/dL    Hematocrit 34.2 34.0 - 46.6 %    MCV 97.7 (H) 79.0 - 97.0 fL    MCH 34.0 (H) 26.6 - 33.0 pg    MCHC 34.8 31.5 - 35.7 g/dL    RDW 14.4 12.3 - 15.4 %    RDW-SD 51.5 37.0 - 54.0 fl    MPV 11.0 6.0 - 12.0 fL    Platelets 130 (L) 140 - 450 10*3/mm3    Neutrophil % 71.0 42.7 - 76.0 %    Lymphocyte % 16.8 (L) 19.6 - 45.3 %    Monocyte % 9.2 5.0 - 12.0 %    Eosinophil % 1.8 0.3 - 6.2 %    Basophil % 0.6 0.0 - 1.5 %    Immature Grans % 0.6 (H) 0.0 - 0.5 %    Neutrophils, Absolute 7.71 (H) 1.70 - 7.00 10*3/mm3    Lymphocytes, Absolute 1.82 0.70 - 3.10 10*3/mm3    Monocytes, Absolute 1.00 (H) 0.10 - 0.90 10*3/mm3    Eosinophils, Absolute 0.19 0.00 - 0.40 10*3/mm3    Basophils, Absolute 0.07 0.00 - 0.20 10*3/mm3    Immature Grans, Absolute 0.06 (H) 0.00 - 0.05 10*3/mm3    nRBC 0.2 0.0 - 0.2 /100 WBC   STAT Lactic Acid, Reflex    Collection Time: 03/15/25  6:29 AM    Specimen: Blood   Result Value Ref Range    Lactate 1.5 0.5 - 2.0 mmol/L   Basic Metabolic Panel    Collection Time: 03/15/25  6:29 AM    Specimen: Blood   Result Value Ref Range    Glucose 103 (H) 65 - 99 mg/dL    BUN 20 8 - 23 mg/dL    Creatinine 1.37 (H) 0.57 - 1.00 mg/dL    Sodium 133 (L) 136 -  145 mmol/L    Potassium 3.3 (L) 3.5 - 5.2 mmol/L    Chloride 96 (L) 98 - 107 mmol/L    CO2 29.8 (H) 22.0 - 29.0 mmol/L    Calcium 7.7 (L) 8.6 - 10.5 mg/dL    BUN/Creatinine Ratio 14.6 7.0 - 25.0    Anion Gap 7.2 5.0 - 15.0 mmol/L    eGFR 42.1 (L) >60.0 mL/min/1.73   Protein, Total    Collection Time: 03/15/25  6:29 AM    Specimen: Blood   Result Value Ref Range    Total Protein 5.8 (L) 6.0 - 8.5 g/dL       Radiology:  Imaging Results (Last 24 Hours)       Procedure Component Value Units Date/Time    CT Angiogram Chest [[id number]] Collected: 03/14/25 2105     Updated: 03/14/25 2114    Narrative:      CT ANGIOGRAM CHEST-     DATE OF EXAM: 3/14/2025 7:35 PM     INDICATION: Abnormal chest radiograph. Rule out PE.     COMPARISON: Chest radiographs 3/14/2025 and 2/23/2025. CT chest  2/24/2025.     TECHNIQUE: Multiple contiguous axial images were acquired through the  chest following the intravenous administration of 95 mL of Isovue-370.  Reformatted coronal, sagittal, and 3D reconstruction images were also  reviewed. Radiation dose reduction techniques were utilized, including  automated exposure control and exposure modulation based on body size.     FINDINGS:  No evidence of a pulmonary arterial filling defect to suggest pulmonary  embolism. The heart and great vessels of the chest are normal in size.  No definite calcified coronary artery disease. Trace pericardial fluid.  Calcified remnants of prior granulomatous disease in the mediastinum and  right hilum. No discrete pathologically enlarged intrathoracic lymph  nodes are identified.     Large right pleural effusion with complete or near complete  consolidation and volume loss of the right lower lobe and patchy  consolidation and volume loss in the base of the right upper lobe and  perihilar right middle lobe, likely superimposed pneumonia. Trace left  pleural effusion with mild multifocal left basilar subsegmental  atelectasis and/or scarring. The central airways  are widely patent. No  pneumothorax.     Limited imaging of the upper abdomen demonstrates apparent asymmetric  attenuation with nodular liver contours, indicating cirrhosis. Trace  perihepatic ascites. Venous varicosities in the upper abdomen and mild  splenomegaly with the spleen measuring 14.5 cm in diameter, likely  sequela of chronic portal hypertension.     Partially imaged chronic degenerative changes in both shoulders. No  acute osseous abnormality or concerning osseous lesion.       Impression:         1. The study is negative for pulmonary embolism.  2. Large right pleural effusion with complete or near complete  consolidation and volume loss of the right lower lobe and patchy  consolidation and volume loss in the base of the right upper lobe and  perihilar right middle lobe, likely superimposed pneumonia.  2. Trace left pleural effusion.  3. Partially imaged cirrhotic liver with evidence of portal venous  hypertension.     This report was finalized on 3/14/2025 9:11 PM by Corky Garrido MD on  Workstation: [id number]       XR Chest 1 View [[id number]] Collected: 03/14/25 1737     Updated: 03/14/25 1742    Narrative:      ONE-VIEW PORTABLE CHEST AT 5:38 P.M.     HISTORY: Chest pain. Cirrhosis.     FINDINGS: There is a large right pleural effusion including fluid  extending into the minor fissure and this is new since the study of  2/23/2025. I cannot completely exclude some superimposed pneumonia at  the right base. The left lung remains clear and the heart is borderline  enlarged without change.     This report was finalized on 3/14/2025 5:39 PM by Dr. Luis Fernando Hansen M.D  on Workstation: [id number]                    ASSESSMENT / PLAN    Acute kidney injury, may be representing hepatorenal versus component of intravascular volume depletion related to hypoalbuminemia.  Hepatorenal syndrome is a diagnosis of exclusion.    Plan  Check urinalysis, check urine sodium  Will give albumin 25%  50 g every 12 for  2 days ( 1 mg per Kg)  US renal to rule out hydro  Keep maps above at least 65, ideally around 75.  Avoid NSAID, avoid nephrotoxic medication  Dose medication for decreased GFR less than 40  Patient noted off diuretic therapy  Will check BMP later in the day.      Hypoxia requiring supplemental oxygen secondary to right-sided pleural effusion, pulmonary on board, patient will go for ultrasound-guided thoracocentesis    Liver cirrhosis with ascites /portal hypertension  Obesity  Thrombocytopenia  Prediabetes    Thanks for consult     Will follow the patient with you.      Continue to monitor electrolytes and volume closely, avoid IV contrast and nephrotoxic medications     I appreciate the consult request.  Please send me a secure chat message with any nonurgent questions regarding patient care.  For any urgent patient care issues please call my office number below.      Maricruz Aguilar MD  [facility]  Office phone number: [phone]  Answering service phone number: [phone]      3/15/2025        Dictation via Dragon dictation software

## 2025-03-15 NOTE — SIGNIFICANT NOTE
03/15/25 1523   OTHER   Discipline physical therapist   Rehab Time/Intention   Session Not Performed other (see comments)  (pt reported she is up adlib in the room and has no acute PT needs, PT will sign off)   Therapy Assessment/Plan (PT)   Criteria for Skilled Interventions Met (PT) no;no problems identified which require skilled intervention

## 2025-03-15 NOTE — PLAN OF CARE
Problem: Adult Inpatient Plan of Care  Goal: Plan of Care Review  Outcome: Progressing  Goal: Patient-Specific Goal (Individualized)  Outcome: Progressing  Goal: Absence of Hospital-Acquired Illness or Injury  Outcome: Progressing  Intervention: Identify and Manage Fall Risk  Recent Flowsheet Documentation  Taken 3/15/2025 1420 by Jay Jay Luz RN  Safety Promotion/Fall Prevention:   safety round/check completed   room organization consistent  Taken 3/15/2025 0815 by Jay Jay Luz RN  Safety Promotion/Fall Prevention:   safety round/check completed   room organization consistent  Intervention: Prevent Skin Injury  Recent Flowsheet Documentation  Taken 3/15/2025 1420 by Jay Jay Luz RN  Body Position:   sitting up in bed   position changed independently  Skin Protection:   transparent dressing maintained   incontinence pads utilized  Taken 3/15/2025 0815 by Jay Jay Luz RN  Body Position: position changed independently  Skin Protection:   transparent dressing maintained   incontinence pads utilized  Intervention: Prevent Infection  Recent Flowsheet Documentation  Taken 3/15/2025 1420 by Jay Jay Luz RN  Infection Prevention:   single patient room provided   rest/sleep promoted   hand hygiene promoted  Taken 3/15/2025 0815 by Jay Jay Luz RN  Infection Prevention:   single patient room provided   rest/sleep promoted   hand hygiene promoted  Goal: Optimal Comfort and Wellbeing  Outcome: Progressing  Intervention: Provide Person-Centered Care  Recent Flowsheet Documentation  Taken 3/15/2025 1420 by Jay Jay Luz RN  Trust Relationship/Rapport:   thoughts/feelings acknowledged   reassurance provided   questions encouraged   questions answered   empathic listening provided   emotional support provided   choices provided   care explained  Taken 3/15/2025 0815 by Jay Jay Luz RN Trust  Relationship/Rapport:   care explained   choices provided   emotional support provided   empathic listening provided   questions answered   questions encouraged   reassurance provided   thoughts/feelings acknowledged  Goal: Readiness for Transition of Care  Outcome: Progressing     Problem: Comorbidity Management  Goal: Blood Pressure in Desired Range  Outcome: Progressing  Intervention: Maintain Blood Pressure Management  Recent Flowsheet Documentation  Taken 3/15/2025 1420 by Jay Jay Luz, RN  Medication Review/Management: medications reviewed  Taken 3/15/2025 0815 by Jay Jay Luz, RN  Medication Review/Management: medications reviewed   Goal Outcome Evaluation:

## 2025-03-15 NOTE — H&P
Patient Name:  Lacey Ramirez  YOB: 1955  MRN:  [id number]  Admit Date:  3/14/2025  Patient Care Team:  Johanny Hall MD as PCP - General (Internal Medicine)      Subjective   History Present Illness     Chief Complaint   Patient presents with    Chest Pain     History of Present Illness  Ms. Ramirez is a 69 y.o. non-smoker with a history of benign essential hypertension, cirrhosis, HLD, thrombocytopenia, transaminitis obesity, and anemia that presents to [facility] complaining of chest pain and shortness of breath.  Patient was recently admitted to our service from 2/23 through 2/27 due to similar complaints.  Her chest pain was deemed noncardiac in nature.  She was noted to have mild to moderate ascites in which a paracentesis was attempted but not enough fluid was warranted.  GI was consulted and at that time and no intervention was warranted.  She is scheduled to have a EGD and colonoscopy with her primary GI at [facility] next month.  Workup today reveals a large right pleural effusion with complete or near complete consolidation and volume loss of the right lower lobe and patchy consolidation and volume loss in the base of the right upper lobe and  perihilar right middle lobe, likely superimposed pneumonia. We were asked admit for further treatment.    Review of Systems   Constitutional:  Negative for chills and fever.   HENT:  Negative for congestion and rhinorrhea.    Eyes:  Negative for photophobia and visual disturbance.   Respiratory:  Positive for cough and shortness of breath.    Cardiovascular:  Positive for chest pain. Negative for palpitations.   Gastrointestinal:  Negative for constipation, diarrhea, nausea and vomiting.   Endocrine: Negative for cold intolerance and heat intolerance.   Genitourinary:  Negative for difficulty urinating and dysuria.   Musculoskeletal:  Negative for gait problem and joint swelling.   Skin:  Negative for rash and wound.    Neurological:  Negative for dizziness, light-headedness and headaches.   Psychiatric/Behavioral:  Negative for sleep disturbance and suicidal ideas.         Personal History     Past Medical History:   Diagnosis Date    Hyperlipidemia     Hypertension      Past Surgical History:   Procedure Laterality Date    APPENDECTOMY      CHOLECYSTECTOMY      HYSTERECTOMY  1983    TONSILLECTOMY       Family History   Problem Relation Age of Onset    Emphysema Mother     Heart disease Maternal Uncle     Alcohol abuse Maternal Uncle     Hypertension Maternal Grandmother     Arthritis Maternal Grandmother     Heart disease Maternal Grandfather      Social History     Tobacco Use    Smoking status: Never    Smokeless tobacco: Never   Vaping Use    Vaping status: Never Used   Substance Use Topics    Alcohol use: No    Drug use: No     No current facility-administered medications on file prior to encounter.     Current Outpatient Medications on File Prior to Encounter   Medication Sig Dispense Refill    Cholecalciferol (VITAMIN D) 1000 units tablet Take 2 tablets by mouth Daily. 60 tablet 6    folic acid (FOLVITE) 800 MCG tablet Take 1 tablet by mouth Daily. 30 tablet 6    furosemide (LASIX) 40 MG tablet Take 2 tablets by mouth Daily.      lisinopril (PRINIVIL,ZESTRIL) 40 MG tablet TAKE ONE TABLET BY MOUTH DAILY 30 tablet 2    loratadine-pseudoephedrine (CLARITIN-D 24 HOUR)  MG per 24 hr tablet Take 1 tablet by mouth Daily. 30 tablet 5    pantoprazole (PROTONIX) 40 MG EC tablet Take 1 tablet by mouth Every Morning. 30 tablet 0    vitamin B-12 (CYANOCOBALAMIN) 1000 MCG tablet Take 1 tablet by mouth Daily.      vitamin E 400 UNIT capsule Take 1 capsule by mouth Daily.       Allergies   Allergen Reactions    Propoxyphene Nausea And Vomiting    Benicar [Olmesartan] Swelling    Cataflam [Diclofenac] Unknown - High Severity    Fiorinal [Butalbital-Aspirin-Caffeine]     Propoxyphene-Acetaminophen        Objective    Objective      Vital Signs  Temp:  [97.3 °F (36.3 °C)-97.5 °F (36.4 °C)] 97.3 °F (36.3 °C)  Heart Rate:  [] 91  Resp:  [16-18] 18  BP: ()/(41-68) 105/62  SpO2:  [92 %-95 %] 93 %  on   ;   Device (Oxygen Therapy): room air  Body mass index is 38.91 kg/m².    Physical Exam  Vitals and nursing note reviewed.   Constitutional:       General: She is not in acute distress.     Appearance: She is well-developed. She is obese. She is not toxic-appearing.   HENT:      Head: Normocephalic and atraumatic.   Eyes:      General: Scleral icterus present.         Right eye: No discharge.         Left eye: No discharge.      Conjunctiva/sclera: Conjunctivae normal.   Neck:      Vascular: No JVD.   Cardiovascular:      Rate and Rhythm: Regular rhythm.      Heart sounds: Normal heart sounds. No murmur heard.     No friction rub. No gallop.   Pulmonary:      Effort: Pulmonary effort is normal. No respiratory distress.      Breath sounds: Examination of the right-upper field reveals decreased breath sounds. Examination of the right-middle field reveals decreased breath sounds. Examination of the right-lower field reveals decreased breath sounds. Decreased breath sounds present. No wheezing or rales.   Abdominal:      General: Bowel sounds are normal. There is no distension.      Palpations: Abdomen is soft.      Tenderness: There is no abdominal tenderness. There is no guarding.   Musculoskeletal:         General: No tenderness or deformity. Normal range of motion.      Cervical back: Normal range of motion and neck supple.   Skin:     General: Skin is warm and dry.      Capillary Refill: Capillary refill takes less than 2 seconds.   Neurological:      Mental Status: She is alert and oriented to person, place, and time.   Psychiatric:         Behavior: Behavior normal.     Results Review:  I reviewed the patient's new clinical results.  I reviewed the patient's new imaging results and agree with the interpretation.  I reviewed the  patient's other test results and agree with the interpretation  I personally viewed and interpreted the patient's EKG/Telemetry data    Lab Results (last 24 hours)       Procedure Component Value Units Date/Time    CBC & Differential [[id number]]  (Abnormal) Collected: 03/14/25 1717    Specimen: Blood from Arm, Left Updated: 03/14/25 1732    Narrative:      The following orders were created for panel order CBC & Differential.  Procedure                               Abnormality         Status                     ---------                               -----------         ------                     CBC Auto Differential[[id number]]        Abnormal            Final result                 Please view results for these tests on the individual orders.    Comprehensive Metabolic Panel [[id number]]  (Abnormal) Collected: 03/14/25 1717    Specimen: Blood from Arm, Left Updated: 03/14/25 1757     Glucose 132 mg/dL      BUN 15 mg/dL      Creatinine 1.37 mg/dL      Sodium 138 mmol/L      Potassium 3.2 mmol/L      Chloride 98 mmol/L      CO2 30.9 mmol/L      Calcium 7.8 mg/dL      Total Protein 6.4 g/dL      Albumin 2.1 g/dL      ALT (SGPT) 34 U/L      AST (SGOT) 68 U/L      Alkaline Phosphatase 220 U/L      Total Bilirubin 4.5 mg/dL      Globulin 4.3 gm/dL      A/G Ratio 0.5 g/dL      BUN/Creatinine Ratio 10.9     Anion Gap 9.1 mmol/L      eGFR 42.1 mL/min/1.73     Narrative:      GFR Categories in Chronic Kidney Disease (CKD)      GFR Category          GFR (mL/min/1.73)    Interpretation  G1                     90 or greater         Normal or high (1)  G2                      60-89                Mild decrease (1)  G3a                   45-59                Mild to moderate decrease  G3b                   30-44                Moderate to severe decrease  G4                    15-29                Severe decrease  G5                    14 or less           Kidney failure          (1)In the absence of evidence of kidney disease,  neither GFR category G1 or G2 fulfill the criteria for CKD.    eGFR calculation 2021 CKD-EPI creatinine equation, which does not include race as a factor    High Sensitivity Troponin T [[id number]]  (Normal) Collected: 03/14/25 1717    Specimen: Blood from Arm, Left Updated: 03/14/25 1757     HS Troponin T 10 ng/L     Narrative:      High Sensitive Troponin T Reference Range:  <14.0 ng/L- Negative Female for AMI  <22.0 ng/L- Negative Male for AMI  >=14 - Abnormal Female indicating possible myocardial injury.  >=22 - Abnormal Male indicating possible myocardial injury.   Clinicians would have to utilize clinical acumen, EKG, Troponin, and serial changes to determine if it is an Acute Myocardial Infarction or myocardial injury due to an underlying chronic condition.         CBC Auto Differential [[id number]]  (Abnormal) Collected: 03/14/25 1717    Specimen: Blood from Arm, Left Updated: 03/14/25 1732     WBC 10.21 10*3/mm3      RBC 3.78 10*6/mm3      Hemoglobin 13.0 g/dL      Hematocrit 36.0 %      MCV 95.2 fL      MCH 34.4 pg      MCHC 36.1 g/dL      RDW 14.5 %      RDW-SD 50.3 fl      MPV 10.2 fL      Platelets 142 10*3/mm3      Neutrophil % 75.5 %      Lymphocyte % 10.8 %      Monocyte % 11.5 %      Eosinophil % 1.0 %      Basophil % 0.7 %      Immature Grans % 0.5 %      Neutrophils, Absolute 7.72 10*3/mm3      Lymphocytes, Absolute 1.10 10*3/mm3      Monocytes, Absolute 1.17 10*3/mm3      Eosinophils, Absolute 0.10 10*3/mm3      Basophils, Absolute 0.07 10*3/mm3      Immature Grans, Absolute 0.05 10*3/mm3      nRBC 0.0 /100 WBC     BNP [[id number]]  (Normal) Collected: 03/14/25 1717    Specimen: Blood from Arm, Left Updated: 03/14/25 1809     proBNP 142.0 pg/mL     Narrative:      This assay is used as an aid in the diagnosis of individuals suspected of having heart failure. It can be used as an aid in the diagnosis of acute decompensated heart failure (ADHF) in patients presenting with signs and symptoms of ADHF to  "the emergency department (ED). In addition, NT-proBNP of <300 pg/mL indicates ADHF is not likely.    Age Range Result Interpretation  NT-proBNP Concentration (pg/mL:      <50             Positive            >450                   Gray                 300-450                    Negative             <300    50-75           Positive            >900                  Gray                300-900                  Negative            <300      >75             Positive            >1800                  Gray                300-1800                  Negative            <300    Procalcitonin [[id number]]  (Abnormal) Collected: 03/14/25 1717    Specimen: Blood from Arm, Left Updated: 03/14/25 1833     Procalcitonin 0.28 ng/mL     Narrative:      As a Marker for Sepsis (Non-Neonates):    1. <0.5 ng/mL represents a low risk of severe sepsis and/or septic shock.  2. >2 ng/mL represents a high risk of severe sepsis and/or septic shock.    As a Marker for Lower Respiratory Tract Infections that require antibiotic therapy:    PCT on Admission    Antibiotic Therapy       6-12 Hrs later    >0.5                Strongly Recommended  >0.25 - <0.5        Recommended   0.1 - 0.25          Discouraged              Remeasure/reassess PCT  <0.1                Strongly Discouraged     Remeasure/reassess PCT    As 28 day mortality risk marker: \"Change in Procalcitonin Result\" (>80% or <=80%) if Day 0 (or Day 1) and Day 4 values are available. Refer to http://www.[employer]-pct-calculator.com    Change in PCT <=80%  A decrease of PCT levels below or equal to 80% defines a positive change in PCT test result representing a higher risk for 28-day all-cause mortality of patients diagnosed with severe sepsis for septic shock.    Change in PCT >80%  A decrease of PCT levels of more than 80% defines a negative change in PCT result representing a lower risk for 28-day all-cause mortality of patients diagnosed with severe sepsis or septic shock.       Lactic " Acid, Plasma [[id number]]  (Abnormal) Collected: 03/14/25 1824    Specimen: Blood from Arm, Right Updated: 03/14/25 1855     Lactate 4.2 mmol/L     Blood Culture - Blood, Arm, Right [[id number]] Collected: 03/14/25 1824    Specimen: Blood from Arm, Right Updated: 03/14/25 1829    Blood Culture - Blood, Arm, Right [[id number]] Collected: 03/14/25 1824    Specimen: Blood from Arm, Right Updated: 03/14/25 1828    High Sensitivity Troponin T 1Hr [[id number]]  (Normal) Collected: 03/14/25 1824    Specimen: Blood Updated: 03/14/25 1855     HS Troponin T 10 ng/L      Troponin T Numeric Delta 0 ng/L     Narrative:      High Sensitive Troponin T Reference Range:  <14.0 ng/L- Negative Female for AMI  <22.0 ng/L- Negative Male for AMI  >=14 - Abnormal Female indicating possible myocardial injury.  >=22 - Abnormal Male indicating possible myocardial injury.   Clinicians would have to utilize clinical acumen, EKG, Troponin, and serial changes to determine if it is an Acute Myocardial Infarction or myocardial injury due to an underlying chronic condition.                 Imaging Results (Last 24 Hours)       Procedure Component Value Units Date/Time    CT Angiogram Chest [[id number]] Collected: 03/14/25 2105     Updated: 03/14/25 2114    Narrative:      CT ANGIOGRAM CHEST-     DATE OF EXAM: 3/14/2025 7:35 PM     INDICATION: Abnormal chest radiograph. Rule out PE.     COMPARISON: Chest radiographs 3/14/2025 and 2/23/2025. CT chest  2/24/2025.     TECHNIQUE: Multiple contiguous axial images were acquired through the  chest following the intravenous administration of 95 mL of Isovue-370.  Reformatted coronal, sagittal, and 3D reconstruction images were also  reviewed. Radiation dose reduction techniques were utilized, including  automated exposure control and exposure modulation based on body size.     FINDINGS:  No evidence of a pulmonary arterial filling defect to suggest pulmonary  embolism. The heart and great vessels of the chest  are normal in size.  No definite calcified coronary artery disease. Trace pericardial fluid.  Calcified remnants of prior granulomatous disease in the mediastinum and  right hilum. No discrete pathologically enlarged intrathoracic lymph  nodes are identified.     Large right pleural effusion with complete or near complete  consolidation and volume loss of the right lower lobe and patchy  consolidation and volume loss in the base of the right upper lobe and  perihilar right middle lobe, likely superimposed pneumonia. Trace left  pleural effusion with mild multifocal left basilar subsegmental  atelectasis and/or scarring. The central airways are widely patent. No  pneumothorax.     Limited imaging of the upper abdomen demonstrates apparent asymmetric  attenuation with nodular liver contours, indicating cirrhosis. Trace  perihepatic ascites. Venous varicosities in the upper abdomen and mild  splenomegaly with the spleen measuring 14.5 cm in diameter, likely  sequela of chronic portal hypertension.     Partially imaged chronic degenerative changes in both shoulders. No  acute osseous abnormality or concerning osseous lesion.       Impression:         1. The study is negative for pulmonary embolism.  2. Large right pleural effusion with complete or near complete  consolidation and volume loss of the right lower lobe and patchy  consolidation and volume loss in the base of the right upper lobe and  perihilar right middle lobe, likely superimposed pneumonia.  2. Trace left pleural effusion.  3. Partially imaged cirrhotic liver with evidence of portal venous  hypertension.     This report was finalized on 3/14/2025 9:11 PM by Corky Garrido MD on  Workstation: [id number]       XR Chest 1 View [[id number]] Collected: 03/14/25 1737     Updated: 03/14/25 1742    Narrative:      ONE-VIEW PORTABLE CHEST AT 5:38 P.M.     HISTORY: Chest pain. Cirrhosis.     FINDINGS: There is a large right pleural effusion including  fluid  extending into the minor fissure and this is new since the study of  2/23/2025. I cannot completely exclude some superimposed pneumonia at  the right base. The left lung remains clear and the heart is borderline  enlarged without change.     This report was finalized on 3/14/2025 5:39 PM by Dr. Luis Fernando Hansen M.D  on Workstation: [id number]                   ECG 12 Lead ED Triage Standing Order; Chest Pain   Final Result   HEART NLLP=449  bpm   RR Btdekbit=775  ms   OR Ylfbxody=400  ms   P Horizontal Axis=20  deg   P Front Axis=30  deg   QRSD Interval=98  ms   QT Pivncymt=913  ms   JSwZ=784  ms   QRS Axis=75  deg   T Wave Axis=37  deg   - ABNORMAL ECG -   Sinus tachycardia   Low voltage, precordial leads   Prolonged QT interval   When compared with ECG of 23-Feb-2025 20:13:51,   No significant change   Electronically Signed By: Fredi Torres ([facility]) 2025-03-14 22:18:05   Date and Time of Study:2025-03-14 17:09:27           Assessment/Plan     Active Hospital Problems    Diagnosis  POA    **Pneumonia involving right lung [J18.9]  Yes    Pleural effusion [J90]  Yes    Cirrhosis [K74.60]  Yes    Thrombocytopenia [D69.6]  Yes    Transaminitis [R74.01]  Yes    Anemia [D64.9]  Yes    Benign essential hypertension [I10]  Yes    Hyperlipidemia [E78.5]  Yes      Resolved Hospital Problems   No resolved problems to display.       Ms. Ramirez is a 69 y.o. non-smoker with a history of cirrhosis, HTN, HLD who presents with chest pain.    Pneumonia  Large right pleural effusion   -Lactate 4.2, Pro-Harsha 0.28, white count 10.  She does endorse a nonproductive cough and shortness of breath.  Given Rocephin and vancomycin in ED, will continue  -Blood cultures pending  -Check urine for Legionella and S pneumoniae   -Check for MRSA swab of nares   -Add mucolytics and antitussive  -Continue supplemental O2 as needed  -Pulmonary consultation    Cirrhosis   Thrombocytopenia  Transaminates   -Followed by Virgil JARA.  EGD and colonoscopy  scheduled for April 2025.  Platelets today stable.    ELTON  -Nephrology consulted  -Avoid nephrotoxins  -Trend BMP    Essential hypertension  -Pressure soft in ED, asymptomatic.  Will hold lisinopril    Prediabetes with hyperglycemia  - Prior A1c in 2024 elevated at 5.70%, repeat here <4.30%.      I discussed the patient's findings and my recommendations with patient and ED provider.    VTE Prophylaxis - SCDs.  Code Status - Full code.       HUBERT Calabrese  [facility]  03/14/25  23:49 EDT

## 2025-03-15 NOTE — ED NOTES
Nursing report ED to floor  Lacey Ramirez  69 y.o.  female    Lacey Ramirez is a 69 y.o. female who presents to the ED c/o the symptoms started last night.  Felt like there was chest pain that was starting in the right breast and went across to the left side of the chest.  She describes it as an ache.  She states that it is worse with cough she has a mild cough.  It is not worse with exertion.  She was recently in the hospital for chest pain and she says that this seems a little bit different than that chest pain.  She has had no nausea or vomiting.  She has had some shortness of breath for the past couple of days.  She also does report some chronic shortness of breath with exertion.  She denies any new swelling to her abdomen.  She is aware that she does have cirrhosis and chronic liver failure.  Also denies any new swelling to her lower extremities.  When she was recently in the hospital did have colitis she did complete her Levaquin and Flagyl.  She has no abdominal pain at all.  She denies any fevers or chills.  Denies any headache.  Denies any focal weakness to arms or legs.  She went to the urgent care center and the urgent care center referred her here to our emergency department.       HPI :  HPI  Stated Reason for Visit: chest pain  History Obtained From: patient  Duration (Days): 2    Chief Complaint  Chief Complaint   Patient presents with    Chest Pain       Admitting doctor:   Tien Smith MD    Admitting diagnosis:   The primary encounter diagnosis was Pleural effusion: Right chest and large. Diagnoses of Chest pain, unspecified type, Dyspnea, unspecified type, Transaminitis, and Acute kidney injury were also pertinent to this visit.    Code status:   Current Code Status       Date Active Code Status Order ID Comments User Context       3/14/2025 2004 CPR (Attempt to Resuscitate) [id number]  Dayami Cisse, APRN ED        Question Answer    Code Status (Patient has no pulse and is not  breathing) CPR (Attempt to Resuscitate)    Medical Interventions (Patient has pulse or is breathing) Full Support                    Allergies:   Propoxyphene, Benicar [olmesartan], Cataflam [diclofenac], Fiorinal [butalbital-aspirin-caffeine], and Propoxyphene-acetaminophen    Isolation:   No active isolations    Intake and Output    Intake/Output Summary (Last 24 hours) at 3/14/2025 2122  Last data filed at 3/14/2025 2121  Gross per 24 hour   Intake 100 ml   Output --   Net 100 ml       Weight:       03/14/25  1708   Weight: 111 kg (244 lb 11.4 oz)       Most recent vitals:   Vitals:    03/14/25 1848 03/14/25 1901 03/14/25 2017 03/14/25 2031   BP: 102/59 108/41 91/46 106/41   BP Location:       Patient Position:       Pulse: 100 98 96 96   Resp:       Temp:       SpO2: 92% 93% 94% 95%   Weight:       Height:           Active LDAs/IV Access:   Lines, Drains & Airways       Active LDAs       Name Placement date Placement time Site Days    Peripheral IV 03/14/25 1751 Left;Posterior Forearm 03/14/25 1751  Forearm  less than 1                    Labs (abnormal labs have a star):   Labs Reviewed   COMPREHENSIVE METABOLIC PANEL - Abnormal; Notable for the following components:       Result Value    Glucose 132 (*)     Creatinine 1.37 (*)     Potassium 3.2 (*)     CO2 30.9 (*)     Calcium 7.8 (*)     Albumin 2.1 (*)     ALT (SGPT) 34 (*)     AST (SGOT) 68 (*)     Alkaline Phosphatase 220 (*)     Total Bilirubin 4.5 (*)     eGFR 42.1 (*)     All other components within normal limits    Narrative:     GFR Categories in Chronic Kidney Disease (CKD)      GFR Category          GFR (mL/min/1.73)    Interpretation  G1                     90 or greater         Normal or high (1)  G2                      60-89                Mild decrease (1)  G3a                   45-59                Mild to moderate decrease  G3b                   30-44                Moderate to severe decrease  G4                    15-29                 "Severe decrease  G5                    14 or less           Kidney failure          (1)In the absence of evidence of kidney disease, neither GFR category G1 or G2 fulfill the criteria for CKD.    eGFR calculation 2021 CKD-EPI creatinine equation, which does not include race as a factor   CBC WITH AUTO DIFFERENTIAL - Abnormal; Notable for the following components:    MCH 34.4 (*)     MCHC 36.1 (*)     Lymphocyte % 10.8 (*)     Neutrophils, Absolute 7.72 (*)     Monocytes, Absolute 1.17 (*)     All other components within normal limits   PROCALCITONIN - Abnormal; Notable for the following components:    Procalcitonin 0.28 (*)     All other components within normal limits    Narrative:     As a Marker for Sepsis (Non-Neonates):    1. <0.5 ng/mL represents a low risk of severe sepsis and/or septic shock.  2. >2 ng/mL represents a high risk of severe sepsis and/or septic shock.    As a Marker for Lower Respiratory Tract Infections that require antibiotic therapy:    PCT on Admission    Antibiotic Therapy       6-12 Hrs later    >0.5                Strongly Recommended  >0.25 - <0.5        Recommended   0.1 - 0.25          Discouraged              Remeasure/reassess PCT  <0.1                Strongly Discouraged     Remeasure/reassess PCT    As 28 day mortality risk marker: \"Change in Procalcitonin Result\" (>80% or <=80%) if Day 0 (or Day 1) and Day 4 values are available. Refer to http://www.[facility]-pct-calculator.com    Change in PCT <=80%  A decrease of PCT levels below or equal to 80% defines a positive change in PCT test result representing a higher risk for 28-day all-cause mortality of patients diagnosed with severe sepsis for septic shock.    Change in PCT >80%  A decrease of PCT levels of more than 80% defines a negative change in PCT result representing a lower risk for 28-day all-cause mortality of patients diagnosed with severe sepsis or septic shock.      LACTIC ACID, PLASMA - Abnormal; Notable for the " following components:    Lactate 4.2 (*)     All other components within normal limits   TROPONIN - Normal    Narrative:     High Sensitive Troponin T Reference Range:  <14.0 ng/L- Negative Female for AMI  <22.0 ng/L- Negative Male for AMI  >=14 - Abnormal Female indicating possible myocardial injury.  >=22 - Abnormal Male indicating possible myocardial injury.   Clinicians would have to utilize clinical acumen, EKG, Troponin, and serial changes to determine if it is an Acute Myocardial Infarction or myocardial injury due to an underlying chronic condition.        BNP (IN-HOUSE) - Normal    Narrative:     This assay is used as an aid in the diagnosis of individuals suspected of having heart failure. It can be used as an aid in the diagnosis of acute decompensated heart failure (ADHF) in patients presenting with signs and symptoms of ADHF to the emergency department (ED). In addition, NT-proBNP of <300 pg/mL indicates ADHF is not likely.    Age Range Result Interpretation  NT-proBNP Concentration (pg/mL:      <50             Positive            >450                   Gray                 300-450                    Negative             <300    50-75           Positive            >900                  Gray                300-900                  Negative            <300      >75             Positive            >1800                  Gray                300-1800                  Negative            <300   HIGH SENSITIVITIY TROPONIN T 1HR - Normal    Narrative:     High Sensitive Troponin T Reference Range:  <14.0 ng/L- Negative Female for AMI  <22.0 ng/L- Negative Male for AMI  >=14 - Abnormal Female indicating possible myocardial injury.  >=22 - Abnormal Male indicating possible myocardial injury.   Clinicians would have to utilize clinical acumen, EKG, Troponin, and serial changes to determine if it is an Acute Myocardial Infarction or myocardial injury due to an underlying chronic condition.        BLOOD CULTURE    BLOOD CULTURE   RAINBOW DRAW    Narrative:     The following orders were created for panel order [facility] Draw.  Procedure                               Abnormality         Status                     ---------                               -----------         ------                     Green Top (Gel)[[id number]]                                  Final result               Lavender Top[[id number]]                                     Final result               Gold Top - SST[[id number]]                                   Final result               Light Blue Top[[id number]]                                   Final result                 Please view results for these tests on the individual orders.   LACTIC ACID, REFLEX   CBC AND DIFFERENTIAL    Narrative:     The following orders were created for panel order CBC & Differential.  Procedure                               Abnormality         Status                     ---------                               -----------         ------                     CBC Auto Differential[[id number]]        Abnormal            Final result                 Please view results for these tests on the individual orders.   GREEN TOP   LAVENDER TOP   GOLD TOP - SST   LIGHT BLUE TOP       EKG:   ECG 12 Lead ED Triage Standing Order; Chest Pain   Preliminary Result   HEART PFNT=781  bpm   RR Aoayikxv=038  ms   FL Oaylmima=030  ms   P Horizontal Axis=20  deg   P Front Axis=30  deg   QRSD Interval=98  ms   QT Qwfsxivt=808  ms   JDoY=768  ms   QRS Axis=75  deg   T Wave Axis=37  deg   - ABNORMAL ECG -   Sinus tachycardia   Low voltage, precordial leads   Prolonged QT interval   Date and Time of Study:2025-03-14 17:09:27          Meds given in ED:   Medications   sodium chloride 0.9 % flush 10 mL (has no administration in time range)   aspirin tablet 325 mg (0 mg Oral Hold 3/14/25 2025)   sodium chloride 0.9 % flush 10 mL (has no administration in time range)   nitroglycerin (NITROSTAT) SL tablet 0.4 mg  (has no administration in time range)   melatonin tablet 5 mg (has no administration in time range)   sennosides-docusate (PERICOLACE) 8.6-50 MG per tablet 2 tablet (has no administration in time range)     And   polyethylene glycol (MIRALAX) packet 17 g (has no administration in time range)     And   bisacodyl (DULCOLAX) EC tablet 5 mg (has no administration in time range)     And   bisacodyl (DULCOLAX) suppository 10 mg (has no administration in time range)   ondansetron ODT (ZOFRAN-ODT) disintegrating tablet 4 mg (has no administration in time range)     Or   ondansetron (ZOFRAN) injection 4 mg (has no administration in time range)   aluminum-magnesium hydroxide-simethicone (MAALOX MAX) 400-400-40 MG/5ML suspension 15 mL (has no administration in time range)   metroNIDAZOLE (FLAGYL) IVPB 500 mg (has no administration in time range)   vancomycin 2250 mg/500 mL 0.9% NS IVPB ([facility]) (has no administration in time range)   cefTRIAXone (ROCEPHIN) 2,000 mg in sodium chloride 0.9 % 100 mL MBP (0 mg Intravenous Stopped 3/14/25 2121)   iopamidol (ISOVUE-370) 76 % injection 100 mL (95 mL Intravenous Given by Other 3/14/25 1954)       Imaging results:  CT Angiogram Chest  Result Date: 3/14/2025   1. The study is negative for pulmonary embolism. 2. Large right pleural effusion with complete or near complete consolidation and volume loss of the right lower lobe and patchy consolidation and volume loss in the base of the right upper lobe and perihilar right middle lobe, likely superimposed pneumonia. 2. Trace left pleural effusion. 3. Partially imaged cirrhotic liver with evidence of portal venous hypertension.  This report was finalized on 3/14/2025 9:11 PM by Corky Garrido MD on Workstation: [id number]        Ambulatory status:   - stand by    Social issues:   Social History     Socioeconomic History    Marital status:    Tobacco Use    Smoking status: Never    Smokeless tobacco: Never   Vaping Use    Vaping status:  Never Used   Substance and Sexual Activity    Alcohol use: No    Drug use: No    Sexual activity: Defer       Peripheral Neurovascular  Peripheral Neurovascular (Adult)  Peripheral Neurovascular WDL: neurovascular assessment upper, neurovascular assessment lower, .WDL except  Additional Documentation: Edema (Group)  Edema  Edema: ankle, left, ankle, right, arm, right, arm, left  Arm, Left Edema: 2+ (Mild)  Arm, Right Edema: 2+ (Mild)  Ankle, Left Edema: 3+ (Moderate)  Ankle, Right Edema: 3+ (Moderate)  LUE Neurovascular Assessment  Temperature LUE: warm  Color LUE: no discoloration  Sensation LUE: no tenderness, no tingling, no numbness  RUE Neurovascular Assessment  Temperature RUE: warm  Color RUE: no discoloration  Sensation RUE: no numbness, no tenderness, no tingling  LLE Neurovascular Assessment  Temperature LLE: warm  Color LLE: no discoloration  Sensation LLE: no numbness, no tenderness, no tingling  RLE Neurovascular Assessment  Temperature RLE: warm  Color RLE: no discoloration  Sensation RLE: no numbness, no tenderness, no tingling    Neuro Cognitive  Neuro Cognitive (Adult)  Cognitive/Neuro/Behavioral WDL: .WDL except, orientation  Orientation: oriented x 4    Learning  Learning Assessment  Learning Readiness and Ability: no barriers identified  Education Provided  Person Taught: patient  Teaching Method: verbal instruction  Teaching Focus: symptom/problem overview  Education Outcome Evaluation: eager to learn, acceptance expressed, verbalizes understanding    Respiratory  Respiratory WDL  Respiratory WDL: .WDL except, rhythm/pattern  Rhythm/Pattern, Respiratory: shortness of breath    Abdominal Pain       Pain Assessments  Pain (Adult)  (0-10) Pain Rating: Rest: 7  (0-10) Pain Rating: Activity: 8  Pain Location: chest    NIH Stroke Scale       Carlitos Mccarty RN  03/14/25 21:22 EDT

## 2025-03-15 NOTE — CONSULTS
Group: [facility]         CONSULT NOTE    Patient Identification:  Lacey Ramirez  69 y.o.  female  1955  [id number]            Requesting physician: Dr. Smith    Reason for Consultation: Large pleural effusion    CC: Chest pain, shortness of breath    History of Present Illness:  Lacey Ramirez is a 69-year-old female with a medical history including hypertension, cirrhosis, hyperlipidemia, thrombocytopenia, transaminitis, obesity, and anemia.    Patient was recently admitted from 2/23 to 2/27 with complaints of chest discomfort.  Patient was treated for acute colitis with Zosyn, subsequently Levaquin and Flagyl.  She was diagnosed with cirrhosis, noted on imaging with mild to moderate ascites.  GI consulted and no acute intervention was warranted.  A paracentesis was attempted during hospitalization, however there was not enough fluid to warrant drainage.  Plan was for patient to follow-up with GI team at [place] for EGD/colonoscopy in April.    She returned to the emergency department on 3/14/2025 with complaints of chest pain and shortness of breath.  Patient reports shortness of breath with exertion and minimal activity, improved with rest.  Describes chest pain as across her anterior chest, worse with coughing and deep breathing.  Patient reports incessant, nonproductive cough.  No fever, chills or sick contacts.  No nausea, vomiting or diarrhea.  Patient endorses compliance with her home medication regimen including diuretics and salt restrictions.    ED evaluation revealed: High-sensitivity troponin 10, reflex 10, proBNP 142, creatinine 1.37, AST 68, ALT 34, total bilirubin 4.5, procalcitonin 0.28, WBC 10.21 with no evidence of left shift.  Lactate 4.2, reflex 2.7.  CTA chest was performed which showed no evidence of pulmonary embolism but did show a large right pleural effusion with complete or near complete consolidation and volume loss of the right lower lobe and patchy  consolidation and volume loss in the base of the right upper lobe.    Patient was admitted due to concern for pneumonia, pulmonary was asked to see patient to evaluate this pleural effusion and for consideration of thoracentesis.    Review of Systems:  CONSTITUTIONAL:  Denies fevers or chills  EYE:  No new vision changes  EAR:  No change in hearing  CARDIAC: + Right sided chest pain, described as squeezing with radiation across anterior chest  PULMONARY:  No productive cough, + nonproductive cough and shortness of breath  GI:  No diarrhea, hematemesis or hematochezia  RENAL:  No dysuria or urinary frequency  MUSCULOSKELETAL:  No musculoskeletal complaints  ENDOCRINE:  No heat or cold intolerance  INTEGUMENTARY: No skin rashes  NEUROLOGICAL:  No dizziness or confusion.  No seizure activity  PSYCHIATRIC:  No new anxiety or depression  12 system review of systems performed and all else negative     Past Medical History:  Past Medical History:   Diagnosis Date    Hyperlipidemia     Hypertension        Past Surgical History:  Past Surgical History:   Procedure Laterality Date    APPENDECTOMY      CHOLECYSTECTOMY      HYSTERECTOMY  1983    TONSILLECTOMY          Home Meds:  Medications Prior to Admission   Medication Sig Dispense Refill Last Dose/Taking    Cholecalciferol (VITAMIN D) 1000 units tablet Take 2 tablets by mouth Daily. 60 tablet 6 3/14/2025 Morning    folic acid (FOLVITE) 800 MCG tablet Take 1 tablet by mouth Daily. 30 tablet 6 3/14/2025 Morning    furosemide (LASIX) 40 MG tablet Take 2 tablets by mouth Daily.   3/13/2025 Evening    lisinopril (PRINIVIL,ZESTRIL) 40 MG tablet TAKE ONE TABLET BY MOUTH DAILY 30 tablet 2 3/14/2025 Noon    loratadine-pseudoephedrine (CLARITIN-D 24 HOUR)  MG per 24 hr tablet Take 1 tablet by mouth Daily. 30 tablet 5 Unknown    pantoprazole (PROTONIX) 40 MG EC tablet Take 1 tablet by mouth Every Morning. 30 tablet 0 3/14/2025 Morning    vitamin B-12 (CYANOCOBALAMIN) 1000 MCG  "tablet Take 1 tablet by mouth Daily.   3/14/2025 Morning    vitamin E 400 UNIT capsule Take 1 capsule by mouth Daily.   3/14/2025 Morning       Allergies:  Allergies   Allergen Reactions    Propoxyphene Nausea And Vomiting    Benicar [Olmesartan] Swelling    Cataflam [Diclofenac] Unknown - High Severity    Fiorinal [Butalbital-Aspirin-Caffeine]     Propoxyphene-Acetaminophen        Social History:   Social History     Socioeconomic History    Marital status:    Tobacco Use    Smoking status: Never    Smokeless tobacco: Never   Vaping Use    Vaping status: Never Used   Substance and Sexual Activity    Alcohol use: No    Drug use: No    Sexual activity: Defer       Family History:  Family History   Problem Relation Age of Onset    Emphysema Mother     Heart disease Maternal Uncle     Alcohol abuse Maternal Uncle     Hypertension Maternal Grandmother     Arthritis Maternal Grandmother     Heart disease Maternal Grandfather        Physical Exam:  /59 (BP Location: Right arm, Patient Position: Lying)   Pulse 95   Temp 97.7 °F (36.5 °C) (Oral)   Resp 18   Ht 168.9 cm (66.5\")   Wt 114 kg (251 lb 6.4 oz)   LMP  (LMP Unknown)   SpO2 96%   BMI 39.97 kg/m²  Body mass index is 39.97 kg/m². 96% 114 kg (251 lb 6.4 oz)  Constitutional: Middle age, obese female pt in bed, No acute respiratory distress, no accessory muscle use, reports inability to lay down secondary to shortness of breath  Head: - NCAT  Eyes: No pallor, Anicteric conjunctiva, EOMI.  ENMT:  Mallampati 3, no oral thrush. Dry MM.   NECK: Trachea midline, No thyromegaly, no palpable cervical LNpathy, no JVD  Heart: RRR, no murmur. No pedal edema   Lungs: BRENDA +, Diminished over right lung, no wheezes or crackles otherwise  Abdomen: Soft, obese. No tenderness, guarding or rigidity. No palpable masses  Extremities: Extremities warm and well perfused. No cyanosis/ clubbing  Neuro: Conscious, answers appropriately, no gross focal neuro deficits  Psych: " Mood and affect appropriate    PPE recommended per [facility] infectious disease Isolation protocol for the current clinical scenario(as mentioned below) was followed.      LABS:  COVID19   Date Value Ref Range Status   02/23/2025 Not Detected Not Detected - Ref. Range Final       Lab Results   Component Value Date    CALCIUM 7.8 (L) 03/14/2025     Results from last 7 days   Lab Units 03/14/25  1717   SODIUM mmol/L 138   POTASSIUM mmol/L 3.2*   CHLORIDE mmol/L 98   CO2 mmol/L 30.9*   BUN mg/dL 15   CREATININE mg/dL 1.37*   GLUCOSE mg/dL 132*   CALCIUM mg/dL 7.8*   WBC 10*3/mm3 10.21   HEMOGLOBIN g/dL 13.0   PLATELETS 10*3/mm3 142   ALT (SGPT) U/L 34*   AST (SGOT) U/L 68*   PROBNP pg/mL 142.0   PROCALCITONIN ng/mL 0.28*     Lab Results   Component Value Date    CKTOTAL 44 07/18/2023    TROPONINT 10 03/14/2025     Results from last 7 days   Lab Units 03/14/25  1824 03/14/25  1717   HSTROP T ng/L 10 10         Results from last 7 days   Lab Units 03/15/25  0023 03/14/25  1824 03/14/25  1717   PROCALCITONIN ng/mL  --   --  0.28*   LACTATE mmol/L 2.7* 4.2*  --                      Lab Results   Component Value Date    TSH 3.290 02/24/2025     Estimated Creatinine Clearance: 50.8 mL/min (A) (by C-G formula based on SCr of 1.37 mg/dL (H)).         Imaging: I personally visualized the images of scans/x-rays performed within last 3 days.      Assessment:  Large right pleural effusion  Pneumonia  Cirrhosis  Thrombocytopenia  Transaminitis  Acute kidney injury  Hypertension  Prediabetes with hyperglycemia    Recommendations:  Large right pleural effusion  CT chest reviewed showing large right pleural effusion with compressive atelectasis in the right lower and right upper lobe.  During last hospitalization, patient was noted as having a pleural effusion, appears to have increased in size.  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  Patient currently uncomfortable, unable to lay flat with  complaints of orthopnea.  Requiring supplemental oxygen, baseline is on room air.  Discussed with patient risks versus benefits of thoracentesis.  Patient is amendable to ultrasound guided thoracentesis today.  Orders placed.        Patient was placed in face mask upon entering room and kept mask on throughout our encounter. I wore full protective equipment throughout this patient encounter including a face mask, gown and gloves. Hand hygiene was performed before donning protective equipment and after removal when leaving the room.    Jaylene Banda, APRN  3/15/2025  03:46 EDT      Much of this encounter note is an electronic transcription/translation of spoken language to printed text using Dragon Software.

## 2025-03-15 NOTE — PROGRESS NOTES
Name: Lacey Ramirez ADMIT: 3/14/2025   : 1955  PCP: Johanny Hall MD    MRN: [id number] LOS: 1 days   AGE/SEX: 69 y.o. female  ROOM: [facility]   Subjective   Chief Complaint   Patient presents with    Chest Pain     70 yo with history of cirrhosis, HTN, HLD who presented with dyspnea. Came to [facility] ER and found to have large left sided pleural effusion    On oxygen  Still with dyspnea  On abx  Thoracentesis ordered    ROS  No f/c  No n/v  No cp/palp  + soa/cough    Objective   Vital Signs  Temp:  [97.3 °F (36.3 °C)-98.1 °F (36.7 °C)] 98.1 °F (36.7 °C)  Heart Rate:  [] 95  Resp:  [16-18] 18  BP: ()/(41-68) 107/60  SpO2:  [92 %-96 %] 96 %  on  Flow (L/min) (Oxygen Therapy):  [2-3] 3;   Device (Oxygen Therapy): nasal cannula  Body mass index is 39.97 kg/m².    Physical Exam  Constitutional:       General: She is not in acute distress.     Appearance: She is ill-appearing.   HENT:      Head: Normocephalic and atraumatic.   Eyes:      General: No scleral icterus.  Cardiovascular:      Rate and Rhythm: Regular rhythm.      Heart sounds: Normal heart sounds.   Pulmonary:      Effort: Respiratory distress present.      Breath sounds: Rhonchi present.   Abdominal:      General: There is no distension.      Palpations: Abdomen is soft.   Musculoskeletal:      Cervical back: Neck supple.      Right lower leg: Edema present.      Left lower leg: Edema present.   Neurological:      Mental Status: She is alert.   Psychiatric:         Behavior: Behavior normal.         Results Review:       I reviewed the patient's new clinical results.  Results from last 7 days   Lab Units 03/15/25  0320 25  1717   WBC 10*3/mm3 10.85*  10.55 10.21   HEMOGLOBIN g/dL 11.9*  11.5* 13.0   PLATELETS 10*3/mm3 130*  128* 142     Results from last 7 days   Lab Units 03/15/25  0629 03/15/25  0320 25  1717   SODIUM mmol/L 133* 133* 138   POTASSIUM mmol/L 3.3* 3.4* 3.2*   CHLORIDE mmol/L 96* 96* 98   CO2 mmol/L 29.8*  "28.4 30.9*   BUN mg/dL 20 19 15   CREATININE mg/dL 1.37* 1.22* 1.37*   GLUCOSE mg/dL 103* 121* 132*   Estimated Creatinine Clearance: 50.8 mL/min (A) (by C-G formula based on SCr of 1.37 mg/dL (H)).  Results from last 7 days   Lab Units 03/15/25  0320 03/14/25  1717   ALBUMIN g/dL 1.9* 2.1*   BILIRUBIN mg/dL 3.7* 4.5*   ALK PHOS U/L 205* 220*   AST (SGOT) U/L 61* 68*   ALT (SGPT) U/L 28 34*     Results from last 7 days   Lab Units 03/15/25  0629 03/15/25  0320 03/14/25  1717   CALCIUM mg/dL 7.7* 7.5* 7.8*   ALBUMIN g/dL  --  1.9* 2.1*     Results from last 7 days   Lab Units 03/15/25  0629 03/15/25  0320 03/15/25  0023 03/14/25  1824 03/14/25  1717   PROCALCITONIN ng/mL  --   --   --   --  0.28*   LACTATE mmol/L 1.5 2.4* 2.7* 4.2*  --        Coag   Results from last 7 days   Lab Units 03/15/25  1025   INR  1.82*     HbA1C   Lab Results   Component Value Date    HGBA1C <4.30 (L) 02/25/2025    HGBA1C 5.7 (H) 01/23/2024    HGBA1C 5.8 (H) 07/18/2023     Infection   Results from last 7 days   Lab Units 03/14/25  1717   PROCALCITONIN ng/mL 0.28*     Radiology(recent) CT Angiogram Chest  Result Date: 3/14/2025   1. The study is negative for pulmonary embolism. 2. Large right pleural effusion with complete or near complete consolidation and volume loss of the right lower lobe and patchy consolidation and volume loss in the base of the right upper lobe and perihilar right middle lobe, likely superimposed pneumonia. 2. Trace left pleural effusion. 3. Partially imaged cirrhotic liver with evidence of portal venous hypertension.  This report was finalized on 3/14/2025 9:11 PM by Corky Garrido MD on Workstation: [id number]      HS Troponin T   Date Value Ref Range Status   03/14/2025 10 <14 ng/L Final   03/14/2025 10 <14 ng/L Final     No components found for: \"TSH;2\"    albumin human, 50 g, Intravenous, Q12H  aspirin, 325 mg, Oral, Once  guaiFENesin, 1,200 mg, Oral, Q12H       Diet: Cardiac, Diabetic; Healthy Heart (2-3 Na+); " Consistent Carbohydrate; Fluid Consistency: Thin (IDDSI 0)      Assessment & Plan      Active Hospital Problems    Diagnosis  POA    **Pneumonia involving right lung [J18.9]  Yes    Pleural effusion [J90]  Yes    Cirrhosis [K74.60]  Yes    Thrombocytopenia [D69.6]  Yes    Transaminitis [R74.01]  Yes    Anemia [D64.9]  Yes    Benign essential hypertension [I10]  Yes    Hyperlipidemia [E78.5]  Yes      Resolved Hospital Problems   No resolved problems to display.     68 yo with history of cirrhosis, HTN, HLD who presented with dyspnea. Came to [facility] ER and found to have large left sided pleural effusion    Possible PNA on abx. Thoracetesis ordered. Pulmonary evaluated. Procal borderline elevated will continue ceftriaxone for the time being.   Troponin and BNP normal  Outpatient follow up with Virgil JARA for cirrhosis  Nephrology has seen and plan on albumin and renal u/s. Some peripheral volume excess but low BP currently. Will add midodrine.      DW staff  Reviewed records      Refugio Rao MD  [facility]  03/15/25  12:40 EDT

## 2025-03-16 ENCOUNTER — APPOINTMENT (OUTPATIENT)
Dept: GENERAL RADIOLOGY | Facility: HOSPITAL | Age: 70
DRG: 186 | End: 2025-03-16
Payer: MEDICARE

## 2025-03-16 ENCOUNTER — APPOINTMENT (OUTPATIENT)
Dept: ULTRASOUND IMAGING | Facility: HOSPITAL | Age: 70
DRG: 186 | End: 2025-03-16
Payer: MEDICARE

## 2025-03-16 LAB
ALBUMIN FLD-MCNC: 0.8 G/DL
AMYLASE FLD-CCNC: 14 U/L
ANION GAP SERPL CALCULATED.3IONS-SCNC: 10.4 MMOL/L (ref 5–15)
APPEARANCE FLD: ABNORMAL
BUN SERPL-MCNC: 17 MG/DL (ref 8–23)
BUN/CREAT SERPL: 18.5 (ref 7–25)
CALCIUM SPEC-SCNC: 8.3 MG/DL (ref 8.6–10.5)
CHLORIDE SERPL-SCNC: 101 MMOL/L (ref 98–107)
CHOLESTEROL FLUID: 24 MG/DL
CO2 SERPL-SCNC: 26.6 MMOL/L (ref 22–29)
COLOR FLD: YELLOW
CREAT SERPL-MCNC: 0.92 MG/DL (ref 0.57–1)
DEPRECATED RDW RBC AUTO: 51.4 FL (ref 37–54)
EGFRCR SERPLBLD CKD-EPI 2021: 68 ML/MIN/1.73
ERYTHROCYTE [DISTWIDTH] IN BLOOD BY AUTOMATED COUNT: 14.1 % (ref 12.3–15.4)
GLUCOSE FLD-MCNC: 106 MG/DL
GLUCOSE SERPL-MCNC: 140 MG/DL (ref 65–99)
HCT VFR BLD AUTO: 31.3 % (ref 34–46.6)
HGB BLD-MCNC: 10.9 G/DL (ref 12–15.9)
INR PPP: 2.12 (ref 0.9–1.1)
LDH FLD-CCNC: 78 U/L
LYMPHOCYTES NFR FLD MANUAL: 41 %
MAGNESIUM SERPL-MCNC: 2.2 MG/DL (ref 1.6–2.4)
MCH RBC QN AUTO: 34.2 PG (ref 26.6–33)
MCHC RBC AUTO-ENTMCNC: 34.8 G/DL (ref 31.5–35.7)
MCV RBC AUTO: 98.1 FL (ref 79–97)
METHOD: ABNORMAL
MONOCYTES NFR FLD: 21 %
MONOS+MACROS NFR FLD: 26 %
NEUTROPHILS NFR FLD MANUAL: 12 %
NUC CELL # FLD: 1013 /MM3
PH FLD: 7.5 [PH]
PLATELET # BLD AUTO: 99 10*3/MM3 (ref 140–450)
PMV BLD AUTO: 10 FL (ref 6–12)
POTASSIUM SERPL-SCNC: 3.9 MMOL/L (ref 3.5–5.2)
PROT FLD-MCNC: 1.5 G/DL
PROTHROMBIN TIME: 23.9 SECONDS (ref 11.7–14.2)
RBC # BLD AUTO: 3.19 10*6/MM3 (ref 3.77–5.28)
RBC # FLD AUTO: 1168 /MM3
SODIUM SERPL-SCNC: 138 MMOL/L (ref 136–145)
TRIGL FLD-MCNC: 53 MG/DL
WBC NRBC COR # BLD AUTO: 7.41 10*3/MM3 (ref 3.4–10.8)

## 2025-03-16 PROCEDURE — 25010000002 CEFTRIAXONE PER 250 MG: Performed by: INTERNAL MEDICINE

## 2025-03-16 PROCEDURE — 76942 ECHO GUIDE FOR BIOPSY: CPT

## 2025-03-16 PROCEDURE — 87206 SMEAR FLUORESCENT/ACID STAI: CPT

## 2025-03-16 PROCEDURE — 83615 LACTATE (LD) (LDH) ENZYME: CPT

## 2025-03-16 PROCEDURE — 82945 GLUCOSE OTHER FLUID: CPT

## 2025-03-16 PROCEDURE — 71045 X-RAY EXAM CHEST 1 VIEW: CPT

## 2025-03-16 PROCEDURE — 87205 SMEAR GRAM STAIN: CPT

## 2025-03-16 PROCEDURE — 83735 ASSAY OF MAGNESIUM: CPT | Performed by: INTERNAL MEDICINE

## 2025-03-16 PROCEDURE — 89051 BODY FLUID CELL COUNT: CPT

## 2025-03-16 PROCEDURE — 25010000002 ALBUMIN HUMAN 25% PER 50 ML: Performed by: HOSPITALIST

## 2025-03-16 PROCEDURE — 80048 BASIC METABOLIC PNL TOTAL CA: CPT | Performed by: INTERNAL MEDICINE

## 2025-03-16 PROCEDURE — 88305 TISSUE EXAM BY PATHOLOGIST: CPT

## 2025-03-16 PROCEDURE — 87116 MYCOBACTERIA CULTURE: CPT

## 2025-03-16 PROCEDURE — 84311 SPECTROPHOTOMETRY: CPT

## 2025-03-16 PROCEDURE — 82042 OTHER SOURCE ALBUMIN QUAN EA: CPT

## 2025-03-16 PROCEDURE — 88112 CYTOPATH CELL ENHANCE TECH: CPT

## 2025-03-16 PROCEDURE — 87070 CULTURE OTHR SPECIMN AEROBIC: CPT

## 2025-03-16 PROCEDURE — 87015 SPECIMEN INFECT AGNT CONCNTJ: CPT

## 2025-03-16 PROCEDURE — 88184 FLOWCYTOMETRY/ TC 1 MARKER: CPT

## 2025-03-16 PROCEDURE — 84478 ASSAY OF TRIGLYCERIDES: CPT

## 2025-03-16 PROCEDURE — 84157 ASSAY OF PROTEIN OTHER: CPT

## 2025-03-16 PROCEDURE — 25010000002 LIDOCAINE 1 % SOLUTION: Performed by: RADIOLOGY

## 2025-03-16 PROCEDURE — 82150 ASSAY OF AMYLASE: CPT

## 2025-03-16 PROCEDURE — 0W993ZZ DRAINAGE OF RIGHT PLEURAL CAVITY, PERCUTANEOUS APPROACH: ICD-10-PCS | Performed by: INTERNAL MEDICINE

## 2025-03-16 PROCEDURE — 88185 FLOWCYTOMETRY/TC ADD-ON: CPT

## 2025-03-16 PROCEDURE — 85610 PROTHROMBIN TIME: CPT | Performed by: INTERNAL MEDICINE

## 2025-03-16 PROCEDURE — P9047 ALBUMIN (HUMAN), 25%, 50ML: HCPCS | Performed by: HOSPITALIST

## 2025-03-16 PROCEDURE — 83986 ASSAY PH BODY FLUID NOS: CPT

## 2025-03-16 PROCEDURE — 76775 US EXAM ABDO BACK WALL LIM: CPT

## 2025-03-16 PROCEDURE — 85027 COMPLETE CBC AUTOMATED: CPT | Performed by: INTERNAL MEDICINE

## 2025-03-16 RX ORDER — LIDOCAINE HYDROCHLORIDE 10 MG/ML
10 INJECTION, SOLUTION INFILTRATION; PERINEURAL ONCE
Status: COMPLETED | OUTPATIENT
Start: 2025-03-16 | End: 2025-03-16

## 2025-03-16 RX ORDER — AMOXICILLIN 250 MG
1 CAPSULE ORAL 2 TIMES DAILY
Status: DISCONTINUED | OUTPATIENT
Start: 2025-03-16 | End: 2025-03-20 | Stop reason: HOSPADM

## 2025-03-16 RX ADMIN — BENZONATATE 200 MG: 100 CAPSULE ORAL at 03:32

## 2025-03-16 RX ADMIN — SENNOSIDES AND DOCUSATE SODIUM 1 TABLET: 50; 8.6 TABLET ORAL at 17:54

## 2025-03-16 RX ADMIN — GUAIFENESIN 1200 MG: 600 TABLET, MULTILAYER, EXTENDED RELEASE ORAL at 08:39

## 2025-03-16 RX ADMIN — PANTOPRAZOLE SODIUM 40 MG: 40 TABLET, DELAYED RELEASE ORAL at 08:39

## 2025-03-16 RX ADMIN — ALBUMIN (HUMAN) 50 G: 0.25 INJECTION, SOLUTION INTRAVENOUS at 12:34

## 2025-03-16 RX ADMIN — LIDOCAINE HYDROCHLORIDE 10 ML: 10 INJECTION, SOLUTION INFILTRATION; PERINEURAL at 10:40

## 2025-03-16 RX ADMIN — MIDODRINE HYDROCHLORIDE 5 MG: 5 TABLET ORAL at 12:34

## 2025-03-16 RX ADMIN — MIDODRINE HYDROCHLORIDE 5 MG: 5 TABLET ORAL at 17:54

## 2025-03-16 RX ADMIN — SENNOSIDES AND DOCUSATE SODIUM 1 TABLET: 50; 8.6 TABLET ORAL at 21:09

## 2025-03-16 RX ADMIN — MIDODRINE HYDROCHLORIDE 5 MG: 5 TABLET ORAL at 08:39

## 2025-03-16 RX ADMIN — GUAIFENESIN 1200 MG: 600 TABLET, MULTILAYER, EXTENDED RELEASE ORAL at 21:09

## 2025-03-16 RX ADMIN — BENZONATATE 200 MG: 100 CAPSULE ORAL at 12:34

## 2025-03-16 RX ADMIN — CEFTRIAXONE 2000 MG: 2 INJECTION, POWDER, FOR SOLUTION INTRAMUSCULAR; INTRAVENOUS at 21:08

## 2025-03-16 RX ADMIN — ALBUMIN (HUMAN) 50 G: 0.25 INJECTION, SOLUTION INTRAVENOUS at 00:07

## 2025-03-16 NOTE — PROGRESS NOTES
Name: Lacey Ramirez ADMIT: 3/14/2025   : 1955  PCP: Johanny Hall MD    MRN: [id number] LOS: 2 days   AGE/SEX: 69 y.o. female  ROOM: [facility]   Subjective   Chief Complaint   Patient presents with    Chest Pain     68 yo with history of cirrhosis, HTN, HLD who presented with dyspnea. Came to [facility] ER and found to have large left sided pleural effusion    On oxygen  Still with dyspnea  On abx  Thoracentesis ordered- to happen today  Not having regular BMs    ROS  No f/c  No n/v  No cp/palp  + soa/cough    Objective   Vital Signs  Temp:  [97.5 °F (36.4 °C)-97.9 °F (36.6 °C)] 97.7 °F (36.5 °C)  Heart Rate:  [] 100  Resp:  [16-18] 18  BP: ()/(45-74) 95/54  SpO2:  [95 %-96 %] 95 %  on  Flow (L/min) (Oxygen Therapy):  [2-3] 2;   Device (Oxygen Therapy): nasal cannula  Body mass index is 39.97 kg/m².    Physical Exam  Constitutional:       General: She is not in acute distress.     Appearance: She is ill-appearing.   HENT:      Head: Normocephalic and atraumatic.   Eyes:      General: No scleral icterus.  Cardiovascular:      Rate and Rhythm: Regular rhythm.      Heart sounds: Normal heart sounds.   Pulmonary:      Effort: Respiratory distress present.      Breath sounds: Rhonchi present.   Abdominal:      General: There is no distension.      Palpations: Abdomen is soft.   Musculoskeletal:      Cervical back: Neck supple.      Right lower leg: Edema present.      Left lower leg: Edema present.   Neurological:      Mental Status: She is alert.   Psychiatric:         Behavior: Behavior normal.     Similar exam to yesterday     Results Review:       I reviewed the patient's new clinical results.  Results from last 7 days   Lab Units 03/15/25  03225  1717   WBC 10*3/mm3 10.85*  10.55 10.21   HEMOGLOBIN g/dL 11.9*  11.5* 13.0   PLATELETS 10*3/mm3 130*  128* 142     Results from last 7 days   Lab Units 03/15/25  1711 03/15/25  0629 03/15/25  0320 25  1717   SODIUM mmol/L  --  133*  133* 138   POTASSIUM mmol/L 3.8 3.3* 3.4* 3.2*   CHLORIDE mmol/L  --  96* 96* 98   CO2 mmol/L  --  29.8* 28.4 30.9*   BUN mg/dL  --  20 19 15   CREATININE mg/dL  --  1.37* 1.22* 1.37*   GLUCOSE mg/dL  --  103* 121* 132*   Estimated Creatinine Clearance: 50.8 mL/min (A) (by C-G formula based on SCr of 1.37 mg/dL (H)).  Results from last 7 days   Lab Units 03/15/25  0320 03/14/25  1717   ALBUMIN g/dL 1.9* 2.1*   BILIRUBIN mg/dL 3.7* 4.5*   ALK PHOS U/L 205* 220*   AST (SGOT) U/L 61* 68*   ALT (SGPT) U/L 28 34*     Results from last 7 days   Lab Units 03/15/25  0629 03/15/25  0320 03/14/25  1717   CALCIUM mg/dL 7.7* 7.5* 7.8*   ALBUMIN g/dL  --  1.9* 2.1*     Results from last 7 days   Lab Units 03/15/25  0629 03/15/25  0320 03/15/25  0023 03/14/25  1824 03/14/25  1717   PROCALCITONIN ng/mL  --   --   --   --  0.28*   LACTATE mmol/L 1.5 2.4* 2.7* 4.2*  --        Coag   Results from last 7 days   Lab Units 03/15/25  1025   INR  1.82*     HbA1C   Lab Results   Component Value Date    HGBA1C <4.30 (L) 02/25/2025    HGBA1C 5.7 (H) 01/23/2024    HGBA1C 5.8 (H) 07/18/2023     Infection   Results from last 7 days   Lab Units 03/14/25  1824 03/14/25  1717   BLOODCX  No growth at 24 hours  No growth at 24 hours  --    PROCALCITONIN ng/mL  --  0.28*     Radiology(recent) CT Angiogram Chest  Result Date: 3/14/2025   1. The study is negative for pulmonary embolism. 2. Large right pleural effusion with complete or near complete consolidation and volume loss of the right lower lobe and patchy consolidation and volume loss in the base of the right upper lobe and perihilar right middle lobe, likely superimposed pneumonia. 2. Trace left pleural effusion. 3. Partially imaged cirrhotic liver with evidence of portal venous hypertension.  This report was finalized on 3/14/2025 9:11 PM by Corky Garrido MD on Workstation: [id number]      HS Troponin T   Date Value Ref Range Status   03/14/2025 10 <14 ng/L Final   03/14/2025 10 <14 ng/L  "Final     No components found for: \"TSH;2\"    albumin human, 50 g, Intravenous, Q12H  aspirin, 325 mg, Oral, Once  cefTRIAXone, 2,000 mg, Intravenous, Q24H  guaiFENesin, 1,200 mg, Oral, Q12H  midodrine, 5 mg, Oral, TID AC  pantoprazole, 40 mg, Oral, QAM AC       Diet: Cardiac, Diabetic; Healthy Heart (2-3 Na+); Consistent Carbohydrate; Fluid Consistency: Thin (IDDSI 0)      Assessment & Plan      Active Hospital Problems    Diagnosis  POA    **Pneumonia involving right lung [J18.9]  Yes    Pleural effusion [J90]  Yes    Cirrhosis [K74.60]  Yes    Thrombocytopenia [D69.6]  Yes    Transaminitis [R74.01]  Yes    Anemia [D64.9]  Yes    Benign essential hypertension [I10]  Yes    Hyperlipidemia [E78.5]  Yes      Resolved Hospital Problems   No resolved problems to display.     68 yo with history of cirrhosis, HTN, HLD who presented with dyspnea. Came to [facility] ER and found to have large left sided pleural effusion    Possible PNA on abx. Thoracetesis ordered. Pulmonary evaluated. Procal borderline elevated will continue ceftriaxone for the time being. Follow symptoms for improvement with thoracentesis   Troponin and BNP normal  Outpatient follow up with Virgil JARA for cirrhosis  Nephrology has seen and s/p albumin and renal u/s. Some peripheral volume excess but low BP currently. added midodrine. May benefit from diuretics at some point will see what Nephrology thinks  Add senna-s to promote stools with cirrhosis. She states she doesn't take liquid medicine well so starting with that instead of lactulose      DW staff  Reviewed records      Refugio Rao MD  [facility]  03/16/25  12:40 EDT  "

## 2025-03-16 NOTE — PLAN OF CARE
Problem: Adult Inpatient Plan of Care  Goal: Plan of Care Review  Outcome: Progressing  Goal: Patient-Specific Goal (Individualized)  Outcome: Progressing  Goal: Absence of Hospital-Acquired Illness or Injury  Outcome: Progressing  Intervention: Identify and Manage Fall Risk  Recent Flowsheet Documentation  Taken 3/16/2025 1443 by Jay Jay Luz RN  Safety Promotion/Fall Prevention:   safety round/check completed   room organization consistent  Taken 3/16/2025 0810 by Jay Jay Luz RN  Safety Promotion/Fall Prevention:   safety round/check completed   room organization consistent  Intervention: Prevent Skin Injury  Recent Flowsheet Documentation  Taken 3/16/2025 1443 by Jay Jay Luz RN  Body Position: dangle, side of bed  Skin Protection:   transparent dressing maintained   incontinence pads utilized  Taken 3/16/2025 0810 by Jay Jay Luz RN  Body Position:   sitting up in bed   position changed independently  Skin Protection:   transparent dressing maintained   incontinence pads utilized  Intervention: Prevent Infection  Recent Flowsheet Documentation  Taken 3/16/2025 1443 by Jay Jay Luz RN  Infection Prevention:   single patient room provided   rest/sleep promoted   hand hygiene promoted  Goal: Optimal Comfort and Wellbeing  Outcome: Progressing  Intervention: Provide Person-Centered Care  Recent Flowsheet Documentation  Taken 3/16/2025 1443 by Jay Jay Luz RN  Trust Relationship/Rapport:   thoughts/feelings acknowledged   reassurance provided   questions encouraged   questions answered   empathic listening provided   emotional support provided   choices provided   care explained  Taken 3/16/2025 0810 by Jay Jay Luz RN  Trust Relationship/Rapport:   care explained   choices provided   emotional support provided   empathic listening provided   questions answered   questions encouraged   reassurance provided    thoughts/feelings acknowledged  Goal: Readiness for Transition of Care  Outcome: Progressing     Problem: Comorbidity Management  Goal: Blood Pressure in Desired Range  Outcome: Progressing  Intervention: Maintain Blood Pressure Management  Recent Flowsheet Documentation  Taken 3/16/2025 1443 by Jay Jay Luz RN  Medication Review/Management: medications reviewed  Taken 3/16/2025 0810 by Jay Jay Luz RN  Medication Review/Management: medications reviewed     Problem: Fall Injury Risk  Goal: Absence of Fall and Fall-Related Injury  Outcome: Progressing  Intervention: Identify and Manage Contributors  Recent Flowsheet Documentation  Taken 3/16/2025 1443 by Jay Jay Luz RN  Medication Review/Management: medications reviewed  Self-Care Promotion: independence encouraged  Taken 3/16/2025 0810 by Jay Jay Luz RN  Medication Review/Management: medications reviewed  Self-Care Promotion: independence encouraged  Intervention: Promote Injury-Free Environment  Recent Flowsheet Documentation  Taken 3/16/2025 1443 by Jay Jay Luz RN  Safety Promotion/Fall Prevention:   safety round/check completed   room organization consistent  Taken 3/16/2025 0810 by Jay Jay Luz RN  Safety Promotion/Fall Prevention:   safety round/check completed   room organization consistent   Goal Outcome Evaluation:

## 2025-03-16 NOTE — PROGRESS NOTES
"                                                                                                            [facility]                                                                     Nephrology Progress Note                                                                                LOS: 2 days     Chief Complaint/ Reason for encounter: ELTON    Subjective   03/16/25 :   Patient still feels short of breath, requiring supplemental oxygen. Will be going for ultrasound guided thoracentesis today.      Medical history reviewed:  History of Present Illness    Subjective    History taken from: Patient and chart    Vital Signs  Temp:  [97.5 °F (36.4 °C)-97.9 °F (36.6 °C)] 97.7 °F (36.5 °C)  Heart Rate:  [] 100  Resp:  [16-18] 18  BP: ()/(45-74) 95/54       Wt Readings from Last 1 Encounters:   03/14/25 2353 114 kg (251 lb 6.4 oz)   03/14/25 1708 111 kg (244 lb 11.4 oz)       Objective:  Vital signs: (most recent): Blood pressure 95/54, pulse 100, temperature 97.7 °F (36.5 °C), temperature source Oral, resp. rate 18, height 168.9 cm (66.5\"), weight 114 kg (251 lb 6.4 oz), SpO2 95%, not currently breastfeeding.                Objective:  General Appearance:  Comfortable, -appearing, in no acute distress and not in pain.  Awake, alert  HEENT: Mucous membranes moist, no injury, oropharynx clear  Lungs:  Normal effort and normal respiratory rate.  Breath sounds clear to auscultation.  No  respiratory distress.  No rales, decreased breath sounds or rhonchi.    Heart: Normal rate.  Regular rhythm.  S1, S2 normal.  No murmur.   Abdomen: Abdomen is soft.  Bowel sounds are normal, no abdominal tenderness.  There is no rebound or guarding  Extremities:  positive edema of bilateral lower extremities  Skin:  Warm and dry with no rashes      Results Review:    Intake/Output:     Intake/Output Summary (Last 24 hours) at 3/16/2025 1131  Last data filed at 3/16/2025 1057  Gross per 24 hour   Intake 240 ml "   Output 1900 ml   Net -1660 ml         DATA:  Radiology and Labs:  The following labs independently reviewed by me. Additional labs ordered for tomorrow a.m.  Interval notes, chart personally reviewed by me.   Old records independently reviewed showing   The following radiologic studies independently viewed by me, findings   New problems include  Discussed with     Risk/ complexity of medical care/ medical decision making high    Labs:   Recent Results (from the past 24 hours)   Potassium    Collection Time: 03/15/25  5:11 PM    Specimen: Blood   Result Value Ref Range    Potassium 3.8 3.5 - 5.2 mmol/L       Radiology:  Pertinent radiology studies were reviewed as described above      Medications have been reviewed separately in chart overview      ASSESSMENT / PLAN    Acute kidney injury, likely hepatorenal versus component of intravascular volume depletion related to hypoalbuminemia.  Hepatorenal syndrome is a diagnosis of exclusion.  Urine sodium less than 20.      Plan  UA bland  Continue  albumin 25%  50 g every 12 for 2 days ( 1 mg per Kg)  US renal to rule out hydro  Keep maps above at least 65, ideally around 75.  Avoid NSAID, avoid nephrotoxic medication  Dose medication for decreased GFR less than 40  Patient noted off diuretic therapy  BMP revealed Cr of 1.37, urine output not documented. But says has been voiding.      Hypoxia requiring supplemental oxygen secondary to right-sided pleural effusion, pulmonary on board, will go for US guided thoracentesis today.      Liver cirrhosis with ascites /portal hypertension  Obesity  Thrombocytopenia  Prediabetes      Maricruz Aguilar MD  [facility]  Office phone number: [phone]  Answering service phone number: [phone]    03/16/25  11:31 EDT    Dictation performed using Dragon dictation software

## 2025-03-16 NOTE — PROGRESS NOTES
Consult Daily Progress Note  [facility]   03/16/25      Patient Name:  Lacey Ramirez  MRN:  [id number]   YOB: 1955  Age: 69 y.o.  Sex: female  LOS: 2    Reason for Consult:  Large right pleural effusion    Hospital Course:   69-year-old female with a history of cirrhosis who presented with chest pain and shortness of breath and found to have large right-sided pleural effusion in addition to pneumonia for which pulmonary was consulted.    Interval History:  No acute events overnight  Thoracentesis today with 1900 cc removed  2 L nasal cannula  Breathing is markedly improved after thoracentesis today  No chest pain  Does have a cough without sputum production  No fevers or chills    Physical Exam:  Vitals:    03/16/25 1328   BP: 129/65   Pulse:    Resp: 18   Temp: 97.9 °F (36.6 °C)   SpO2:        Intake/Output    Intake/Output Summary (Last 24 hours) at 3/16/2025 1347  Last data filed at 3/16/2025 1057  Gross per 24 hour   Intake 240 ml   Output 1900 ml   Net -1660 ml       General: Alert, nontoxic, NAD  HEENT: NC/AT, EOMI, MMM  Neck: Supple, trachea midline  Cardiac: RRR, no murmur, gallops, rubs  Pulmonary: Clear to auscultation bilaterally, no adventitious breath sounds, normal respiratory effort  GI: Soft, non-tender, non-distended, normal bowel sounds  Extremities: Warm, well perfused, no LE edema  Skin: no visible rash  Neuro: CN II - XII grossly intact  Psychiatry: Normal mood and affect      Data Review:  Results from last 7 days   Lab Units 03/15/25  0320 03/14/25  1717   WBC 10*3/mm3 10.85*  10.55 10.21   HEMOGLOBIN g/dL 11.9*  11.5* 13.0   PLATELETS 10*3/mm3 130*  128* 142     Results from last 7 days   Lab Units 03/15/25  1711 03/15/25  0629 03/15/25  0320 03/14/25  1717   SODIUM mmol/L  --  133* 133* 138   POTASSIUM mmol/L 3.8 3.3* 3.4* 3.2*   CHLORIDE mmol/L  --  96* 96* 98   CO2 mmol/L  --  29.8* 28.4 30.9*   BUN mg/dL  --  20 19 15   CREATININE mg/dL  --  1.37*  1.22* 1.37*   GLUCOSE mg/dL  --  103* 121* 132*   CALCIUM mg/dL  --  7.7* 7.5* 7.8*   Estimated Creatinine Clearance: 50.8 mL/min (A) (by C-G formula based on SCr of 1.37 mg/dL (H)).    Results from last 7 days   Lab Units 03/15/25  0629 03/15/25  0320 03/15/25  0023 03/14/25  1824 03/14/25  1717   LDH U/L  --  235*  --   --   --    AST (SGOT) U/L  --  61*  --   --  68*   ALT (SGPT) U/L  --  28  --   --  34*   PROCALCITONIN ng/mL  --   --   --   --  0.28*   LACTATE mmol/L 1.5 2.4* 2.7*   < >  --    PLATELETS 10*3/mm3  --  130*  128*  --   --  142    < > = values in this interval not displayed.             Result Review:  I have personally reviewed the results from the time of this admission to 3/16/2025 13:47 EDT and agree with these findings:  [x]  Laboratory list / accordion  [x]  Microbiology  [x]  Radiology  []  EKG/Telemetry   [x]  Cardiology/Vascular   []  Pathology  [x]  Old records  []  Other:    Imaging:  Reviewed chest images personally from past 3 days    ASSESSMENT  /  PLAN:    Large right pleural effusion  Thoracentesis (3/16)-1900 cc removed, transudative  Pneumonia  Cirrhosis  Thrombocytopenia  Transaminitis  Acute kidney injury  Hypertension  Prediabetes with hyperglycemia    -Patient presented to the hospital with shortness of breath and found to have large right pleural effusion for which she underwent thoracentesis today.  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  -Post Thora chest x-ray demonstrates significant improvement in the right pleural effusion and mild fluid in the minor fissure.  -On ceftriaxone for suspected pneumonia, my suspicion is low especially with the drastic improvement in her imaging findings after thoracentesis.  If cultures remain negative tomorrow from thoracentesis, will discontinue antibiotics.  -Patient will need optimization of her cirrhosis to prevent recurrent episodes of hepatic hydrothorax  -Ongoing evaluation by nephrology for  ELTON  -Remainder of care as per hospitalist primary    All issues new to me today.  Prior hospital course, labs and imaging reviewed.    Thank you for allowing us to participate in this patients care. Pulmonary will continue to follow.     Aleksandr Rosales MD  [facility]  Pulmonary and Critical Care Medicine, Interventional Pulmonology    Parts of this note may be an electronic transcription/translation of spoken language to printed text using the Dragon dictation system.

## 2025-03-17 LAB
ANION GAP SERPL CALCULATED.3IONS-SCNC: 10.5 MMOL/L (ref 5–15)
BUN SERPL-MCNC: 13 MG/DL (ref 8–23)
BUN/CREAT SERPL: 17.8 (ref 7–25)
CALCIUM SPEC-SCNC: 8.2 MG/DL (ref 8.6–10.5)
CHLORIDE SERPL-SCNC: 103 MMOL/L (ref 98–107)
CO2 SERPL-SCNC: 25.5 MMOL/L (ref 22–29)
CREAT SERPL-MCNC: 0.73 MG/DL (ref 0.57–1)
DEPRECATED RDW RBC AUTO: 49.5 FL (ref 37–54)
EGFRCR SERPLBLD CKD-EPI 2021: 89.7 ML/MIN/1.73
ERYTHROCYTE [DISTWIDTH] IN BLOOD BY AUTOMATED COUNT: 14.2 % (ref 12.3–15.4)
GEN 5 1HR TROPONIN T REFLEX: 10 NG/L
GLUCOSE SERPL-MCNC: 138 MG/DL (ref 65–99)
HCT VFR BLD AUTO: 28.6 % (ref 34–46.6)
HGB BLD-MCNC: 10 G/DL (ref 12–15.9)
MAGNESIUM SERPL-MCNC: 2.3 MG/DL (ref 1.6–2.4)
MCH RBC QN AUTO: 33.8 PG (ref 26.6–33)
MCHC RBC AUTO-ENTMCNC: 35 G/DL (ref 31.5–35.7)
MCV RBC AUTO: 96.6 FL (ref 79–97)
PLATELET # BLD AUTO: 91 10*3/MM3 (ref 140–450)
PMV BLD AUTO: 10.6 FL (ref 6–12)
POTASSIUM SERPL-SCNC: 3.5 MMOL/L (ref 3.5–5.2)
POTASSIUM SERPL-SCNC: 4.5 MMOL/L (ref 3.5–5.2)
QT INTERVAL: 384 MS
QTC INTERVAL: 471 MS
RBC # BLD AUTO: 2.96 10*6/MM3 (ref 3.77–5.28)
SODIUM SERPL-SCNC: 139 MMOL/L (ref 136–145)
TROPONIN T NUMERIC DELTA: -1 NG/L
TROPONIN T SERPL HS-MCNC: 11 NG/L
WBC NRBC COR # BLD AUTO: 6.48 10*3/MM3 (ref 3.4–10.8)

## 2025-03-17 PROCEDURE — 84484 ASSAY OF TROPONIN QUANT: CPT | Performed by: INTERNAL MEDICINE

## 2025-03-17 PROCEDURE — 93005 ELECTROCARDIOGRAM TRACING: CPT | Performed by: INTERNAL MEDICINE

## 2025-03-17 PROCEDURE — 25010000002 ALBUMIN HUMAN 25% PER 50 ML: Performed by: HOSPITALIST

## 2025-03-17 PROCEDURE — P9047 ALBUMIN (HUMAN), 25%, 50ML: HCPCS | Performed by: HOSPITALIST

## 2025-03-17 PROCEDURE — 80048 BASIC METABOLIC PNL TOTAL CA: CPT | Performed by: INTERNAL MEDICINE

## 2025-03-17 PROCEDURE — 84132 ASSAY OF SERUM POTASSIUM: CPT | Performed by: INTERNAL MEDICINE

## 2025-03-17 PROCEDURE — 83735 ASSAY OF MAGNESIUM: CPT | Performed by: INTERNAL MEDICINE

## 2025-03-17 PROCEDURE — 85027 COMPLETE CBC AUTOMATED: CPT | Performed by: INTERNAL MEDICINE

## 2025-03-17 PROCEDURE — 93010 ELECTROCARDIOGRAM REPORT: CPT | Performed by: INTERNAL MEDICINE

## 2025-03-17 RX ORDER — IPRATROPIUM BROMIDE AND ALBUTEROL SULFATE 2.5; .5 MG/3ML; MG/3ML
3 SOLUTION RESPIRATORY (INHALATION)
Status: DISCONTINUED | OUTPATIENT
Start: 2025-03-17 | End: 2025-03-20 | Stop reason: HOSPADM

## 2025-03-17 RX ORDER — HYDROCODONE BITARTRATE AND ACETAMINOPHEN 7.5; 325 MG/1; MG/1
1 TABLET ORAL EVERY 6 HOURS PRN
Refills: 0 | Status: DISCONTINUED | OUTPATIENT
Start: 2025-03-17 | End: 2025-03-20 | Stop reason: HOSPADM

## 2025-03-17 RX ORDER — FUROSEMIDE 40 MG/1
40 TABLET ORAL DAILY
Status: DISCONTINUED | OUTPATIENT
Start: 2025-03-17 | End: 2025-03-20 | Stop reason: HOSPADM

## 2025-03-17 RX ORDER — POTASSIUM CHLORIDE 1500 MG/1
40 TABLET, EXTENDED RELEASE ORAL EVERY 4 HOURS
Status: COMPLETED | OUTPATIENT
Start: 2025-03-17 | End: 2025-03-17

## 2025-03-17 RX ADMIN — MIDODRINE HYDROCHLORIDE 5 MG: 5 TABLET ORAL at 18:20

## 2025-03-17 RX ADMIN — BENZONATATE 200 MG: 100 CAPSULE ORAL at 00:20

## 2025-03-17 RX ADMIN — ALBUMIN (HUMAN) 50 G: 0.25 INJECTION, SOLUTION INTRAVENOUS at 00:21

## 2025-03-17 RX ADMIN — GUAIFENESIN 1200 MG: 600 TABLET, MULTILAYER, EXTENDED RELEASE ORAL at 08:59

## 2025-03-17 RX ADMIN — GUAIFENESIN 1200 MG: 600 TABLET, MULTILAYER, EXTENDED RELEASE ORAL at 20:14

## 2025-03-17 RX ADMIN — FUROSEMIDE 40 MG: 40 TABLET ORAL at 13:47

## 2025-03-17 RX ADMIN — POTASSIUM CHLORIDE 40 MEQ: 1500 TABLET, EXTENDED RELEASE ORAL at 10:15

## 2025-03-17 RX ADMIN — SENNOSIDES AND DOCUSATE SODIUM 1 TABLET: 50; 8.6 TABLET ORAL at 08:59

## 2025-03-17 RX ADMIN — PANTOPRAZOLE SODIUM 40 MG: 40 TABLET, DELAYED RELEASE ORAL at 08:59

## 2025-03-17 RX ADMIN — HYDROCODONE BITARTRATE AND ACETAMINOPHEN 1 TABLET: 7.5; 325 TABLET ORAL at 10:14

## 2025-03-17 RX ADMIN — HYDROCODONE BITARTRATE AND ACETAMINOPHEN 1 TABLET: 7.5; 325 TABLET ORAL at 18:20

## 2025-03-17 RX ADMIN — BENZONATATE 200 MG: 100 CAPSULE ORAL at 08:59

## 2025-03-17 RX ADMIN — MIDODRINE HYDROCHLORIDE 5 MG: 5 TABLET ORAL at 13:47

## 2025-03-17 RX ADMIN — SENNOSIDES AND DOCUSATE SODIUM 1 TABLET: 50; 8.6 TABLET ORAL at 20:15

## 2025-03-17 RX ADMIN — POTASSIUM CHLORIDE 40 MEQ: 1500 TABLET, EXTENDED RELEASE ORAL at 07:00

## 2025-03-17 RX ADMIN — BENZONATATE 200 MG: 100 CAPSULE ORAL at 20:15

## 2025-03-17 RX ADMIN — MIDODRINE HYDROCHLORIDE 5 MG: 5 TABLET ORAL at 08:59

## 2025-03-17 NOTE — CASE MANAGEMENT/SOCIAL WORK
Discharge Planning Assessment  [facility]     Patient Name: Lacey Ramirez  MRN: [id number]  Today's Date: 3/17/2025    Admit Date: 3/14/2025    Plan: Home, family to transport.   Discharge Needs Assessment       Row Name 03/17/25 1333       Living Environment    People in Home spouse    Current Living Arrangements home    Family Caregiver if Needed spouse    Quality of Family Relationships helpful;involved;supportive    Able to Return to Prior Arrangements yes       Transition Planning    Patient/Family Anticipates Transition to home with family    Patient/Family Anticipated Services at Transition     Transportation Anticipated family or friend will provide;car, drives self       Discharge Needs Assessment    Readmission Within the Last 30 Days other (see comments)    Equipment Currently Used at Home none    Concerns to be Addressed no discharge needs identified;denies needs/concerns at this time    Equipment Needed After Discharge none                   Discharge Plan       Row Name 03/17/25 1333       Plan    Plan Home, family to transport.    Patient/Family in Agreement with Plan yes    Plan Comments CCP met with pt at the bedside, introduced self and role of CCP. Face sheet information and pharmacy verified. Pt lives in a tri-level house with 2STE with her spouse. Pt still drives, IADL's and denies DME at home. Pt has a living will. Pt is enrolled in meds to beds and denies trouble affording or managing her medications. Pt denies HH or SNF history. DC plan is to return home, family to transport. Gerardo RN/CCP                       Demographic Summary       Row Name 03/17/25 3943       General Information    Admission Type inpatient    Arrived From home    Required Notices Provided Important Message from Medicare    Referral Source admission list;case finding    Reason for Consult discharge planning    Preferred Language English       Contact Information    Permission Granted to Share Info With                     Functional Status       Row Name 03/17/25 1333       Functional Status    Usual Activity Tolerance excellent    Current Activity Tolerance good       Assessment of Health Literacy    How often do you have someone help you read hospital materials? Never    How often do you have problems learning about your medical condition because of difficulty understanding written information? Never    How often do you have a problem understanding what is told to you about your medical condition? Never    How confident are you filling out medical forms by yourself? Extremely    Health Literacy Excellent       Functional Status, IADL    Medications independent    Meal Preparation independent    Housekeeping independent    Laundry independent    Shopping independent                               Hailey Mina, RN

## 2025-03-17 NOTE — PROGRESS NOTES
Consult Daily Progress Note  [facility]   03/17/25      Patient Name:  Lacey Ramirez  MRN:  [id number]   YOB: 1955  Age: 69 y.o.  Sex: female  LOS: 3    Reason for Consult:  Large right pleural effusion    Hospital Course:   69-year-old female with a history of cirrhosis who presented with chest pain and shortness of breath and found to have large right-sided pleural effusion in addition to pneumonia for which pulmonary was consulted.    Interval History:  No acute events overnight  Feels more short of breath today  Mild cough without sputum production  No nausea or vomiting  No chest pain    Physical Exam:  Vitals:    03/17/25 1340   BP: 123/56   Pulse: 88   Resp: 18   Temp: 97.5 °F (36.4 °C)   SpO2: 93%       Intake/Output    Intake/Output Summary (Last 24 hours) at 3/17/2025 1648  Last data filed at 3/17/2025 1423  Gross per 24 hour   Intake 240 ml   Output --   Net 240 ml       General: Alert, nontoxic, NAD  HEENT: NC/AT, EOMI, MMM  Neck: Supple, trachea midline  Cardiac: RRR, no murmur, gallops, rubs  Pulmonary: Diminished at right base  GI: Soft, non-tender, non-distended, normal bowel sounds  Extremities: Warm, well perfused, no LE edema  Skin: no visible rash  Neuro: CN II - XII grossly intact  Psychiatry: Normal mood and affect      Data Review:  Results from last 7 days   Lab Units 03/17/25  0425 03/16/25  1348 03/15/25  0320 03/14/25  1717   WBC 10*3/mm3 6.48 7.41 10.85*  10.55 10.21   HEMOGLOBIN g/dL 10.0* 10.9* 11.9*  11.5* 13.0   PLATELETS 10*3/mm3 91* 99* 130*  128* 142     Results from last 7 days   Lab Units 03/17/25  1438 03/17/25  0619 03/17/25  0425 03/16/25  1348 03/15/25  1711 03/15/25  0629 03/15/25  0320 03/14/25  1717   SODIUM mmol/L  --   --  139 138  --  133* 133* 138   POTASSIUM mmol/L 4.5  --  3.5 3.9 3.8 3.3* 3.4* 3.2*   CHLORIDE mmol/L  --   --  103 101  --  96* 96* 98   CO2 mmol/L  --   --  25.5 26.6  --  29.8* 28.4 30.9*   BUN mg/dL  --   --  13  17  --  20 19 15   CREATININE mg/dL  --   --  0.73 0.92  --  1.37* 1.22* 1.37*   GLUCOSE mg/dL  --   --  138* 140*  --  103* 121* 132*   CALCIUM mg/dL  --   --  8.2* 8.3*  --  7.7* 7.5* 7.8*   MAGNESIUM mg/dL  --  2.3  --  2.2  --   --   --   --    Estimated Creatinine Clearance: 95.4 mL/min (by C-G formula based on SCr of 0.73 mg/dL).    Results from last 7 days   Lab Units 03/17/25  0425 03/16/25  1348 03/15/25  0629 03/15/25  0320 03/15/25  0023 03/14/25  1824 03/14/25  1717   LDH U/L  --   --   --  235*  --   --   --    AST (SGOT) U/L  --   --   --  61*  --   --  68*   ALT (SGPT) U/L  --   --   --  28  --   --  34*   PROCALCITONIN ng/mL  --   --   --   --   --   --  0.28*   LACTATE mmol/L  --   --  1.5 2.4* 2.7*   < >  --    PLATELETS 10*3/mm3 91* 99*  --  130*  128*  --   --  142    < > = values in this interval not displayed.             Result Review:  I have personally reviewed the results from the time of this admission to 3/17/2025 16:48 EDT and agree with these findings:  [x]  Laboratory list / accordion  [x]  Microbiology  [x]  Radiology  []  EKG/Telemetry   [x]  Cardiology/Vascular   []  Pathology  [x]  Old records  []  Other:    Imaging:  Reviewed chest images personally from past 3 days    ASSESSMENT  /  PLAN:    Large right pleural effusion  Thoracentesis (3/16)-1900 cc removed, transudative  Pneumonia  Cirrhosis  Thrombocytopenia  Transaminitis  Acute kidney injury  Hypertension  Prediabetes with hyperglycemia    -Patient presented to the hospital with shortness of breath and found to have large right pleural effusion for which she underwent thoracentesis today.  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  -Post Thora chest x-ray demonstrates significant improvement in the right pleural effusion and mild fluid in the minor fissure.  -Low suspicion for pneumonia, will discontinue ceftriaxone at this time.  -Patient will need optimization of her cirrhosis to prevent recurrent  episodes of hepatic hydrothorax  -Ongoing diuresis with Lasix  -Trial DuoNebs  -Will repeat chest x-ray tomorrow morning to assess if fluid is reaccumulating  -Patient is establishing care at [facility] from [place].  She has a PCP appointment in early April and has an EGD/colonoscopy scheduled with her [place] GI doctor second week of April.  She will need optimization of her cirrhosis.  -Remainder of care as per hospitalist primary    Thank you for allowing us to participate in this patients care. Pulmonary will continue to follow.     Aleksandr Rosales MD  [facility]  Pulmonary and Critical Care Medicine, Interventional Pulmonology    Parts of this note may be an electronic transcription/translation of spoken language to printed text using the Dragon dictation system.

## 2025-03-17 NOTE — NURSING NOTE
0535: Pt complaining about chest pain, Stat EKG, troponin and magnesium ordered. No changes, EKG showed normal sinus rhythm

## 2025-03-17 NOTE — PROGRESS NOTES
"   LOS: 3 days     Chief Complaint/ Reason for encounter: ELTON, cirrhosis    Subjective   03/17/25 : She is doing well today denies complaints.  No fevers chills shortness of breath chest pain nausea or vomiting  Good oral intake, no dysuria  Minimal edema      Medical history reviewed:  History of Present Illness    Subjective    History taken from: Chart and patient/family as able    Vital Signs  Temp:  [97.7 °F (36.5 °C)-97.9 °F (36.6 °C)] 97.7 °F (36.5 °C)  Heart Rate:  [] 91  Resp:  [18] 18  BP: (116-136)/(50-65) 136/64       Wt Readings from Last 1 Encounters:   03/14/25 2353 114 kg (251 lb 6.4 oz)   03/14/25 1708 111 kg (244 lb 11.4 oz)       Objective:  Vital signs: (most recent): Blood pressure 136/64, pulse 91, temperature 97.7 °F (36.5 °C), temperature source Oral, resp. rate 18, height 168.9 cm (66.5\"), weight 114 kg (251 lb 6.4 oz), SpO2 96%, not currently breastfeeding.                Objective:  General Appearance:  Comfortable, well, obese-appearing, in no acute distress and not in pain.  HEENT: Mucous membranes moist  Lungs:  Normal effort and normal respiratory rate.  Breath sounds clear to auscultation: No rhonchi/Rales.  No  respiratory distress.   Heart:  S1, S2 normal.  No murmur.   Abdomen: Abdomen is soft, nontender/nondistended, + bowel sounds  Extremities: 1+ edema of bilateral lower extremities  Skin:  Warm and dry with no rashes      Results Review:    Intake/Output:     Intake/Output Summary (Last 24 hours) at 3/17/2025 1323  Last data filed at 3/17/2025 0812  Gross per 24 hour   Intake 240 ml   Output --   Net 240 ml         DATA:  Radiology and Labs:  The following labs independently reviewed by me. Additional labs ordered for tomorrow a.m.  Interval notes, chart personally reviewed by me.   Old records independently reviewed showing baseline creatinine around 0.8  The following radiologic studies independently viewed by me, findings renal ultrasound showed small right kidney " normal left kidney no obstruction noted no calculus  New problems include cirrhosis  Discussed with patient herself at bedside    Risk/ complexity of medical care/ medical decision making moderate complexity, ELTON management    Labs:   Recent Results (from the past 24 hours)   CBC (No Diff)    Collection Time: 03/16/25  1:48 PM    Specimen: Blood   Result Value Ref Range    WBC 7.41 3.40 - 10.80 10*3/mm3    RBC 3.19 (L) 3.77 - 5.28 10*6/mm3    Hemoglobin 10.9 (L) 12.0 - 15.9 g/dL    Hematocrit 31.3 (L) 34.0 - 46.6 %    MCV 98.1 (H) 79.0 - 97.0 fL    MCH 34.2 (H) 26.6 - 33.0 pg    MCHC 34.8 31.5 - 35.7 g/dL    RDW 14.1 12.3 - 15.4 %    RDW-SD 51.4 37.0 - 54.0 fl    MPV 10.0 6.0 - 12.0 fL    Platelets 99 (L) 140 - 450 10*3/mm3   Basic Metabolic Panel    Collection Time: 03/16/25  1:48 PM    Specimen: Blood   Result Value Ref Range    Glucose 140 (H) 65 - 99 mg/dL    BUN 17 8 - 23 mg/dL    Creatinine 0.92 0.57 - 1.00 mg/dL    Sodium 138 136 - 145 mmol/L    Potassium 3.9 3.5 - 5.2 mmol/L    Chloride 101 98 - 107 mmol/L    CO2 26.6 22.0 - 29.0 mmol/L    Calcium 8.3 (L) 8.6 - 10.5 mg/dL    BUN/Creatinine Ratio 18.5 7.0 - 25.0    Anion Gap 10.4 5.0 - 15.0 mmol/L    eGFR 68.0 >60.0 mL/min/1.73   Protime-INR    Collection Time: 03/16/25  1:48 PM    Specimen: Blood   Result Value Ref Range    Protime 23.9 (H) 11.7 - 14.2 Seconds    INR 2.12 (H) 0.90 - 1.10   Magnesium    Collection Time: 03/16/25  1:48 PM    Specimen: Blood   Result Value Ref Range    Magnesium 2.2 1.6 - 2.4 mg/dL   CBC (No Diff)    Collection Time: 03/17/25  4:25 AM    Specimen: Hand, Left; Blood   Result Value Ref Range    WBC 6.48 3.40 - 10.80 10*3/mm3    RBC 2.96 (L) 3.77 - 5.28 10*6/mm3    Hemoglobin 10.0 (L) 12.0 - 15.9 g/dL    Hematocrit 28.6 (L) 34.0 - 46.6 %    MCV 96.6 79.0 - 97.0 fL    MCH 33.8 (H) 26.6 - 33.0 pg    MCHC 35.0 31.5 - 35.7 g/dL    RDW 14.2 12.3 - 15.4 %    RDW-SD 49.5 37.0 - 54.0 fl    MPV 10.6 6.0 - 12.0 fL    Platelets 91 (L) 140 -  450 10*3/mm3   Basic Metabolic Panel    Collection Time: 03/17/25  4:25 AM    Specimen: Hand, Left; Blood   Result Value Ref Range    Glucose 138 (H) 65 - 99 mg/dL    BUN 13 8 - 23 mg/dL    Creatinine 0.73 0.57 - 1.00 mg/dL    Sodium 139 136 - 145 mmol/L    Potassium 3.5 3.5 - 5.2 mmol/L    Chloride 103 98 - 107 mmol/L    CO2 25.5 22.0 - 29.0 mmol/L    Calcium 8.2 (L) 8.6 - 10.5 mg/dL    BUN/Creatinine Ratio 17.8 7.0 - 25.0    Anion Gap 10.5 5.0 - 15.0 mmol/L    eGFR 89.7 >60.0 mL/min/1.73   ECG 12 Lead Chest Pain    Collection Time: 03/17/25  5:51 AM   Result Value Ref Range    QT Interval 384 ms    QTC Interval 471 ms   Magnesium    Collection Time: 03/17/25  6:19 AM    Specimen: Hand, Left; Blood   Result Value Ref Range    Magnesium 2.3 1.6 - 2.4 mg/dL   High Sensitivity Troponin T    Collection Time: 03/17/25  6:19 AM    Specimen: Hand, Left; Blood   Result Value Ref Range    HS Troponin T 11 <14 ng/L   High Sensitivity Troponin T 1Hr    Collection Time: 03/17/25  7:20 AM    Specimen: Blood   Result Value Ref Range    HS Troponin T 10 <14 ng/L    Troponin T Numeric Delta -1 Abnormal if >/=3 ng/L       Radiology:  Pertinent radiology studies were reviewed as described above      Medications have been reviewed separately in chart review medication tab      ASSESSMENT:  Acute kidney injury, urine studies were prerenal but improved with IV albumin infusion.  Creatinine is back to baseline today, hepatorenal syndrome seems less likely at this point  Hypoxia requiring supplemental oxygen secondary to right-sided pleural effusion, status post thoracentesis  Large pleural effusion post thoracentesis  Liver cirrhosis with ascites /portal hypertension  Obesity  Thrombocytopenia  Prediabetes       Plan  Her renal function is improved, stable and near baseline today  Excellent urine output, and negative fluid balance  Resume diuretics, start with Lasix 40 mg daily  Continue to hold lisinopril with recent hypotension  Renal  ultrasound shows no obstruction but did have small right kidney, recent UA bland  Follow-up repeat chemistries in a.m.      Richmond Dickson MD  [facility]   Office phone number: [phone]  Answering service phone number: [phone]    03/17/25  13:23 EDT    Dictation performed using Dragon dictation software

## 2025-03-17 NOTE — PROGRESS NOTES
Name: Lacey Ramirez ADMIT: 3/14/2025   : 1955  PCP: Johanny Hall MD    MRN: [id number] LOS: 3 days   AGE/SEX: 69 y.o. female  ROOM: [facility]     Subjective   Subjective   Patient seen at bedside, patient is lying comfortably in bed.       Objective   Objective   Vital Signs  Temp:  [97.5 °F (36.4 °C)-97.9 °F (36.6 °C)] 97.5 °F (36.4 °C)  Heart Rate:  [] 88  Resp:  [18] 18  BP: (116-136)/(50-64) 123/56  SpO2:  [91 %-96 %] 93 %  on  Flow (L/min) (Oxygen Therapy):  [2] 2;   Device (Oxygen Therapy): nasal cannula  Body mass index is 39.97 kg/m².  Physical Exam  General, awake and alert.  Appears sick on chronic basis  Head and ENT, normocephalic and atraumatic.  Lungs, symmetric expansion, equal air entry bilaterally.  Heart, regular rate and rhythm.  Abdomen, soft and nontender.  Extremities, no clubbing or cyanosis.  Neuro, no focal deficits.  Skin: Warm and no rash.  Psych, normal mood and affect.  Musculoskeletal, joint examination is grossly normal.      Results Review     I reviewed the patient's new clinical results.  Results from last 7 days   Lab Units 25  0425 25  1348 03/15/25  0320 25  1717   WBC 10*3/mm3 6.48 7.41 10.85*  10.55 10.21   HEMOGLOBIN g/dL 10.0* 10.9* 11.9*  11.5* 13.0   PLATELETS 10*3/mm3 91* 99* 130*  128* 142     Results from last 7 days   Lab Units 25  1438 25  0425 25  1348 03/15/25  1711 03/15/25  0629 03/15/25  0320   SODIUM mmol/L  --  139 138  --  133* 133*   POTASSIUM mmol/L 4.5 3.5 3.9 3.8 3.3* 3.4*   CHLORIDE mmol/L  --  103 101  --  96* 96*   CO2 mmol/L  --  25.5 26.6  --  29.8* 28.4   BUN mg/dL  --  13 17  --  20 19   CREATININE mg/dL  --  0.73 0.92  --  1.37* 1.22*   GLUCOSE mg/dL  --  138* 140*  --  103* 121*   EGFR mL/min/1.73  --  89.7 68.0  --  42.1* 48.4*     Results from last 7 days   Lab Units 03/15/25  0320 25  1717   ALBUMIN g/dL 1.9* 2.1*   BILIRUBIN mg/dL 3.7* 4.5*   ALK PHOS U/L 205* 220*   AST (SGOT)  "U/L 61* 68*   ALT (SGPT) U/L 28 34*     Results from last 7 days   Lab Units 03/17/25  0619 03/17/25  0425 03/16/25  1348 03/15/25  0629 03/15/25  0320 03/14/25  1717   CALCIUM mg/dL  --  8.2* 8.3* 7.7* 7.5* 7.8*   ALBUMIN g/dL  --   --   --   --  1.9* 2.1*   MAGNESIUM mg/dL 2.3  --  2.2  --   --   --      Results from last 7 days   Lab Units 03/15/25  0629 03/15/25  0320 03/15/25  0023 03/14/25  1824 03/14/25  1717   PROCALCITONIN ng/mL  --   --   --   --  0.28*   LACTATE mmol/L 1.5 2.4* 2.7* 4.2*  --      No results found for: \"HGBA1C\", \"POCGLU\"    No radiology results for the last day    I have personally reviewed all medications:  Scheduled Medications  aspirin, 325 mg, Oral, Once  furosemide, 40 mg, Oral, Daily  guaiFENesin, 1,200 mg, Oral, Q12H  midodrine, 5 mg, Oral, TID AC  pantoprazole, 40 mg, Oral, QAM AC  senna-docusate sodium, 1 tablet, Oral, BID    Infusions   Diet  Diet: Cardiac, Diabetic; Healthy Heart (2-3 Na+); Consistent Carbohydrate; Fluid Consistency: Thin (IDDSI 0)    I have personally reviewed:  [x]  Laboratory   [x]  Microbiology   [x]  Radiology   [x]  EKG/Telemetry  [x]  Cardiology/Vascular   []  Pathology    []  Records       Assessment/Plan     Active Hospital Problems    Diagnosis  POA    **Pneumonia involving right lung [J18.9]  Yes    Pleural effusion [J90]  Yes    Cirrhosis [K74.60]  Yes    Thrombocytopenia [D69.6]  Yes    Transaminitis [R74.01]  Yes    Anemia [D64.9]  Yes    Benign essential hypertension [I10]  Yes    Hyperlipidemia [E78.5]  Yes      Resolved Hospital Problems   No resolved problems to display.       69 y.o. female admitted with Pneumonia involving right lung.    Assessment and plan  1.  Large right pleural effusion, patient was found to have associated shortness of breath and she underwent thoracentesis.  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  Pulmonary on board, follow management recommendations.    2.  Suspected pneumonia, was on " Rocephin.  Antibiotic discontinued.    3.  Acute kidney injury, now resolved.    4.  Hypotension, continue midodrine.    5.  CODE STATUS is full code.  Further plans based on hospital course.      Expected Discharge Date: 3/19/2025; Expected Discharge Time:       Hernán Daugherty MD  [facility]  03/17/25  16:55 EDT

## 2025-03-18 ENCOUNTER — APPOINTMENT (OUTPATIENT)
Dept: GENERAL RADIOLOGY | Facility: HOSPITAL | Age: 70
DRG: 186 | End: 2025-03-18
Payer: MEDICARE

## 2025-03-18 LAB
ALBUMIN SERPL-MCNC: 3.1 G/DL (ref 3.5–5.2)
ALBUMIN/GLOB SERPL: 1.2 G/DL
ALP SERPL-CCNC: 151 U/L (ref 39–117)
ALT SERPL W P-5'-P-CCNC: 24 U/L (ref 1–33)
ANION GAP SERPL CALCULATED.3IONS-SCNC: 8 MMOL/L (ref 5–15)
AST SERPL-CCNC: 56 U/L (ref 1–32)
BILIRUB SERPL-MCNC: 5 MG/DL (ref 0–1.2)
BUN SERPL-MCNC: 11 MG/DL (ref 8–23)
BUN/CREAT SERPL: 17.5 (ref 7–25)
CALCIUM SPEC-SCNC: 8.3 MG/DL (ref 8.6–10.5)
CHLORIDE SERPL-SCNC: 104 MMOL/L (ref 98–107)
CO2 SERPL-SCNC: 25 MMOL/L (ref 22–29)
CREAT SERPL-MCNC: 0.63 MG/DL (ref 0.57–1)
CYTO UR: NORMAL
DEPRECATED RDW RBC AUTO: 46.6 FL (ref 37–54)
EGFRCR SERPLBLD CKD-EPI 2021: 96.8 ML/MIN/1.73
ERYTHROCYTE [DISTWIDTH] IN BLOOD BY AUTOMATED COUNT: 13.7 % (ref 12.3–15.4)
GLOBULIN UR ELPH-MCNC: 2.5 GM/DL
GLUCOSE SERPL-MCNC: 99 MG/DL (ref 65–99)
HCT VFR BLD AUTO: 32.2 % (ref 34–46.6)
HGB BLD-MCNC: 11.7 G/DL (ref 12–15.9)
LAB AP CASE REPORT: NORMAL
MCH RBC QN AUTO: 34.1 PG (ref 26.6–33)
MCHC RBC AUTO-ENTMCNC: 36.3 G/DL (ref 31.5–35.7)
MCV RBC AUTO: 93.9 FL (ref 79–97)
PATH REPORT.FINAL DX SPEC: NORMAL
PATH REPORT.GROSS SPEC: NORMAL
PLATELET # BLD AUTO: 98 10*3/MM3 (ref 140–450)
PMV BLD AUTO: 10.4 FL (ref 6–12)
POTASSIUM SERPL-SCNC: 4.3 MMOL/L (ref 3.5–5.2)
PROT SERPL-MCNC: 5.6 G/DL (ref 6–8.5)
RBC # BLD AUTO: 3.43 10*6/MM3 (ref 3.77–5.28)
SODIUM SERPL-SCNC: 137 MMOL/L (ref 136–145)
WBC NRBC COR # BLD AUTO: 6.45 10*3/MM3 (ref 3.4–10.8)

## 2025-03-18 PROCEDURE — 85027 COMPLETE CBC AUTOMATED: CPT | Performed by: INTERNAL MEDICINE

## 2025-03-18 PROCEDURE — 71045 X-RAY EXAM CHEST 1 VIEW: CPT

## 2025-03-18 PROCEDURE — 80053 COMPREHEN METABOLIC PANEL: CPT | Performed by: INTERNAL MEDICINE

## 2025-03-18 RX ADMIN — PANTOPRAZOLE SODIUM 40 MG: 40 TABLET, DELAYED RELEASE ORAL at 08:56

## 2025-03-18 RX ADMIN — HYDROCODONE BITARTRATE AND ACETAMINOPHEN 1 TABLET: 7.5; 325 TABLET ORAL at 02:55

## 2025-03-18 RX ADMIN — SENNOSIDES AND DOCUSATE SODIUM 1 TABLET: 50; 8.6 TABLET ORAL at 08:56

## 2025-03-18 RX ADMIN — BENZONATATE 200 MG: 100 CAPSULE ORAL at 19:50

## 2025-03-18 RX ADMIN — BENZONATATE 200 MG: 100 CAPSULE ORAL at 06:00

## 2025-03-18 RX ADMIN — MIDODRINE HYDROCHLORIDE 5 MG: 5 TABLET ORAL at 18:06

## 2025-03-18 RX ADMIN — FUROSEMIDE 40 MG: 40 TABLET ORAL at 08:56

## 2025-03-18 RX ADMIN — HYDROCODONE BITARTRATE AND ACETAMINOPHEN 1 TABLET: 7.5; 325 TABLET ORAL at 13:34

## 2025-03-18 RX ADMIN — MIDODRINE HYDROCHLORIDE 5 MG: 5 TABLET ORAL at 08:56

## 2025-03-18 NOTE — PROGRESS NOTES
Consult Daily Progress Note  [facility]   03/18/25      Patient Name:  Lacey Ramirez  MRN:  [id number]   YOB: 1955  Age: 69 y.o.  Sex: female  LOS: 4    Reason for Consult:  Large right pleural effusion    Hospital Course:   69-year-old female with a history of cirrhosis who presented with chest pain and shortness of breath and found to have large right-sided pleural effusion in addition to pneumonia for which pulmonary was consulted.    Interval History:  No acute events overnight  Respiratory status remained stable  Continues to have some shortness of breath unchanged from previously  No chest pain  Mild cough    Physical Exam:  Vitals:    03/18/25 1346   BP: 117/75   Pulse: 89   Resp: 18   Temp: 98.4 °F (36.9 °C)   SpO2: 95%       Intake/Output    Intake/Output Summary (Last 24 hours) at 3/18/2025 1439  Last data filed at 3/18/2025 0700  Gross per 24 hour   Intake --   Output 600 ml   Net -600 ml       General: Alert, nontoxic, NAD  HEENT: NC/AT, EOMI, MMM  Neck: Supple, trachea midline  Cardiac: RRR, no murmur, gallops, rubs  Pulmonary: Diminished in right lung field  GI: Soft, non-tender, non-distended, normal bowel sounds  Extremities: Warm, well perfused, no LE edema  Skin: no visible rash  Neuro: CN II - XII grossly intact  Psychiatry: Normal mood and affect      Data Review:  Results from last 7 days   Lab Units 03/18/25  0400 03/17/25  0425 03/16/25  1348 03/15/25  0320 03/14/25  1717   WBC 10*3/mm3 6.45 6.48 7.41 10.85*  10.55 10.21   HEMOGLOBIN g/dL 11.7* 10.0* 10.9* 11.9*  11.5* 13.0   PLATELETS 10*3/mm3 98* 91* 99* 130*  128* 142     Results from last 7 days   Lab Units 03/18/25  0400 03/17/25  1438 03/17/25  0619 03/17/25  0425 03/16/25  1348 03/15/25  1711 03/15/25  0629 03/15/25  0320 03/14/25  1717   SODIUM mmol/L 137  --   --  139 138  --  133* 133* 138   POTASSIUM mmol/L 4.3 4.5  --  3.5 3.9 3.8 3.3* 3.4* 3.2*   CHLORIDE mmol/L 104  --   --  103 101  --  96*  96* 98   CO2 mmol/L 25.0  --   --  25.5 26.6  --  29.8* 28.4 30.9*   BUN mg/dL 11  --   --  13 17  --  20 19 15   CREATININE mg/dL 0.63  --   --  0.73 0.92  --  1.37* 1.22* 1.37*   GLUCOSE mg/dL 99  --   --  138* 140*  --  103* 121* 132*   CALCIUM mg/dL 8.3*  --   --  8.2* 8.3*  --  7.7* 7.5* 7.8*   MAGNESIUM mg/dL  --   --  2.3  --  2.2  --   --   --   --    Estimated Creatinine Clearance: 110.5 mL/min (by C-G formula based on SCr of 0.63 mg/dL).    Results from last 7 days   Lab Units 03/18/25  0400 03/17/25  0425 03/16/25  1348 03/15/25  0629 03/15/25  0320 03/15/25  0023 03/14/25  1824 03/14/25  1717   LDH U/L  --   --   --   --  235*  --   --   --    AST (SGOT) U/L 56*  --   --   --  61*  --   --  68*   ALT (SGPT) U/L 24  --   --   --  28  --   --  34*   PROCALCITONIN ng/mL  --   --   --   --   --   --   --  0.28*   LACTATE mmol/L  --   --   --  1.5 2.4* 2.7*   < >  --    PLATELETS 10*3/mm3 98* 91* 99*  --  130*  128*  --   --  142    < > = values in this interval not displayed.             Result Review:  I have personally reviewed the results from the time of this admission to 3/18/2025 14:39 EDT and agree with these findings:  [x]  Laboratory list / accordion  [x]  Microbiology  [x]  Radiology  []  EKG/Telemetry   [x]  Cardiology/Vascular   []  Pathology  [x]  Old records  []  Other:    Imaging:  Reviewed chest images personally from past 3 days    ASSESSMENT  /  PLAN:    Large right pleural effusion  Thoracentesis (3/16)-1900 cc removed, transudative  Pneumonia  Cirrhosis  Thrombocytopenia  Transaminitis  Acute kidney injury  Hypertension  Prediabetes with hyperglycemia    -Patient presented to the hospital with shortness of breath and found to have large right pleural effusion for which she underwent thoracentesis.  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  -Post Thora chest x-ray demonstrated significant improvement in the right pleural effusion and mild fluid in the minor  fissure.  -Repeat chest x-ray today demonstrates reaccumulation of right pleural effusion.  At this time patient symptoms are overall quite well-controlled and she remains comfortable on room air with appropriate saturations.  Would not recommend repeat thoracentesis at this time as fluid reaccumulation will continue to happen.  Unfortunately this is common in cirrhotic patients where any developing ascites will translocate across the diaphragm due to negative intrathoracic pressure created during normal ventilation.  Repeated thoracenteses or placement of Pleurx catheter will only result in significant protein losses and malnutrition.  Ultimately she will need to have her cirrhosis optimized and overload state addressed with diuretics.  Ultimately may need a TIPS in the future.  I would recommend repeat thoracentesis only in the setting of significant respiratory distress.  -Low suspicion for pneumonia, will discontinue ceftriaxone at this time.  -Patient is establishing care at [facility] from [place].  She has a PCP appointment in early April and has an EGD/colonoscopy scheduled with her [place] GI doctor second week of April.  She will need optimization of her cirrhosis.  -Remainder of care as per hospitalist primary  -Nothing else to add from a pulmonary standpoint.    Thank you for allowing us to participate in this patients care.  Pulmonary will sign off at this time.  Please contact us if further questions or concerns arise.    Aleksandr Rosales MD  [facility]  Pulmonary and Critical Care Medicine, Interventional Pulmonology    Parts of this note may be an electronic transcription/translation of spoken language to printed text using the Dragon dictation system.

## 2025-03-18 NOTE — PROGRESS NOTES
Name: Lacey Ramirez ADMIT: 3/14/2025   : 1955  PCP: Johanny Hall MD    MRN: [id number] LOS: 4 days   AGE/SEX: 69 y.o. female  ROOM: [facility]     Subjective   Subjective   Patient seen at bedside, no acute issues noted.       Objective   Objective   Vital Signs  Temp:  [97.5 °F (36.4 °C)-98.4 °F (36.9 °C)] 98.4 °F (36.9 °C)  Heart Rate:  [] 89  Resp:  [18] 18  BP: (117-141)/(65-75) 117/75  SpO2:  [91 %-95 %] 95 %  on   ;   Device (Oxygen Therapy): room air  Body mass index is 39.97 kg/m².  Physical Exam  General, awake and alert.  Appears sick on chronic basis  Head and ENT, normocephalic and atraumatic.  Lungs, symmetric expansion, equal air entry bilaterally.  Heart, regular rate and rhythm.  Abdomen, soft and nontender.  Extremities, no clubbing or cyanosis.  Neuro, no focal deficits.  Skin: Warm and no rash.  Psych, normal mood and affect.  Musculoskeletal, joint examination is grossly normal.    Copied text material from yesterday's note has been reviewed for appropriate changes and remains accurate as of 3/18/25.      Results Review     I reviewed the patient's new clinical results.  Results from last 7 days   Lab Units 25  0400 25  0425 25  1348 03/15/25  0320   WBC 10*3/mm3 6.45 6.48 7.41 10.85*  10.55   HEMOGLOBIN g/dL 11.7* 10.0* 10.9* 11.9*  11.5*   PLATELETS 10*3/mm3 98* 91* 99* 130*  128*     Results from last 7 days   Lab Units 25  0400 25  1438 25  0425 25  1348 03/15/25  1711 03/15/25  0629   SODIUM mmol/L 137  --  139 138  --  133*   POTASSIUM mmol/L 4.3 4.5 3.5 3.9   < > 3.3*   CHLORIDE mmol/L 104  --  103 101  --  96*   CO2 mmol/L 25.0  --  25.5 26.6  --  29.8*   BUN mg/dL 11  --  13 17  --  20   CREATININE mg/dL 0.63  --  0.73 0.92  --  1.37*   GLUCOSE mg/dL 99  --  138* 140*  --  103*   EGFR mL/min/1.73 96.8  --  89.7 68.0  --  42.1*    < > = values in this interval not displayed.     Results from last 7 days   Lab Units  "03/18/25  0400 03/15/25  0320 03/14/25  1717   ALBUMIN g/dL 3.1* 1.9* 2.1*   BILIRUBIN mg/dL 5.0* 3.7* 4.5*   ALK PHOS U/L 151* 205* 220*   AST (SGOT) U/L 56* 61* 68*   ALT (SGPT) U/L 24 28 34*     Results from last 7 days   Lab Units 03/18/25  0400 03/17/25  0619 03/17/25  0425 03/16/25  1348 03/15/25  0629 03/15/25  0320 03/14/25  1717   CALCIUM mg/dL 8.3*  --  8.2* 8.3* 7.7* 7.5* 7.8*   ALBUMIN g/dL 3.1*  --   --   --   --  1.9* 2.1*   MAGNESIUM mg/dL  --  2.3  --  2.2  --   --   --      Results from last 7 days   Lab Units 03/15/25  0629 03/15/25  0320 03/15/25  0023 03/14/25  1824 03/14/25  1717   PROCALCITONIN ng/mL  --   --   --   --  0.28*   LACTATE mmol/L 1.5 2.4* 2.7* 4.2*  --      No results found for: \"HGBA1C\", \"POCGLU\"    XR Chest 1 View  Result Date: 3/18/2025  As above    This report was finalized on 3/18/2025 6:35 AM by Dr. Richmond Alvarez M.D on Workstation: [id number]        I have personally reviewed all medications:  Scheduled Medications  aspirin, 325 mg, Oral, Once  furosemide, 40 mg, Oral, Daily  guaiFENesin, 1,200 mg, Oral, Q12H  ipratropium-albuterol, 3 mL, Nebulization, Q6H While Awake - RT  midodrine, 5 mg, Oral, TID AC  pantoprazole, 40 mg, Oral, QAM AC  senna-docusate sodium, 1 tablet, Oral, BID    Infusions   Diet  Diet: Cardiac, Diabetic; Healthy Heart (2-3 Na+); Consistent Carbohydrate; Fluid Consistency: Thin (IDDSI 0)    I have personally reviewed:  [x]  Laboratory   [x]  Microbiology   [x]  Radiology   [x]  EKG/Telemetry  [x]  Cardiology/Vascular   []  Pathology    []  Records       Assessment/Plan     Active Hospital Problems    Diagnosis  POA    **Pneumonia involving right lung [J18.9]  Yes    Pleural effusion [J90]  Yes    Cirrhosis [K74.60]  Yes    Thrombocytopenia [D69.6]  Yes    Transaminitis [R74.01]  Yes    Anemia [D64.9]  Yes    Benign essential hypertension [I10]  Yes    Hyperlipidemia [E78.5]  Yes      Resolved Hospital Problems   No resolved problems to display. "       69 y.o. female admitted with Pneumonia involving right lung.    Assessment and plan  1.  Large right pleural effusion, patient was found to have associated shortness of breath and she underwent thoracentesis.  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  Pulmonary on board, follow management recommendations.     2.  Suspected pneumonia, was on Rocephin.  Antibiotic discontinued.     3.  Acute kidney injury, now resolved.     4.  Hypotension, continue midodrine.     5.  CODE STATUS is full code.  Further plans based on hospital course.           Expected Discharge Date: 3/19/2025; Expected Discharge Time:       Hernán Daugherty MD  [facility]  03/18/25  18:00 EDT

## 2025-03-18 NOTE — PLAN OF CARE
Problem: Adult Inpatient Plan of Care  Goal: Absence of Hospital-Acquired Illness or Injury  Intervention: Prevent Skin Injury  Recent Flowsheet Documentation  Taken 3/18/2025 1408 by Henrik Ramirez RN  Body Position: position changed independently  Taken 3/18/2025 0858 by Henrik Ramirez RN  Body Position:   lower extremity elevated   turned   position changed independently     Problem: Adult Inpatient Plan of Care  Goal: Absence of Hospital-Acquired Illness or Injury  Intervention: Identify and Manage Fall Risk  Recent Flowsheet Documentation  Taken 3/18/2025 1408 by Henrik Ramirez RN  Safety Promotion/Fall Prevention:   activity supervised   room organization consistent   safety round/check completed  Taken 3/18/2025 0858 by Henrik Ramirez RN  Safety Promotion/Fall Prevention:   safety round/check completed   room organization consistent   assistive device/personal items within reach   activity supervised     Problem: Adult Inpatient Plan of Care  Goal: Absence of Hospital-Acquired Illness or Injury  Intervention: Prevent Infection  Recent Flowsheet Documentation  Taken 3/18/2025 1408 by Henrik Ramirez RN  Infection Prevention: single patient room provided  Taken 3/18/2025 0858 by Henrik Ramirez RN  Infection Prevention: single patient room provided   Goal Outcome Evaluation:  Plan of Care Reviewed With: patient           Outcome Evaluation: Patient AO x 4 VSS in room air during the day up  B&B continent up on the chair will continue monitor

## 2025-03-18 NOTE — PLAN OF CARE
Problem: Adult Inpatient Plan of Care  Goal: Plan of Care Review  Outcome: Progressing  Flowsheets (Taken 3/18/2025 0305)  Progress: improving  Outcome Evaluation: Pt AxOx4, medications given as scheduled, productive cough, up in chair, 2L NC, VSS, continue plan of care  Plan of Care Reviewed With: patient  Goal: Patient-Specific Goal (Individualized)  Outcome: Progressing  Goal: Absence of Hospital-Acquired Illness or Injury  Outcome: Progressing  Intervention: Identify and Manage Fall Risk  Recent Flowsheet Documentation  Taken 3/18/2025 0034 by Lakesha Willis RN  Safety Promotion/Fall Prevention: activity supervised  Taken 3/17/2025 2214 by Lakesha Willis RN  Safety Promotion/Fall Prevention:   safety round/check completed   activity supervised   assistive device/personal items within reach   clutter free environment maintained   fall prevention program maintained   nonskid shoes/slippers when out of bed  Taken 3/17/2025 2010 by Lakesha Willis RN  Safety Promotion/Fall Prevention:   safety round/check completed   activity supervised   assistive device/personal items within reach   clutter free environment maintained   fall prevention program maintained   nonskid shoes/slippers when out of bed  Intervention: Prevent Skin Injury  Recent Flowsheet Documentation  Taken 3/18/2025 0034 by Lakesha Willis RN  Body Position: sitting up in bed  Taken 3/17/2025 2010 by Lakesha Willis RN  Body Position: position changed independently  Intervention: Prevent Infection  Recent Flowsheet Documentation  Taken 3/17/2025 2214 by Lakesha Willis RN  Infection Prevention: environmental surveillance performed  Taken 3/17/2025 2010 by Lakesha Willis RN  Infection Prevention: environmental surveillance performed  Goal: Optimal Comfort and Wellbeing  Outcome: Progressing  Intervention: Provide Person-Centered Care  Recent Flowsheet Documentation  Taken 3/18/2025 0034 by Lakesha Willis RN  Trust Relationship/Rapport:   care  explained   choices provided   emotional support provided   empathic listening provided   questions answered   questions encouraged   reassurance provided   thoughts/feelings acknowledged  Taken 3/17/2025 2214 by Lakesha Willis RN  Trust Relationship/Rapport:   care explained   choices provided   emotional support provided   empathic listening provided   questions answered   questions encouraged   reassurance provided   thoughts/feelings acknowledged  Taken 3/17/2025 2010 by Lakesha Willis RN  Trust Relationship/Rapport:   care explained   choices provided   emotional support provided   empathic listening provided   questions answered   questions encouraged   reassurance provided   thoughts/feelings acknowledged  Goal: Readiness for Transition of Care  Outcome: Progressing     Problem: Comorbidity Management  Goal: Blood Pressure in Desired Range  Outcome: Progressing  Intervention: Maintain Blood Pressure Management  Recent Flowsheet Documentation  Taken 3/18/2025 0034 by Lakesha Willis RN  Medication Review/Management: medications reviewed  Taken 3/17/2025 2214 by Lakesha Willis RN  Medication Review/Management: medications reviewed  Taken 3/17/2025 2010 by Lakesha Willis RN  Medication Review/Management: medications reviewed     Problem: Fall Injury Risk  Goal: Absence of Fall and Fall-Related Injury  Outcome: Progressing  Intervention: Identify and Manage Contributors  Recent Flowsheet Documentation  Taken 3/18/2025 0034 by Lakesha Willis RN  Medication Review/Management: medications reviewed  Taken 3/17/2025 2214 by Lakesha Willis RN  Medication Review/Management: medications reviewed  Taken 3/17/2025 2010 by Lakesha Willis RN  Medication Review/Management: medications reviewed  Intervention: Promote Injury-Free Environment  Recent Flowsheet Documentation  Taken 3/18/2025 0034 by Lakesha Willis RN  Safety Promotion/Fall Prevention: activity supervised  Taken 3/17/2025 2214 by Lakesha Willis, RN  Safety  Promotion/Fall Prevention:   safety round/check completed   activity supervised   assistive device/personal items within reach   clutter free environment maintained   fall prevention program maintained   nonskid shoes/slippers when out of bed  Taken 3/17/2025 2010 by Lakesha Willis, RN  Safety Promotion/Fall Prevention:   safety round/check completed   activity supervised   assistive device/personal items within reach   clutter free environment maintained   fall prevention program maintained   nonskid shoes/slippers when out of bed   Goal Outcome Evaluation:  Plan of Care Reviewed With: patient        Progress: improving  Outcome Evaluation: Pt AxOx4, medications given as scheduled, productive cough, up in chair, 2L NC, VSS, continue plan of care

## 2025-03-18 NOTE — PROGRESS NOTES
"   LOS: 4 days     Chief Complaint/ Reason for encounter: ELTON, cirrhosis    Subjective   03/17/25 : She is doing well today denies complaints.  No fevers chills shortness of breath chest pain nausea or vomiting  Good oral intake, no dysuria  Minimal edema    3/18: Doing well today no complaints, good urine output with Lasix  No edema, no increased shortness of breath.  No chest pain  Good appetite with no nausea or vomiting    Medical history reviewed:  History of Present Illness    Subjective    History taken from: Chart and patient/family as able    Vital Signs  Temp:  [97.5 °F (36.4 °C)-97.7 °F (36.5 °C)] 97.5 °F (36.4 °C)  Heart Rate:  [75-99] 99  Resp:  [18] 18  BP: (123-141)/(56-68) 141/66       Wt Readings from Last 1 Encounters:   03/14/25 2353 114 kg (251 lb 6.4 oz)   03/14/25 1708 111 kg (244 lb 11.4 oz)       Objective:  Vital signs: (most recent): Blood pressure 141/66, pulse 99, temperature 97.5 °F (36.4 °C), temperature source Oral, resp. rate 18, height 168.9 cm (66.5\"), weight 114 kg (251 lb 6.4 oz), SpO2 93%, not currently breastfeeding.                Objective:  General Appearance:  Comfortable, well, obese-appearing, in no acute distress and not in pain.  HEENT: Mucous membranes moist  Lungs:  Normal effort and normal respiratory rate.  Breath sounds clear to auscultation: No rhonchi/Rales.  No  respiratory distress.   Heart:  S1, S2 normal.  No murmur.   Abdomen: Abdomen is soft, nontender/nondistended, + bowel sounds  Extremities: Decreased, trace edema of bilateral lower extremities  Skin:  Warm and dry with no rashes      Results Review:    Intake/Output:     Intake/Output Summary (Last 24 hours) at 3/18/2025 0855  Last data filed at 3/18/2025 0700  Gross per 24 hour   Intake 120 ml   Output 600 ml   Net -480 ml         DATA:  Radiology and Labs:  The following labs independently reviewed by me. Additional labs ordered for tomorrow a.m.  Interval notes, chart personally reviewed by me.   Old " records independently reviewed showing baseline creatinine around 0.8  The following radiologic studies independently viewed by me, findings renal ultrasound showed small right kidney normal left kidney no obstruction noted no calculus  Discussed with patient herself at bedside    Risk/ complexity of medical care/ medical decision making moderate complexity, ELTON management    Labs:   Recent Results (from the past 24 hours)   Potassium    Collection Time: 03/17/25  2:38 PM    Specimen: Blood   Result Value Ref Range    Potassium 4.5 3.5 - 5.2 mmol/L   CBC (No Diff)    Collection Time: 03/18/25  4:00 AM    Specimen: Blood   Result Value Ref Range    WBC 6.45 3.40 - 10.80 10*3/mm3    RBC 3.43 (L) 3.77 - 5.28 10*6/mm3    Hemoglobin 11.7 (L) 12.0 - 15.9 g/dL    Hematocrit 32.2 (L) 34.0 - 46.6 %    MCV 93.9 79.0 - 97.0 fL    MCH 34.1 (H) 26.6 - 33.0 pg    MCHC 36.3 (H) 31.5 - 35.7 g/dL    RDW 13.7 12.3 - 15.4 %    RDW-SD 46.6 37.0 - 54.0 fl    MPV 10.4 6.0 - 12.0 fL    Platelets 98 (L) 140 - 450 10*3/mm3   Comprehensive Metabolic Panel    Collection Time: 03/18/25  4:00 AM    Specimen: Blood   Result Value Ref Range    Glucose 99 65 - 99 mg/dL    BUN 11 8 - 23 mg/dL    Creatinine 0.63 0.57 - 1.00 mg/dL    Sodium 137 136 - 145 mmol/L    Potassium 4.3 3.5 - 5.2 mmol/L    Chloride 104 98 - 107 mmol/L    CO2 25.0 22.0 - 29.0 mmol/L    Calcium 8.3 (L) 8.6 - 10.5 mg/dL    Total Protein 5.6 (L) 6.0 - 8.5 g/dL    Albumin 3.1 (L) 3.5 - 5.2 g/dL    ALT (SGPT) 24 1 - 33 U/L    AST (SGOT) 56 (H) 1 - 32 U/L    Alkaline Phosphatase 151 (H) 39 - 117 U/L    Total Bilirubin 5.0 (H) 0.0 - 1.2 mg/dL    Globulin 2.5 gm/dL    A/G Ratio 1.2 g/dL    BUN/Creatinine Ratio 17.5 7.0 - 25.0    Anion Gap 8.0 5.0 - 15.0 mmol/L    eGFR 96.8 >60.0 mL/min/1.73       Radiology:  Pertinent radiology studies were reviewed as described above      Medications have been reviewed separately in chart review medication tab      ASSESSMENT:  Acute kidney injury,  urine studies were prerenal but improved with IV albumin infusion.  Creatinine is back to baseline today, hepatorenal syndrome seems less likely at this point  Hypoxia requiring supplemental oxygen secondary to right-sided pleural effusion, status post thoracentesis  Large pleural effusion post thoracentesis  Liver cirrhosis with ascites /portal hypertension  Obesity  Thrombocytopenia  Prediabetes       Plan  Her renal function remains excellent after restarting her Lasix  Creatinine 0.6 which is her baseline.  Volume and electrolytes at goal today  Excellent urine output, and negative fluid balance  Continue Lasix 40 mg daily  Blood pressure seems to be fairly well-controlled so we will continue to hold lisinopril  Renal ultrasound showed no obstruction but did have small right kidney, recent UA bland  Follow-up repeat chemistries in a.m.  Repeat chest x-ray shows increasing large right pleural effusion, await recommendations per pulmonary, left lung otherwise clear      Richmond Dickson MD  [facility]   Office phone number: [phone]  Answering service phone number: [phone]    03/18/25  08:55 EDT    Dictation performed using Dragon dictation software

## 2025-03-18 NOTE — PROGRESS NOTES
Enter Query Response Below      Query Response:   Pneumonia ruled in.     Electronically signed by Hernán Daugherty MD, 03/18/25, 5:58 PM EDT.               If applicable, please update the problem list.

## 2025-03-19 LAB
ALBUMIN SERPL-MCNC: 3.1 G/DL (ref 3.5–5.2)
ALBUMIN/GLOB SERPL: 1.1 G/DL
ALP SERPL-CCNC: 156 U/L (ref 39–117)
ALT SERPL W P-5'-P-CCNC: 29 U/L (ref 1–33)
ANION GAP SERPL CALCULATED.3IONS-SCNC: 4.5 MMOL/L (ref 5–15)
AST SERPL-CCNC: 61 U/L (ref 1–32)
BACTERIA SPEC AEROBE CULT: NORMAL
BACTERIA SPEC AEROBE CULT: NORMAL
BILIRUB SERPL-MCNC: 4.4 MG/DL (ref 0–1.2)
BUN SERPL-MCNC: 10 MG/DL (ref 8–23)
BUN/CREAT SERPL: 14.3 (ref 7–25)
CALCIUM SPEC-SCNC: 8.3 MG/DL (ref 8.6–10.5)
CHLORIDE SERPL-SCNC: 104 MMOL/L (ref 98–107)
CO2 SERPL-SCNC: 29.5 MMOL/L (ref 22–29)
CREAT SERPL-MCNC: 0.7 MG/DL (ref 0.57–1)
DEPRECATED RDW RBC AUTO: 45.9 FL (ref 37–54)
EGFRCR SERPLBLD CKD-EPI 2021: 94.3 ML/MIN/1.73
ERYTHROCYTE [DISTWIDTH] IN BLOOD BY AUTOMATED COUNT: 13.6 % (ref 12.3–15.4)
GLOBULIN UR ELPH-MCNC: 2.7 GM/DL
GLUCOSE SERPL-MCNC: 89 MG/DL (ref 65–99)
HCT VFR BLD AUTO: 32.2 % (ref 34–46.6)
HGB BLD-MCNC: 11.8 G/DL (ref 12–15.9)
MCH RBC QN AUTO: 34.4 PG (ref 26.6–33)
MCHC RBC AUTO-ENTMCNC: 36.6 G/DL (ref 31.5–35.7)
MCV RBC AUTO: 93.9 FL (ref 79–97)
PLATELET # BLD AUTO: 101 10*3/MM3 (ref 140–450)
PMV BLD AUTO: 10.1 FL (ref 6–12)
POTASSIUM SERPL-SCNC: 4.2 MMOL/L (ref 3.5–5.2)
PROT SERPL-MCNC: 5.8 G/DL (ref 6–8.5)
RBC # BLD AUTO: 3.43 10*6/MM3 (ref 3.77–5.28)
SODIUM SERPL-SCNC: 138 MMOL/L (ref 136–145)
WBC NRBC COR # BLD AUTO: 6.91 10*3/MM3 (ref 3.4–10.8)

## 2025-03-19 PROCEDURE — 85027 COMPLETE CBC AUTOMATED: CPT | Performed by: INTERNAL MEDICINE

## 2025-03-19 PROCEDURE — 80053 COMPREHEN METABOLIC PANEL: CPT | Performed by: INTERNAL MEDICINE

## 2025-03-19 RX ADMIN — HYDROCODONE BITARTRATE AND ACETAMINOPHEN 1 TABLET: 7.5; 325 TABLET ORAL at 09:44

## 2025-03-19 RX ADMIN — MIDODRINE HYDROCHLORIDE 5 MG: 5 TABLET ORAL at 11:50

## 2025-03-19 RX ADMIN — PANTOPRAZOLE SODIUM 40 MG: 40 TABLET, DELAYED RELEASE ORAL at 08:02

## 2025-03-19 RX ADMIN — MIDODRINE HYDROCHLORIDE 5 MG: 5 TABLET ORAL at 17:40

## 2025-03-19 RX ADMIN — MIDODRINE HYDROCHLORIDE 5 MG: 5 TABLET ORAL at 08:02

## 2025-03-19 RX ADMIN — FUROSEMIDE 40 MG: 40 TABLET ORAL at 08:02

## 2025-03-19 NOTE — PROGRESS NOTES
Enter Query Response Below      Query Response:   Bacterial pneumonia     Electronically signed by Hernán Daugherty MD, 03/19/25, 5:50 PM EDT.               If applicable, please update the problem list.

## 2025-03-19 NOTE — PLAN OF CARE
Problem: Adult Inpatient Plan of Care  Goal: Plan of Care Review  3/19/2025 1535 by Lorena Jacobs, RN  Flowsheets (Taken 3/19/2025 1535)  Outcome Evaluation: AOX4. Room air. Ad jack. Medicated prn for pain to back. Call light within reach.   Goal Outcome Evaluation:              Outcome Evaluation: AOX4. Room air. Ad jack. Medicated prn for pain to back. Call light within reach.

## 2025-03-19 NOTE — PROGRESS NOTES
"   LOS: 5 days     Chief Complaint/ Reason for encounter: ELTON, cirrhosis    Subjective   03/17/25 : She is doing well today denies complaints.  No fevers chills shortness of breath chest pain nausea or vomiting  Good oral intake, no dysuria  Minimal edema    3/18: Doing well today no complaints, good urine output with Lasix  No edema, no increased shortness of breath.  No chest pain  Good appetite with no nausea or vomiting    3/19 no acute events. Feelin ok today. Edema about the same. She reports she takes more lasix at home.     Medical history reviewed:  History of Present Illness    Subjective    History taken from: Chart and patient/family as able    Vital Signs  Temp:  [97.5 °F (36.4 °C)-98.4 °F (36.9 °C)] 97.5 °F (36.4 °C)  Heart Rate:  [] 93  Resp:  [18] 18  BP: (117-135)/(61-75) 133/71       Wt Readings from Last 1 Encounters:   03/14/25 2353 114 kg (251 lb 6.4 oz)   03/14/25 1708 111 kg (244 lb 11.4 oz)       Objective:  Vital signs: (most recent): Blood pressure 133/71, pulse 93, temperature 97.5 °F (36.4 °C), temperature source Oral, resp. rate 18, height 168.9 cm (66.5\"), weight 114 kg (251 lb 6.4 oz), SpO2 93%, not currently breastfeeding.                Objective:  General Appearance:  Comfortable, well, obese-appearing, in no acute distress and not in pain.  HEENT: Mucous membranes moist  Lungs:  Normal effort and normal respiratory rate.  Breath sounds clear to auscultation: No rhonchi/Rales.  No  respiratory distress.   Heart:  S1, S2 normal.  No murmur.   Abdomen: Abdomen is soft, nontender/nondistended, + bowel sounds  Extremities: Decreased, trace edema of bilateral lower extremities  Skin:  Warm and dry with no rashes      Results Review:    Intake/Output:     Intake/Output Summary (Last 24 hours) at 3/19/2025 0875  Last data filed at 3/18/2025 1135  Gross per 24 hour   Intake 480 ml   Output --   Net 480 ml         DATA:  Radiology and Labs:  The following labs independently reviewed by " me. Additional labs ordered for tomorrow a.m.  Interval notes, chart personally reviewed by me.   Old records independently reviewed showing baseline creatinine around 0.8  The following radiologic studies independently viewed by me, findings renal ultrasound showed small right kidney normal left kidney no obstruction noted no calculus  Discussed with patient herself at bedside    Risk/ complexity of medical care/ medical decision making moderate complexity, ELTON management    Labs:   Recent Results (from the past 24 hours)   CBC (No Diff)    Collection Time: 03/19/25  4:17 AM    Specimen: Blood   Result Value Ref Range    WBC 6.91 3.40 - 10.80 10*3/mm3    RBC 3.43 (L) 3.77 - 5.28 10*6/mm3    Hemoglobin 11.8 (L) 12.0 - 15.9 g/dL    Hematocrit 32.2 (L) 34.0 - 46.6 %    MCV 93.9 79.0 - 97.0 fL    MCH 34.4 (H) 26.6 - 33.0 pg    MCHC 36.6 (H) 31.5 - 35.7 g/dL    RDW 13.6 12.3 - 15.4 %    RDW-SD 45.9 37.0 - 54.0 fl    MPV 10.1 6.0 - 12.0 fL    Platelets 101 (L) 140 - 450 10*3/mm3   Comprehensive Metabolic Panel    Collection Time: 03/19/25  4:17 AM    Specimen: Blood   Result Value Ref Range    Glucose 89 65 - 99 mg/dL    BUN 10 8 - 23 mg/dL    Creatinine 0.70 0.57 - 1.00 mg/dL    Sodium 138 136 - 145 mmol/L    Potassium 4.2 3.5 - 5.2 mmol/L    Chloride 104 98 - 107 mmol/L    CO2 29.5 (H) 22.0 - 29.0 mmol/L    Calcium 8.3 (L) 8.6 - 10.5 mg/dL    Total Protein 5.8 (L) 6.0 - 8.5 g/dL    Albumin 3.1 (L) 3.5 - 5.2 g/dL    ALT (SGPT) 29 1 - 33 U/L    AST (SGOT) 61 (H) 1 - 32 U/L    Alkaline Phosphatase 156 (H) 39 - 117 U/L    Total Bilirubin 4.4 (H) 0.0 - 1.2 mg/dL    Globulin 2.7 gm/dL    A/G Ratio 1.1 g/dL    BUN/Creatinine Ratio 14.3 7.0 - 25.0    Anion Gap 4.5 (L) 5.0 - 15.0 mmol/L    eGFR 94.3 >60.0 mL/min/1.73       Radiology:  Pertinent radiology studies were reviewed as described above      Medications have been reviewed separately in chart review medication tab      ASSESSMENT:  Acute kidney injury, urine studies were  prerenal but improved with IV albumin infusion.  Creatinine is back to baseline today, hepatorenal syndrome seems less likely at this point  Hypoxia requiring supplemental oxygen secondary to right-sided pleural effusion, status post thoracentesis  Large pleural effusion post thoracentesis  Liver cirrhosis with ascites /portal hypertension  Obesity  Thrombocytopenia  Prediabetes       Plan  Renal function remains stable.   Electrolytes at goal   Now with mild metabolic alkalosis- monitor for now  Continue Lasix 40 mg daily, if more diuresis is needed, would increase frequency (40 mg BID).   Blood pressure seems to be fairly well-controlled so we will continue to hold lisinopril  Renal ultrasound showed no obstruction but did have small right kidney, recent UA bland  Follow-up repeat chemistries in a.m.  Concerns for hepatic hydrothorax. Pulm recommending no further thoracentesis as long as she is stable. Also recommending not placing pleurx catheter.       Madeline Prince MD  [facility]   Office phone number: [phone]  Answering service phone number: [phone]    03/19/25  08:53 EDT

## 2025-03-20 ENCOUNTER — READMISSION MANAGEMENT (OUTPATIENT)
Dept: CALL CENTER | Facility: HOSPITAL | Age: 70
End: 2025-03-20
Payer: MEDICARE

## 2025-03-20 VITALS
HEART RATE: 92 BPM | WEIGHT: 251.4 LBS | HEIGHT: 67 IN | DIASTOLIC BLOOD PRESSURE: 56 MMHG | OXYGEN SATURATION: 94 % | SYSTOLIC BLOOD PRESSURE: 124 MMHG | RESPIRATION RATE: 18 BRPM | BODY MASS INDEX: 39.46 KG/M2 | TEMPERATURE: 97.9 F

## 2025-03-20 LAB
ALBUMIN SERPL-MCNC: 2.9 G/DL (ref 3.5–5.2)
ALBUMIN/GLOB SERPL: 1 G/DL
ALP SERPL-CCNC: 164 U/L (ref 39–117)
ALT SERPL W P-5'-P-CCNC: 31 U/L (ref 1–33)
ANION GAP SERPL CALCULATED.3IONS-SCNC: 5 MMOL/L (ref 5–15)
AST SERPL-CCNC: 72 U/L (ref 1–32)
BILIRUB SERPL-MCNC: 4.3 MG/DL (ref 0–1.2)
BUN SERPL-MCNC: 11 MG/DL (ref 8–23)
BUN/CREAT SERPL: 19 (ref 7–25)
CALCIUM SPEC-SCNC: 8.3 MG/DL (ref 8.6–10.5)
CHLORIDE SERPL-SCNC: 104 MMOL/L (ref 98–107)
CO2 SERPL-SCNC: 26 MMOL/L (ref 22–29)
CREAT SERPL-MCNC: 0.58 MG/DL (ref 0.57–1)
DEPRECATED RDW RBC AUTO: 47.2 FL (ref 37–54)
EGFRCR SERPLBLD CKD-EPI 2021: 98.7 ML/MIN/1.73
ERYTHROCYTE [DISTWIDTH] IN BLOOD BY AUTOMATED COUNT: 13.9 % (ref 12.3–15.4)
GLOBULIN UR ELPH-MCNC: 2.8 GM/DL
GLUCOSE SERPL-MCNC: 84 MG/DL (ref 65–99)
HCT VFR BLD AUTO: 33.2 % (ref 34–46.6)
HGB BLD-MCNC: 11.9 G/DL (ref 12–15.9)
MCH RBC QN AUTO: 33.8 PG (ref 26.6–33)
MCHC RBC AUTO-ENTMCNC: 35.8 G/DL (ref 31.5–35.7)
MCV RBC AUTO: 94.3 FL (ref 79–97)
PLATELET # BLD AUTO: 103 10*3/MM3 (ref 140–450)
PMV BLD AUTO: 10.4 FL (ref 6–12)
POTASSIUM SERPL-SCNC: 4.2 MMOL/L (ref 3.5–5.2)
PROT SERPL-MCNC: 5.7 G/DL (ref 6–8.5)
RBC # BLD AUTO: 3.52 10*6/MM3 (ref 3.77–5.28)
SODIUM SERPL-SCNC: 135 MMOL/L (ref 136–145)
WBC NRBC COR # BLD AUTO: 7.26 10*3/MM3 (ref 3.4–10.8)

## 2025-03-20 PROCEDURE — 85027 COMPLETE CBC AUTOMATED: CPT | Performed by: INTERNAL MEDICINE

## 2025-03-20 PROCEDURE — 80053 COMPREHEN METABOLIC PANEL: CPT | Performed by: INTERNAL MEDICINE

## 2025-03-20 RX ADMIN — HYDROCODONE BITARTRATE AND ACETAMINOPHEN 1 TABLET: 7.5; 325 TABLET ORAL at 08:19

## 2025-03-20 RX ADMIN — PANTOPRAZOLE SODIUM 40 MG: 40 TABLET, DELAYED RELEASE ORAL at 06:30

## 2025-03-20 RX ADMIN — SENNOSIDES AND DOCUSATE SODIUM 1 TABLET: 50; 8.6 TABLET ORAL at 08:15

## 2025-03-20 RX ADMIN — BENZONATATE 200 MG: 100 CAPSULE ORAL at 01:58

## 2025-03-20 RX ADMIN — MIDODRINE HYDROCHLORIDE 5 MG: 5 TABLET ORAL at 06:30

## 2025-03-20 RX ADMIN — FUROSEMIDE 40 MG: 40 TABLET ORAL at 08:15

## 2025-03-20 RX ADMIN — MIDODRINE HYDROCHLORIDE 5 MG: 5 TABLET ORAL at 13:11

## 2025-03-20 NOTE — PLAN OF CARE
Problem: Adult Inpatient Plan of Care  Goal: Absence of Hospital-Acquired Illness or Injury  Intervention: Identify and Manage Fall Risk  Recent Flowsheet Documentation  Taken 3/20/2025 1438 by Henrik Ramirez RN  Safety Promotion/Fall Prevention:   safety round/check completed   room organization consistent   activity supervised   assistive device/personal items within reach  Taken 3/20/2025 1200 by Henrik Ramirez RN  Safety Promotion/Fall Prevention:   activity supervised   assistive device/personal items within reach   room organization consistent   safety round/check completed  Taken 3/20/2025 0819 by Henrik Ramriez RN  Safety Promotion/Fall Prevention:   safety round/check completed   room organization consistent   activity supervised   assistive device/personal items within reach   nonskid shoes/slippers when out of bed     Problem: Adult Inpatient Plan of Care  Goal: Absence of Hospital-Acquired Illness or Injury  Intervention: Prevent Skin Injury  Recent Flowsheet Documentation  Taken 3/20/2025 1438 by Henrik Ramirez RN  Body Position: lower extremity elevated  Taken 3/20/2025 1200 by Henrik Ramirez RN  Body Position:   lower extremity elevated   supine  Taken 3/20/2025 0819 by Henrik Ramirez RN  Body Position: upper extremity elevated   Goal Outcome Evaluation:  Plan of Care Reviewed With: patient        Progress: improving  Outcome Evaluation: Patient AO x 4 , VSS able to ambulate independently, will be discharge today good appetite, give discharge instructions

## 2025-03-20 NOTE — CASE MANAGEMENT/SOCIAL WORK
Case Management Discharge Note      Final Note: Home, no additional CCP needs.         Selected Continued Care - Admitted Since 3/14/2025       Destination    No services have been selected for the patient.                Durable Medical Equipment    No services have been selected for the patient.                Dialysis/Infusion    No services have been selected for the patient.                Home Medical Care    No services have been selected for the patient.                Therapy    No services have been selected for the patient.                Community Resources    No services have been selected for the patient.                Community & DME    No services have been selected for the patient.                         Final Discharge Disposition Code: 01 - home or self-care

## 2025-03-20 NOTE — PLAN OF CARE
Goal Outcome Evaluation:  Plan of Care Reviewed With: patient           Outcome Evaluation: A&Ox4. NSR. 2L NC while sleeping. Adlib. No acute events overnight.

## 2025-03-20 NOTE — PROGRESS NOTES
Name: Lacey Ramirez ADMIT: 3/14/2025   : 1955  PCP: Johanny Hall MD    MRN: [id number] LOS: 5 days   AGE/SEX: 69 y.o. female  ROOM: [facility]     Subjective   Subjective   Patient seen at bedside.       Objective   Objective   Vital Signs  Temp:  [97.5 °F (36.4 °C)-98.2 °F (36.8 °C)] 97.9 °F (36.6 °C)  Heart Rate:  [85-95] 95  Resp:  [18] 18  BP: (130-135)/(61-71) 130/65  SpO2:  [78 %-96 %] 94 %  on  Flow (L/min) (Oxygen Therapy):  [1-2] 2;   Device (Oxygen Therapy): room air  Body mass index is 39.97 kg/m².  Physical Exam  eneral, awake and alert.  Appears sick on chronic basis  Head and ENT, normocephalic and atraumatic.  Lungs, symmetric expansion, equal air entry bilaterally.  Heart, regular rate and rhythm.  Abdomen, soft and nontender.  Extremities, no clubbing or cyanosis.  Neuro, no focal deficits.  Skin: Warm and no rash.  Psych, normal mood and affect.  Musculoskeletal, joint examination is grossly normal.     Copied text material from yesterday's note has been reviewed for appropriate changes and remains accurate as of 3/19/25.      Results Review     I reviewed the patient's new clinical results.  Results from last 7 days   Lab Units 25  0400 25  0425 25  1348   WBC 10*3/mm3 6.91 6.45 6.48 7.41   HEMOGLOBIN g/dL 11.8* 11.7* 10.0* 10.9*   PLATELETS 10*3/mm3 101* 98* 91* 99*     Results from last 7 days   Lab Units 25  04125  0400 25  1438 25  0425 25  1348   SODIUM mmol/L 138 137  --  139 138   POTASSIUM mmol/L 4.2 4.3 4.5 3.5 3.9   CHLORIDE mmol/L 104 104  --  103 101   CO2 mmol/L 29.5* 25.0  --  25.5 26.6   BUN mg/dL 10 11  --  13 17   CREATININE mg/dL 0.70 0.63  --  0.73 0.92   GLUCOSE mg/dL 89 99  --  138* 140*   EGFR mL/min/1.73 94.3 96.8  --  89.7 68.0     Results from last 7 days   Lab Units 25  0417 25  0400 03/15/25  0320 25  1717   ALBUMIN g/dL 3.1* 3.1* 1.9* 2.1*   BILIRUBIN mg/dL 4.4* 5.0* 3.7*  "4.5*   ALK PHOS U/L 156* 151* 205* 220*   AST (SGOT) U/L 61* 56* 61* 68*   ALT (SGPT) U/L 29 24 28 34*     Results from last 7 days   Lab Units 03/19/25  0417 03/18/25  0400 03/17/25  0619 03/17/25  0425 03/16/25  1348 03/15/25  0629 03/15/25  0320 03/14/25  1717   CALCIUM mg/dL 8.3* 8.3*  --  8.2* 8.3*   < > 7.5* 7.8*   ALBUMIN g/dL 3.1* 3.1*  --   --   --   --  1.9* 2.1*   MAGNESIUM mg/dL  --   --  2.3  --  2.2  --   --   --     < > = values in this interval not displayed.     Results from last 7 days   Lab Units 03/15/25  0629 03/15/25  0320 03/15/25  0023 03/14/25  1824 03/14/25  1717   PROCALCITONIN ng/mL  --   --   --   --  0.28*   LACTATE mmol/L 1.5 2.4* 2.7* 4.2*  --      No results found for: \"HGBA1C\", \"POCGLU\"    XR Chest 1 View  Result Date: 3/18/2025  As above    This report was finalized on 3/18/2025 6:35 AM by Dr. Richmond Alvarez M.D on Workstation: [id number]        I have personally reviewed all medications:  Scheduled Medications  aspirin, 325 mg, Oral, Once  furosemide, 40 mg, Oral, Daily  guaiFENesin, 1,200 mg, Oral, Q12H  ipratropium-albuterol, 3 mL, Nebulization, Q6H While Awake - RT  midodrine, 5 mg, Oral, TID AC  pantoprazole, 40 mg, Oral, QAM AC  senna-docusate sodium, 1 tablet, Oral, BID    Infusions   Diet  Diet: Cardiac, Diabetic; Healthy Heart (2-3 Na+); Consistent Carbohydrate; Fluid Consistency: Thin (IDDSI 0)    I have personally reviewed:  [x]  Laboratory   [x]  Microbiology   [x]  Radiology   [x]  EKG/Telemetry  [x]  Cardiology/Vascular   []  Pathology    []  Records       Assessment/Plan     Active Hospital Problems    Diagnosis  POA    **Pneumonia involving right lung [J18.9]  Yes    Pleural effusion [J90]  Yes    Cirrhosis [K74.60]  Yes    Thrombocytopenia [D69.6]  Yes    Transaminitis [R74.01]  Yes    Anemia [D64.9]  Yes    Benign essential hypertension [I10]  Yes    Hyperlipidemia [E78.5]  Yes      Resolved Hospital Problems   No resolved problems to display.       69 y.o. " female admitted with Pneumonia involving right lung.    Assessment and plan  1.  Large right pleural effusion, patient was found to have associated shortness of breath and she underwent thoracentesis.  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  Pulmonary on board, follow management recommendations.     2.  Suspected pneumonia, was on Rocephin.  Antibiotic discontinued.     3.  Acute kidney injury, now resolved.     4.  Hypotension, continue midodrine.     5.  CODE STATUS is full code.  Further plans based on hospital course.          Hernán Daugherty MD  [facility]  03/19/25  20:06 EDT

## 2025-03-20 NOTE — PROGRESS NOTES
"   LOS: 6 days     Chief Complaint/ Reason for encounter: ELTON, cirrhosis    Subjective   03/17/25 : She is doing well today denies complaints.  No fevers chills shortness of breath chest pain nausea or vomiting  Good oral intake, no dysuria  Minimal edema    3/18: Doing well today no complaints, good urine output with Lasix  No edema, no increased shortness of breath.  No chest pain  Good appetite with no nausea or vomiting    3/19 no acute events. Feelin ok today. Edema about the same. She reports she takes more lasix at home.     3/20: No new complaints or events she looks and feels well good appetite no nausea vomiting  Decreased shortness of breath    Medical history reviewed:  History of Present Illness    Subjective    History taken from: Chart and patient/family as able    Vital Signs  Temp:  [97.7 °F (36.5 °C)-98.1 °F (36.7 °C)] 97.9 °F (36.6 °C)  Heart Rate:  [] 92  Resp:  [18] 18  BP: (124-135)/(54-61) 124/56       Wt Readings from Last 1 Encounters:   03/14/25 2353 114 kg (251 lb 6.4 oz)   03/14/25 1708 111 kg (244 lb 11.4 oz)       Objective:  Vital signs: (most recent): Blood pressure 124/56, pulse 92, temperature 97.9 °F (36.6 °C), temperature source Oral, resp. rate 18, height 168.9 cm (66.5\"), weight 114 kg (251 lb 6.4 oz), SpO2 94%, not currently breastfeeding.                Objective:  General Appearance:  Comfortable, well, obese-appearing, in no acute distress and not in pain.  HEENT: Mucous membranes moist  Lungs:  Normal effort and normal respiratory rate.  Breath sounds clear to auscultation: No rhonchi/Rales.  No  respiratory distress.   Heart:  S1, S2 normal.  No murmur.   Abdomen: Abdomen is soft, nontender/nondistended, + bowel sounds  Extremities: Decreased, trace edema of bilateral lower extremities  Skin:  Warm and dry with no rashes      Results Review:    Intake/Output:     Intake/Output Summary (Last 24 hours) at 3/20/2025 1534  Last data filed at 3/20/2025 1300  Gross per 24 " hour   Intake 960 ml   Output --   Net 960 ml         DATA:  Radiology and Labs:  The following labs independently reviewed by me. Additional labs ordered for tomorrow a.m.  Interval notes, chart personally reviewed by me.   Old records independently reviewed showing baseline creatinine around 0.8  Discussed with patient herself at bedside    Risk/ complexity of medical care/ medical decision making moderate complexity, ELTON management    Labs:   Recent Results (from the past 24 hours)   CBC (No Diff)    Collection Time: 03/20/25  4:19 AM    Specimen: Blood   Result Value Ref Range    WBC 7.26 3.40 - 10.80 10*3/mm3    RBC 3.52 (L) 3.77 - 5.28 10*6/mm3    Hemoglobin 11.9 (L) 12.0 - 15.9 g/dL    Hematocrit 33.2 (L) 34.0 - 46.6 %    MCV 94.3 79.0 - 97.0 fL    MCH 33.8 (H) 26.6 - 33.0 pg    MCHC 35.8 (H) 31.5 - 35.7 g/dL    RDW 13.9 12.3 - 15.4 %    RDW-SD 47.2 37.0 - 54.0 fl    MPV 10.4 6.0 - 12.0 fL    Platelets 103 (L) 140 - 450 10*3/mm3   Comprehensive Metabolic Panel    Collection Time: 03/20/25  4:19 AM    Specimen: Blood   Result Value Ref Range    Glucose 84 65 - 99 mg/dL    BUN 11 8 - 23 mg/dL    Creatinine 0.58 0.57 - 1.00 mg/dL    Sodium 135 (L) 136 - 145 mmol/L    Potassium 4.2 3.5 - 5.2 mmol/L    Chloride 104 98 - 107 mmol/L    CO2 26.0 22.0 - 29.0 mmol/L    Calcium 8.3 (L) 8.6 - 10.5 mg/dL    Total Protein 5.7 (L) 6.0 - 8.5 g/dL    Albumin 2.9 (L) 3.5 - 5.2 g/dL    ALT (SGPT) 31 1 - 33 U/L    AST (SGOT) 72 (H) 1 - 32 U/L    Alkaline Phosphatase 164 (H) 39 - 117 U/L    Total Bilirubin 4.3 (H) 0.0 - 1.2 mg/dL    Globulin 2.8 gm/dL    A/G Ratio 1.0 g/dL    BUN/Creatinine Ratio 19.0 7.0 - 25.0    Anion Gap 5.0 5.0 - 15.0 mmol/L    eGFR 98.7 >60.0 mL/min/1.73       Radiology:  Pertinent radiology studies were reviewed as described above      Medications have been reviewed separately in chart review medication tab      ASSESSMENT:  Acute kidney injury, urine studies were prerenal but improved with IV albumin  infusion.  Creatinine is back to baseline today, hepatorenal syndrome seems less likely at this point  Hypoxia requiring supplemental oxygen secondary to right-sided pleural effusion, status post thoracentesis  Large pleural effusion post thoracentesis  Liver cirrhosis with ascites /portal hypertension  Obesity  Thrombocytopenia  Prediabetes       Plan  Renal function remains stable and near baseline, BP at goal  Electrolytes at goal   Now with mild metabolic alkalosis- monitor for now  Continue Lasix 40 mg daily, can increase back to her home dose of 80 mg at discharge  May resume low-dose oral lisinopril at discharge    Renal ultrasound showed no obstruction but did have small right kidney, recent UA bland  Follow-up repeat chemistries in a.m.  Concerns for hepatic hydrothorax. Pulm recommending no further thoracentesis as long as she is stable. Also recommending not placing pleurx catheter.       Richmond Dickson MD  [facility]   Office phone number: [phone]  Answering service phone number: [phone]    03/20/25  15:34 EDT

## 2025-03-20 NOTE — DISCHARGE SUMMARY
Date of Discharge:  3/20/2025    PCP: Johanny Hall MD    Discharge Diagnosis:   Active Hospital Problems    Diagnosis  POA    **Pneumonia involving right lung [J18.9]  Yes    Pleural effusion [J90]  Yes    Cirrhosis [K74.60]  Yes    Thrombocytopenia [D69.6]  Yes    Transaminitis [R74.01]  Yes    Anemia [D64.9]  Yes    Benign essential hypertension [I10]  Yes    Hyperlipidemia [E78.5]  Yes      Resolved Hospital Problems   No resolved problems to display.          Consults       Date and Time Order Name Status Description    3/14/2025 11:49 PM Inpatient Nephrology Consult Completed     3/14/2025 10:16 PM Inpatient Pulmonology Consult Completed     3/14/2025  8:00 PM [facility] (on-call MD unless specified) Details      3/14/2025  7:35 PM [facility] (on-call MD unless specified) Details      2/24/2025 10:57 AM Inpatient Gastroenterology Consult Completed           Hospital Course  Patient is a 69 y.o. female with past medical history significant for cirrhosis of liver, hypertension, hyperlipidemia, presents to the hospital with dyspnea, patient had large right-sided pleural effusion, patient was found to have associated shortness of breath and she underwent thoracentesis with relief in symptoms.  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  Pulmonary had evaluated the patient.  Repeat chest x-ray, demonstrates reaccumulation of right pleural effusion, pulmonary does not recommend repeat thoracentesis at this point of time.  Ultimately she may need a TIPS procedure in the future, for which we would recommend her to follow-up with PCP on outpatient basis to make a hepatology referral to a tertiary care facility.  I have seen and examined patient at bedside, total time spent on discharge management is more than 30 minutes.  Plan of care was discussed with the patient and patient has verbalized understanding.        Temp:  [97.7 °F (36.5 °C)-98.1 °F (36.7 °C)] 97.9 °F (36.6 °C)  Heart Rate:   [] 92  Resp:  [18] 18  BP: (124-135)/(54-61) 124/56  Body mass index is 39.97 kg/m².    Physical Exam  General, awake and alert.  Head and ENT, normocephalic and atraumatic.  Lungs, symmetric expansion, equal air entry bilaterally.  Heart, regular rate and rhythm.  Abdomen, soft and nontender.  Extremities, no clubbing or cyanosis.  Neuro, no focal deficits.  Skin: Warm and no rash.  Psych, normal mood and affect.  Musculoskeletal, joint examination is grossly normal.      Disposition: Home or Self Care       Discharge Medications        Continue These Medications        Instructions Start Date   cholecalciferol 25 MCG (1000 UT) tablet  Commonly known as: VITAMIN D3   2,000 Units, Oral, Daily      folic acid 800 MCG tablet  Commonly known as: FOLVITE   800 mcg, Oral, Daily      furosemide 40 MG tablet  Commonly known as: LASIX   80 mg, Daily      lisinopril 40 MG tablet  Commonly known as: PRINIVIL,ZESTRIL   TAKE ONE TABLET BY MOUTH DAILY      loratadine-pseudoephedrine  MG per 24 hr tablet  Commonly known as: Claritin-D 24 Hour   1 tablet, Oral, Daily      pantoprazole 40 MG EC tablet  Commonly known as: PROTONIX   40 mg, Oral, Every Early Morning      vitamin B-12 1000 MCG tablet  Commonly known as: CYANOCOBALAMIN   1,000 mcg, Oral, Daily      vitamin E 400 UNIT capsule   400 Units, Oral, Daily                Additional Instructions for the Follow-ups that You Need to Schedule       Discharge Follow-up with PCP   As directed       Currently Documented PCP:    Johanny Hall MD    PCP Phone Number:    [phone]     Follow Up Details: Follow-up with PCP in 7 days.               Follow-up Information       Johanny Hall MD .    Specialty: Internal Medicine  Why: Follow-up with PCP in 7 days.  Contact information:  [address] [facility]  [address] [place] [address]  [phone]                            Future Appointments   Date Time Provider Department Center   4/7/2025 10:30 AM [place]  Fardia WIN MD [facility]     Pending Labs       Order Current Status    AFB Culture - Body Fluid, Pleural Cavity Preliminary result    Body Fluid Culture - Body Fluid, Pleural Cavity Preliminary result           Hernán Daugherty MD  [facility]  03/20/25 14:55 EDT    Discharge time spent greater than 30 minutes.

## 2025-03-21 ENCOUNTER — TRANSITIONAL CARE MANAGEMENT TELEPHONE ENCOUNTER (OUTPATIENT)
Dept: CALL CENTER | Facility: HOSPITAL | Age: 70
End: 2025-03-21
Payer: MEDICARE

## 2025-03-21 LAB
BACTERIA FLD CULT: NORMAL
GRAM STN SPEC: NORMAL

## 2025-03-21 NOTE — OUTREACH NOTE
Prep Survey      Flowsheet Row Responses   [facility] facility patient discharged from? [place]   Is LACE score < 7 ? No   Eligibility [facility]   Date of Admission 03/14/25   Date of Discharge 03/20/25   Discharge Disposition Home or Self Care   Discharge diagnosis Pneumonia   Does the patient have one of the following disease processes/diagnoses(primary or secondary)? Pneumonia   Does the patient have Home health ordered? No   Is there a DME ordered? No   Comments regarding appointments New PCP appt   Prep survey completed? Yes            AARON LANG - Registered Nurse

## 2025-03-21 NOTE — OUTREACH NOTE
Call Center TCM Note      Flowsheet Row Responses   [facility] patient discharged from? [facility]   Does the patient have one of the following disease processes/diagnoses(primary or secondary)? Pneumonia   TCM attempt successful? Yes   Call start time 1000   Call end time 1008   List who call center can speak with pt   Medication alerts for this patient No changes to medication list.   Comments Hospital f/u appointment scheduled for 4- @ 10:30 am.   Does the patient have an appointment with their PCP within 7-14 days of discharge? Yes   Has home health visited the patient within 72 hours of discharge? N/A   DME comments Pt not requiring 02 at this time.   Pulse Ox monitoring Intermittent   Pulse Ox device source Patient   Psychosocial issues? No   Did the patient receive a copy of their discharge instructions? Yes   Nursing interventions Reviewed instructions with patient   What is the patient's perception of their health status since discharge? Improving   Is the patient/caregiver able to teach back the hierarchy of who to call/visit for symptoms/problems? PCP, Specialist, Home health nurse, Urgent Care, ED, 911 Yes   Is the patient/caregiver able to teach back signs and symptoms of worsening condition: Fever/chills, Shortness of breath, Chest pain   Is the patient/caregiver able to teach back importance of completing antibiotic course of treatment? Yes   TCM call completed? Yes   Wrap up additional comments Pt reports improving slowly. Pt is checking bp and 02 sats often. Pt is not requiring 02 at this time. No cahnges to medications list.   Call end time 1008   Would this patient benefit from a Referral to Amb Social Work? No   Is the patient interested in additional calls from an ambulatory ? No            Lorena Tracy RN    3/21/2025, 10:10 EDT

## 2025-03-23 LAB
MYCOBACTERIUM SPEC CULT: NORMAL
NIGHT BLUE STAIN TISS: NORMAL

## 2025-03-30 LAB
MYCOBACTERIUM SPEC CULT: NORMAL
NIGHT BLUE STAIN TISS: NORMAL

## 2025-04-06 LAB
MYCOBACTERIUM SPEC CULT: NORMAL
NIGHT BLUE STAIN TISS: NORMAL

## 2025-04-07 ENCOUNTER — OFFICE VISIT (OUTPATIENT)
Dept: INTERNAL MEDICINE | Facility: CLINIC | Age: 70
End: 2025-04-07
Payer: MEDICARE

## 2025-04-07 VITALS
OXYGEN SATURATION: 96 % | HEART RATE: 77 BPM | BODY MASS INDEX: 38.57 KG/M2 | SYSTOLIC BLOOD PRESSURE: 118 MMHG | HEIGHT: 66 IN | DIASTOLIC BLOOD PRESSURE: 82 MMHG | RESPIRATION RATE: 18 BRPM | WEIGHT: 240 LBS

## 2025-04-07 DIAGNOSIS — Z12.31 ENCOUNTER FOR SCREENING MAMMOGRAM FOR BREAST CANCER: ICD-10-CM

## 2025-04-07 DIAGNOSIS — E66.9 OBESITY (BMI 30-39.9): Chronic | ICD-10-CM

## 2025-04-07 DIAGNOSIS — K74.69 OTHER CIRRHOSIS OF LIVER: Chronic | ICD-10-CM

## 2025-04-07 DIAGNOSIS — R73.03 PREDIABETES: Chronic | ICD-10-CM

## 2025-04-07 DIAGNOSIS — E78.5 HYPERLIPIDEMIA, UNSPECIFIED HYPERLIPIDEMIA TYPE: Chronic | ICD-10-CM

## 2025-04-07 DIAGNOSIS — Z12.11 SCREENING FOR COLON CANCER: ICD-10-CM

## 2025-04-07 DIAGNOSIS — K21.9 GASTROESOPHAGEAL REFLUX DISEASE, UNSPECIFIED WHETHER ESOPHAGITIS PRESENT: Chronic | ICD-10-CM

## 2025-04-07 DIAGNOSIS — Z76.89 ENCOUNTER TO ESTABLISH CARE WITH NEW DOCTOR: Primary | ICD-10-CM

## 2025-04-07 DIAGNOSIS — I10 BENIGN ESSENTIAL HYPERTENSION: Chronic | ICD-10-CM

## 2025-04-07 DIAGNOSIS — Z12.4 SCREENING FOR CERVICAL CANCER: ICD-10-CM

## 2025-04-07 PROBLEM — E87.6 HYPOKALEMIA: Status: RESOLVED | Noted: 2025-02-24 | Resolved: 2025-04-07

## 2025-04-07 PROBLEM — D72.829 LEUKOCYTOSIS: Status: RESOLVED | Noted: 2025-02-24 | Resolved: 2025-04-07

## 2025-04-07 PROBLEM — R73.01 IMPAIRED FASTING GLUCOSE: Status: RESOLVED | Noted: 2017-12-22 | Resolved: 2025-04-07

## 2025-04-07 PROBLEM — G43.109 MIGRAINE HEADACHE WITH AURA: Chronic | Status: ACTIVE | Noted: 2020-01-21

## 2025-04-07 PROBLEM — E87.1 HYPONATREMIA: Status: RESOLVED | Noted: 2025-02-24 | Resolved: 2025-04-07

## 2025-04-07 PROBLEM — R07.9 CHEST PAIN: Status: RESOLVED | Noted: 2025-02-24 | Resolved: 2025-04-07

## 2025-04-07 PROBLEM — R93.5 ABNORMAL CT OF THE ABDOMEN: Status: RESOLVED | Noted: 2025-02-24 | Resolved: 2025-04-07

## 2025-04-07 PROBLEM — R76.8 ANA POSITIVE: Status: ACTIVE | Noted: 2024-12-20

## 2025-04-07 PROBLEM — R74.01 TRANSAMINITIS: Status: RESOLVED | Noted: 2025-02-24 | Resolved: 2025-04-07

## 2025-04-07 PROBLEM — J18.9 PNEUMONIA INVOLVING RIGHT LUNG: Status: RESOLVED | Noted: 2025-03-14 | Resolved: 2025-04-07

## 2025-04-07 LAB
ALBUMIN SERPL-MCNC: 3 G/DL (ref 3.5–5.2)
ALBUMIN/GLOB SERPL: 0.8 G/DL
ALP SERPL-CCNC: 250 U/L (ref 39–117)
ALT SERPL-CCNC: 41 U/L (ref 1–33)
AST SERPL-CCNC: 81 U/L (ref 1–32)
BASOPHILS # BLD AUTO: 0.04 10*3/MM3 (ref 0–0.2)
BASOPHILS NFR BLD AUTO: 0.5 % (ref 0–1.5)
BILIRUB SERPL-MCNC: 6.3 MG/DL (ref 0–1.2)
BUN SERPL-MCNC: 12 MG/DL (ref 8–23)
BUN/CREAT SERPL: 11.5 (ref 7–25)
CALCIUM SERPL-MCNC: 8.8 MG/DL (ref 8.6–10.5)
CHLORIDE SERPL-SCNC: 97 MMOL/L (ref 98–107)
CHOLEST SERPL-MCNC: 167 MG/DL (ref 0–200)
CO2 SERPL-SCNC: 33.6 MMOL/L (ref 22–29)
CREAT SERPL-MCNC: 1.04 MG/DL (ref 0.57–1)
EGFRCR SERPLBLD CKD-EPI 2021: 58.7 ML/MIN/1.73
EOSINOPHIL # BLD AUTO: 0.18 10*3/MM3 (ref 0–0.4)
EOSINOPHIL NFR BLD AUTO: 2.2 % (ref 0.3–6.2)
ERYTHROCYTE [DISTWIDTH] IN BLOOD BY AUTOMATED COUNT: 13.3 % (ref 12.3–15.4)
GLOBULIN SER CALC-MCNC: 3.8 GM/DL
GLUCOSE SERPL-MCNC: 93 MG/DL (ref 65–99)
HBA1C MFR BLD: 4.3 % (ref 4.8–5.6)
HCT VFR BLD AUTO: 37 % (ref 34–46.6)
HDLC SERPL-MCNC: 14 MG/DL (ref 40–60)
HGB BLD-MCNC: 13.5 G/DL (ref 12–15.9)
IMM GRANULOCYTES # BLD AUTO: 0.02 10*3/MM3 (ref 0–0.05)
IMM GRANULOCYTES NFR BLD AUTO: 0.2 % (ref 0–0.5)
LDLC SERPL CALC-MCNC: 131 MG/DL (ref 0–100)
LYMPHOCYTES # BLD AUTO: 1.37 10*3/MM3 (ref 0.7–3.1)
LYMPHOCYTES NFR BLD AUTO: 17.1 % (ref 19.6–45.3)
MCH RBC QN AUTO: 34.1 PG (ref 26.6–33)
MCHC RBC AUTO-ENTMCNC: 36.5 G/DL (ref 31.5–35.7)
MCV RBC AUTO: 93.4 FL (ref 79–97)
MONOCYTES # BLD AUTO: 0.89 10*3/MM3 (ref 0.1–0.9)
MONOCYTES NFR BLD AUTO: 11.1 % (ref 5–12)
NEUTROPHILS # BLD AUTO: 5.51 10*3/MM3 (ref 1.7–7)
NEUTROPHILS NFR BLD AUTO: 68.9 % (ref 42.7–76)
PLATELET # BLD AUTO: 132 10*3/MM3 (ref 140–450)
POTASSIUM SERPL-SCNC: 3.7 MMOL/L (ref 3.5–5.2)
PROT SERPL-MCNC: 6.8 G/DL (ref 6–8.5)
RBC # BLD AUTO: 3.96 10*6/MM3 (ref 3.77–5.28)
SODIUM SERPL-SCNC: 139 MMOL/L (ref 136–145)
TRIGL SERPL-MCNC: 117 MG/DL (ref 0–150)
VLDLC SERPL CALC-MCNC: 22 MG/DL (ref 5–40)
WBC # BLD AUTO: 8.01 10*3/MM3 (ref 3.4–10.8)

## 2025-04-07 RX ORDER — LISINOPRIL 20 MG/1
20 TABLET ORAL DAILY
COMMUNITY

## 2025-04-07 RX ORDER — PANTOPRAZOLE SODIUM 40 MG/1
40 TABLET, DELAYED RELEASE ORAL
Qty: 30 TABLET | Refills: 0 | Status: SHIPPED | OUTPATIENT
Start: 2025-04-07

## 2025-04-07 RX ORDER — CHOLECALCIFEROL (VITAMIN D3) 25 MCG
1000 TABLET ORAL DAILY
COMMUNITY

## 2025-04-07 NOTE — PATIENT INSTRUCTIONS
AM 5mg metoprolol push held due to BP of 118/59 and HR of 58. RADHA Velásquez NP notified.    I recommend obtaining Prevnar 20 (pneumococcal), Shingles (Shingrix), hepatitis B, and COVID-19 vaccinations from your local pharmacy.

## 2025-04-07 NOTE — ASSESSMENT & PLAN NOTE
Referral to [facility] provider placed per patient request. Advised her to go ahead and follow up with scheduled [facility] provider in the meantime. Would prefer to not delay any further procedures or investigation of cirrhosis.

## 2025-04-07 NOTE — PROGRESS NOTES
Chief Complaint  Establish care, cirrhosis, HTN, HLD, prediabetes, epigastric pain/GERD    Subjective        Lacey Ramirez presents to clinic today to establish care as a new patient. Was previously following with a [place] provider.     Patient has cirrhosis diagnosed in 2024. 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 She was treated with thoracentesis and IV antibiotics at that time. Discharged with outpatient GI follow up. Today patient reports that she feels much better compared to that hospital stay. She has GI scopes scheduled with a [place] GI specialist on Monday 4/14/25. She requests referral to a GI specialist here in the [facility] system, as she would like to keep all providers within [facility]. Will place referral order at this time. States that she is having bowel movements about once a day, but that they are not always regular. Advised over the counter stool softeners daily with goal to titrate BMs to at least twice daily.    Has been having epigastric pain recently. Was prescribed PPI daily for this during her hospital stay, but unsure if it was giving her much benefit. She has since run out of the medication. I let her know that I will refill it at this time and recommend that she continue to take it daily while awaiting GI evaluation.     Patient has a hypertension for which she takes lisinopril 20mg daily and lasix 40mg daily. Reports compliance with this regimen, states that home pressures are well controlled.     Reports a history of high cholesterol, does not take atorvastatin as she states that this spiked her BG in the past. She is fasting today, will obtain lipid panel and make recommendations from there. States that she was taking Zetia daily previously, but not at this time.     Has not had a gynecologic evaluation in several years. Does not remember when her last pap or mammo was. Will place  "mammo order and gyn referral at this time.   History of Present Illness      Objective   Vital Signs:  /82 (BP Location: Left arm, Patient Position: Sitting, Cuff Size: Adult)   Pulse 77   Resp 18   Ht 167.6 cm (66\")   Wt 109 kg (240 lb)   SpO2 96%   BMI 38.74 kg/m²   Estimated body mass index is 38.74 kg/m² as calculated from the following:    Height as of this encounter: 167.6 cm (66\").    Weight as of this encounter: 109 kg (240 lb).        Physical Exam  Constitutional:       General: She is not in acute distress.     Appearance: Normal appearance.   Eyes:      General: Scleral icterus present.      Extraocular Movements: Extraocular movements intact.      Conjunctiva/sclera: Conjunctivae normal.   Cardiovascular:      Rate and Rhythm: Normal rate and regular rhythm.      Heart sounds: No murmur heard.  Pulmonary:      Effort: Pulmonary effort is normal.      Breath sounds: No wheezing.      Comments: Breath sounds are decreased over lower right lung fields.   Abdominal:      General: Abdomen is flat.      Palpations: Abdomen is soft.      Comments: Patient reports tenderness to palpation over epigastric region.   Musculoskeletal:         General: No swelling or deformity. Normal range of motion.   Skin:     General: Skin is warm and dry.   Neurological:      General: No focal deficit present.      Mental Status: She is oriented to person, place, and time. Mental status is at baseline.   Psychiatric:         Mood and Affect: Mood normal.         Behavior: Behavior normal.     Result Review :  The following data was reviewed by: Farida Mcnulty MD on 04/07/2025:  CMP          3/18/2025    04:00 3/19/2025    04:17 3/20/2025    04:19   CMP   Glucose 99  89  84    BUN 11  10  11    Creatinine 0.63  0.70  0.58    EGFR 96.8  94.3  98.7    Sodium 137  138  135    Potassium 4.3  4.2  4.2    Chloride 104  104  104    Calcium 8.3  8.3  8.3    Total Protein 5.6  5.8  5.7    Albumin 3.1  3.1  2.9    Globulin " 2.5  2.7  2.8    Total Bilirubin 5.0  4.4  4.3    Alkaline Phosphatase 151  156  164    AST (SGOT) 56  61  72    ALT (SGPT) 24  29  31    Albumin/Globulin Ratio 1.2  1.1  1.0    BUN/Creatinine Ratio 17.5  14.3  19.0    Anion Gap 8.0  4.5  5.0      CBC          3/18/2025    04:00 3/19/2025    04:17 3/20/2025    04:19   CBC   WBC 6.45  6.91  7.26    RBC 3.43  3.43  3.52    Hemoglobin 11.7  11.8  11.9    Hematocrit 32.2  32.2  33.2    MCV 93.9  93.9  94.3    MCH 34.1  34.4  33.8    MCHC 36.3  36.6  35.8    RDW 13.7  13.6  13.9    Platelets 98  101  103      Data reviewed : Hospital discharge note 3/20/2025      US RENAL BILATERAL 3/14/25     Clinical: Acute renal insufficiency     FINDINGS: The right kidney is 8.4 cm in length and the left kidney is  11.5 cm in length. The right kidney was less than optimally demonstrated  due to the patient's body habitus, lower pole cannot be seen. No  obstructive uropathy on the right or left. The left kidney is  satisfactory in appearance. No calculus. The renal cortical echotexture  is within normal limits. The bladder was collapsed cannot be evaluated.  The remainder is unremarkable.        Current Outpatient Medications:     Cholecalciferol 25 MCG (1000 UT) tablet, Take 1 tablet by mouth Daily., Disp: , Rfl:     folic acid (FOLVITE) 800 MCG tablet, Take 1 tablet by mouth Daily., Disp: 30 tablet, Rfl: 6    furosemide (LASIX) 40 MG tablet, Take 2 tablets by mouth Daily., Disp: , Rfl:     lisinopril (PRINIVIL,ZESTRIL) 20 MG tablet, Take 1 tablet by mouth Daily., Disp: , Rfl:     pantoprazole (PROTONIX) 40 MG EC tablet, Take 1 tablet by mouth Every Morning., Disp: 30 tablet, Rfl: 0    vitamin B-12 (CYANOCOBALAMIN) 1000 MCG tablet, Take 1 tablet by mouth Daily., Disp: , Rfl:     vitamin E 400 UNIT capsule, Take 1 capsule by mouth Daily., Disp: , Rfl:       Assessment and Plan   Diagnoses and all orders for this visit:    1. Encounter to establish care with new doctor (Primary)    2.  Benign essential hypertension  Assessment & Plan:  Hypertension is stable and controlled  Continue current treatment regimen.  Ambulatory blood pressure monitoring. BP log provided in clinic today.   Blood pressure will be reassessed in 6 months.    Orders:  -     Comprehensive metabolic panel    3. Hyperlipidemia, unspecified hyperlipidemia type  -     Lipid panel    4. Prediabetes  -     Hemoglobin A1c    5. Obesity (BMI 30-39.9)  Assessment & Plan:  Recommend that patient continue life style modifications with balanced diet and regular exercise.      6. Other cirrhosis of liver  Assessment & Plan:  Referral to [facility] provider placed per patient request. Advised her to go ahead and follow up with scheduled [place] [facility] provider in the meantime. Would prefer to not delay any further procedures or investigation of cirrhosis.     Orders:  -     Ambulatory Referral to Gastroenterology  -     Lipid panel  -     Comprehensive metabolic panel  -     CBC w AUTO Differential    7. Gastroesophageal reflux disease, unspecified whether esophagitis present  -     Ambulatory Referral to Gastroenterology  -     pantoprazole (PROTONIX) 40 MG EC tablet; Take 1 tablet by mouth Every Morning.  Dispense: 30 tablet; Refill: 0    8. Screening for colon cancer  -     Ambulatory Referral to Gastroenterology    9. Encounter for screening mammogram for breast cancer  -     Mammo Screening Digital Tomosynthesis Bilateral With CAD; Future    10. Screening for cervical cancer  -     Mammo Screening Digital Tomosynthesis Bilateral With CAD; Future  -     Ambulatory Referral to Gynecology             Follow Up   Return in about 6 months (around 10/7/2025) for Annual Medicare Wellness, lab work.    Patient was given instructions and counseling regarding her condition or for health maintenance advice. Please see specific information pulled into the AVS if appropriate.     Farida Mcnulty MD    Patient or patient representative verbalized  consent for the use of Ambient Listening during the visit with  Farida Mcnulty MD for chart documentation. 4/7/2025  10:55 EDT

## 2025-04-07 NOTE — ASSESSMENT & PLAN NOTE
Hypertension is stable and controlled  Continue current treatment regimen.  Ambulatory blood pressure monitoring. BP log provided in clinic today.   Blood pressure will be reassessed in 6 months.

## 2025-04-13 LAB
MYCOBACTERIUM SPEC CULT: NORMAL
NIGHT BLUE STAIN TISS: NORMAL

## 2025-04-20 LAB
MYCOBACTERIUM SPEC CULT: NORMAL
NIGHT BLUE STAIN TISS: NORMAL

## 2025-04-27 LAB
MYCOBACTERIUM SPEC CULT: NORMAL
NIGHT BLUE STAIN TISS: NORMAL

## 2025-04-28 DIAGNOSIS — K21.9 GASTROESOPHAGEAL REFLUX DISEASE, UNSPECIFIED WHETHER ESOPHAGITIS PRESENT: Chronic | ICD-10-CM

## 2025-04-28 RX ORDER — PANTOPRAZOLE SODIUM 40 MG/1
40 TABLET, DELAYED RELEASE ORAL
Qty: 30 TABLET | Refills: 0 | Status: SHIPPED | OUTPATIENT
Start: 2025-04-28

## 2025-04-30 RX ORDER — LISINOPRIL 20 MG/1
20 TABLET ORAL DAILY
Qty: 90 TABLET | Refills: 1 | Status: SHIPPED | OUTPATIENT
Start: 2025-04-30

## 2025-04-30 RX ORDER — FUROSEMIDE 40 MG/1
80 TABLET ORAL DAILY
Qty: 60 TABLET | Refills: 3 | Status: SHIPPED | OUTPATIENT
Start: 2025-04-30 | End: 2025-08-02

## 2025-05-12 NOTE — TELEPHONE ENCOUNTER
Caller: Lacey Ramirez    Relationship: Self    Best call back number: [phone]     Requested Prescriptions:   Requested Prescriptions     Pending Prescriptions Disp Refills    pantoprazole (PROTONIX) 40 MG EC tablet 30 tablet 0     Sig: Take 1 tablet by mouth Every Morning.        Pharmacy where request should be sent: [facility] [id number] [facility] [address] [phone] [phone]      Last office visit with prescribing clinician: 4/7/2025   Last telemedicine visit with prescribing clinician: Visit date not found   Next office visit with prescribing clinician: 10/14/2025     Additional details provided by patient: OUT OF MEDICATION    Does the patient have less than a 3 day supply:  [x] Yes  [] No    Would you like a call back once the refill request has been completed: [] Yes [] No    If the office needs to give you a call back, can they leave a voicemail: [] Yes [] No    Jeremias Mccormack Rep   05/12/25 08:54 EDT